# Patient Record
Sex: FEMALE | Race: BLACK OR AFRICAN AMERICAN | Employment: OTHER | URBAN - METROPOLITAN AREA
[De-identification: names, ages, dates, MRNs, and addresses within clinical notes are randomized per-mention and may not be internally consistent; named-entity substitution may affect disease eponyms.]

---

## 2017-02-07 RX ORDER — AMLODIPINE BESYLATE 5 MG/1
TABLET ORAL
Qty: 90 TABLET | Refills: 5 | Status: ON HOLD | OUTPATIENT
Start: 2017-02-07 | End: 2017-06-11 | Stop reason: HOSPADM

## 2017-05-09 ENCOUNTER — OFFICE VISIT (OUTPATIENT)
Dept: FAMILY MEDICINE CLINIC | Age: 77
End: 2017-05-09
Payer: COMMERCIAL

## 2017-05-09 VITALS — TEMPERATURE: 97.7 F | HEART RATE: 93 BPM | SYSTOLIC BLOOD PRESSURE: 95 MMHG | DIASTOLIC BLOOD PRESSURE: 62 MMHG

## 2017-05-09 DIAGNOSIS — I10 ESSENTIAL HYPERTENSION: Primary | ICD-10-CM

## 2017-05-09 DIAGNOSIS — R06.7 SNEEZING WITH WATERY EYES: ICD-10-CM

## 2017-05-09 DIAGNOSIS — H02.402 PTOSIS, LEFT: ICD-10-CM

## 2017-05-09 DIAGNOSIS — M17.0 PRIMARY OSTEOARTHRITIS OF BOTH KNEES: ICD-10-CM

## 2017-05-09 DIAGNOSIS — H04.209 SNEEZING WITH WATERY EYES: ICD-10-CM

## 2017-05-09 PROCEDURE — 99213 OFFICE O/P EST LOW 20 MIN: CPT | Performed by: FAMILY MEDICINE

## 2017-05-09 RX ORDER — LISINOPRIL 20 MG/1
TABLET ORAL
Qty: 30 TABLET | Refills: 1 | Status: ON HOLD | OUTPATIENT
Start: 2017-05-09 | End: 2017-06-11 | Stop reason: HOSPADM

## 2017-05-09 ASSESSMENT — ENCOUNTER SYMPTOMS
SHORTNESS OF BREATH: 0
ABDOMINAL DISTENTION: 0
PHOTOPHOBIA: 0
EYE REDNESS: 0
EYE PAIN: 0
DIARRHEA: 0
ABDOMINAL PAIN: 0
EYE DISCHARGE: 1
CONSTIPATION: 0
EYE ITCHING: 0
BLOOD IN STOOL: 0

## 2017-05-09 ASSESSMENT — PATIENT HEALTH QUESTIONNAIRE - PHQ9
SUM OF ALL RESPONSES TO PHQ QUESTIONS 1-9: 0
SUM OF ALL RESPONSES TO PHQ9 QUESTIONS 1 & 2: 0
2. FEELING DOWN, DEPRESSED OR HOPELESS: 0
1. LITTLE INTEREST OR PLEASURE IN DOING THINGS: 0

## 2017-05-18 ENCOUNTER — APPOINTMENT (OUTPATIENT)
Dept: GENERAL RADIOLOGY | Age: 77
End: 2017-05-18
Payer: COMMERCIAL

## 2017-05-18 ENCOUNTER — APPOINTMENT (OUTPATIENT)
Dept: CT IMAGING | Age: 77
End: 2017-05-18
Payer: COMMERCIAL

## 2017-05-18 ENCOUNTER — HOSPITAL ENCOUNTER (EMERGENCY)
Age: 77
Discharge: HOME | End: 2017-05-18
Attending: EMERGENCY MEDICINE
Payer: COMMERCIAL

## 2017-05-18 VITALS
DIASTOLIC BLOOD PRESSURE: 65 MMHG | RESPIRATION RATE: 22 BRPM | SYSTOLIC BLOOD PRESSURE: 108 MMHG | TEMPERATURE: 98.2 F | HEIGHT: 65 IN | HEART RATE: 98 BPM | OXYGEN SATURATION: 97 %

## 2017-05-18 DIAGNOSIS — N30.00 ACUTE CYSTITIS WITHOUT HEMATURIA: ICD-10-CM

## 2017-05-18 DIAGNOSIS — E87.1 HYPONATREMIA: ICD-10-CM

## 2017-05-18 DIAGNOSIS — R29.898 UPPER EXTREMITY WEAKNESS: Primary | ICD-10-CM

## 2017-05-18 LAB
-: ABNORMAL
ABSOLUTE EOS #: 0 K/UL (ref 0–0.4)
ABSOLUTE LYMPH #: 1.85 K/UL (ref 1–4.8)
ABSOLUTE MONO #: 0.09 K/UL (ref 0.1–0.8)
ALBUMIN SERPL-MCNC: 3.8 G/DL (ref 3.5–5.2)
ALBUMIN/GLOBULIN RATIO: 1.1 (ref 1–2.5)
ALP BLD-CCNC: 67 U/L (ref 35–104)
ALT SERPL-CCNC: 7 U/L (ref 5–33)
AMORPHOUS: ABNORMAL
ANION GAP SERPL CALCULATED.3IONS-SCNC: 10 MMOL/L (ref 9–17)
AST SERPL-CCNC: 17 U/L
BACTERIA: ABNORMAL
BASOPHILS # BLD: 0 %
BASOPHILS ABSOLUTE: 0 K/UL (ref 0–0.2)
BILIRUB SERPL-MCNC: 0.23 MG/DL (ref 0.3–1.2)
BILIRUBIN URINE: NEGATIVE
BUN BLDV-MCNC: 10 MG/DL (ref 8–23)
BUN/CREAT BLD: ABNORMAL (ref 9–20)
CALCIUM SERPL-MCNC: 9.5 MG/DL (ref 8.6–10.4)
CASTS UA: ABNORMAL /LPF (ref 0–2)
CHLORIDE BLD-SCNC: 94 MMOL/L (ref 98–107)
CO2: 25 MMOL/L (ref 20–31)
COLOR: YELLOW
COMMENT UA: ABNORMAL
CREAT SERPL-MCNC: 0.36 MG/DL (ref 0.5–0.9)
CRYSTALS, UA: ABNORMAL /HPF
DIFFERENTIAL TYPE: ABNORMAL
EOSINOPHILS RELATIVE PERCENT: 0 %
EPITHELIAL CELLS UA: ABNORMAL /HPF (ref 0–5)
GFR AFRICAN AMERICAN: >60 ML/MIN
GFR NON-AFRICAN AMERICAN: >60 ML/MIN
GFR SERPL CREATININE-BSD FRML MDRD: ABNORMAL ML/MIN/{1.73_M2}
GFR SERPL CREATININE-BSD FRML MDRD: ABNORMAL ML/MIN/{1.73_M2}
GLUCOSE BLD-MCNC: 122 MG/DL (ref 70–99)
GLUCOSE URINE: NEGATIVE
HCT VFR BLD CALC: 33 % (ref 36–46)
HEMOGLOBIN: 10.2 G/DL (ref 12–16)
KETONES, URINE: NEGATIVE
LEUKOCYTE ESTERASE, URINE: ABNORMAL
LYMPHOCYTES # BLD: 43 %
MCH RBC QN AUTO: 22.3 PG (ref 26–34)
MCHC RBC AUTO-ENTMCNC: 30.9 G/DL (ref 31–37)
MCV RBC AUTO: 72.3 FL (ref 80–100)
MONOCYTES # BLD: 2 %
MORPHOLOGY: ABNORMAL
MUCUS: ABNORMAL
NITRITE, URINE: NEGATIVE
OTHER OBSERVATIONS UA: ABNORMAL
PDW BLD-RTO: 24.8 % (ref 12.5–15.4)
PH UA: 8 (ref 5–8)
PLATELET # BLD: 230 K/UL (ref 140–450)
PLATELET ESTIMATE: ABNORMAL
PMV BLD AUTO: 10.1 FL (ref 6–12)
POC TROPONIN I: 0 NG/ML (ref 0–0.1)
POC TROPONIN I: 0 NG/ML (ref 0–0.1)
POC TROPONIN INTERP: NORMAL
POC TROPONIN INTERP: NORMAL
POTASSIUM SERPL-SCNC: 4.2 MMOL/L (ref 3.7–5.3)
PROTEIN UA: NEGATIVE
RBC # BLD: 4.57 M/UL (ref 4–5.2)
RBC # BLD: ABNORMAL 10*6/UL
RBC UA: ABNORMAL /HPF (ref 0–2)
RENAL EPITHELIAL, UA: ABNORMAL /HPF
SEG NEUTROPHILS: 55 %
SEGMENTED NEUTROPHILS ABSOLUTE COUNT: 2.36 K/UL (ref 1.8–7.7)
SODIUM BLD-SCNC: 129 MMOL/L (ref 135–144)
SPECIFIC GRAVITY UA: 1 (ref 1–1.03)
TOTAL PROTEIN: 7.4 G/DL (ref 6.4–8.3)
TRICHOMONAS: ABNORMAL
TURBIDITY: ABNORMAL
URINE HGB: ABNORMAL
UROBILINOGEN, URINE: NORMAL
WBC # BLD: 4.3 K/UL (ref 3.5–11)
WBC # BLD: ABNORMAL 10*3/UL
WBC UA: ABNORMAL /HPF (ref 0–5)
YEAST: ABNORMAL

## 2017-05-18 PROCEDURE — 2580000003 HC RX 258: Performed by: EMERGENCY MEDICINE

## 2017-05-18 PROCEDURE — 71010 XR CHEST PORTABLE: CPT

## 2017-05-18 PROCEDURE — 87086 URINE CULTURE/COLONY COUNT: CPT

## 2017-05-18 PROCEDURE — 84484 ASSAY OF TROPONIN QUANT: CPT

## 2017-05-18 PROCEDURE — 6370000000 HC RX 637 (ALT 250 FOR IP): Performed by: EMERGENCY MEDICINE

## 2017-05-18 PROCEDURE — 70450 CT HEAD/BRAIN W/O DYE: CPT

## 2017-05-18 PROCEDURE — 81001 URINALYSIS AUTO W/SCOPE: CPT

## 2017-05-18 PROCEDURE — 85025 COMPLETE CBC W/AUTO DIFF WBC: CPT

## 2017-05-18 PROCEDURE — 80053 COMPREHEN METABOLIC PANEL: CPT

## 2017-05-18 PROCEDURE — 93005 ELECTROCARDIOGRAM TRACING: CPT

## 2017-05-18 PROCEDURE — 99285 EMERGENCY DEPT VISIT HI MDM: CPT

## 2017-05-18 PROCEDURE — 72125 CT NECK SPINE W/O DYE: CPT

## 2017-05-18 RX ORDER — CEPHALEXIN 500 MG/1
500 CAPSULE ORAL ONCE
Status: COMPLETED | OUTPATIENT
Start: 2017-05-18 | End: 2017-05-18

## 2017-05-18 RX ORDER — CEPHALEXIN 500 MG/1
500 CAPSULE ORAL 2 TIMES DAILY
Qty: 14 CAPSULE | Refills: 0 | Status: SHIPPED | OUTPATIENT
Start: 2017-05-18 | End: 2017-05-25

## 2017-05-18 RX ORDER — 0.9 % SODIUM CHLORIDE 0.9 %
500 INTRAVENOUS SOLUTION INTRAVENOUS ONCE
Status: COMPLETED | OUTPATIENT
Start: 2017-05-18 | End: 2017-05-18

## 2017-05-18 RX ADMIN — SODIUM CHLORIDE 500 ML: 9 INJECTION, SOLUTION INTRAVENOUS at 18:56

## 2017-05-18 RX ADMIN — CEPHALEXIN 500 MG: 500 CAPSULE ORAL at 20:55

## 2017-05-19 LAB
CULTURE: NORMAL
CULTURE: NORMAL
Lab: NORMAL
SPECIMEN DESCRIPTION: NORMAL
STATUS: NORMAL

## 2017-05-22 LAB
EKG ATRIAL RATE: 92 BPM
EKG P AXIS: 59 DEGREES
EKG P-R INTERVAL: 154 MS
EKG Q-T INTERVAL: 322 MS
EKG QRS DURATION: 74 MS
EKG QTC CALCULATION (BAZETT): 398 MS
EKG R AXIS: 2 DEGREES
EKG T AXIS: 23 DEGREES
EKG VENTRICULAR RATE: 92 BPM

## 2017-06-01 ENCOUNTER — APPOINTMENT (OUTPATIENT)
Dept: MRI IMAGING | Age: 77
DRG: 057 | End: 2017-06-01
Payer: COMMERCIAL

## 2017-06-01 ENCOUNTER — HOSPITAL ENCOUNTER (INPATIENT)
Age: 77
LOS: 10 days | Discharge: HOME | DRG: 057 | End: 2017-06-11
Attending: EMERGENCY MEDICINE | Admitting: FAMILY MEDICINE
Payer: COMMERCIAL

## 2017-06-01 ENCOUNTER — APPOINTMENT (OUTPATIENT)
Dept: GENERAL RADIOLOGY | Age: 77
DRG: 057 | End: 2017-06-01
Payer: COMMERCIAL

## 2017-06-01 DIAGNOSIS — R13.19 OTHER DYSPHAGIA: ICD-10-CM

## 2017-06-01 DIAGNOSIS — H02.403 PTOSIS, BILATERAL: Primary | ICD-10-CM

## 2017-06-01 DIAGNOSIS — H57.03: ICD-10-CM

## 2017-06-01 DIAGNOSIS — E16.2 HYPOGLYCEMIA: ICD-10-CM

## 2017-06-01 PROBLEM — Z74.09 IMMOBILITY: Status: ACTIVE | Noted: 2017-06-01

## 2017-06-01 PROBLEM — R13.10 DYSPHAGIA: Status: ACTIVE | Noted: 2017-06-01

## 2017-06-01 PROBLEM — R47.1 DYSARTHRIA: Status: ACTIVE | Noted: 2017-06-01

## 2017-06-01 PROBLEM — J44.9 COPD (CHRONIC OBSTRUCTIVE PULMONARY DISEASE) (HCC): Status: ACTIVE | Noted: 2017-06-01

## 2017-06-01 PROBLEM — H02.409 PTOSIS: Status: ACTIVE | Noted: 2017-06-01

## 2017-06-01 LAB
ABSOLUTE EOS #: 0.05 K/UL (ref 0–0.4)
ABSOLUTE LYMPH #: 2.11 K/UL (ref 1–4.8)
ABSOLUTE MONO #: 0.34 K/UL (ref 0.1–0.8)
ANION GAP SERPL CALCULATED.3IONS-SCNC: 16 MMOL/L (ref 9–17)
BASOPHILS # BLD: 0 %
BASOPHILS ABSOLUTE: 0 K/UL (ref 0–0.2)
BUN BLDV-MCNC: 13 MG/DL (ref 8–23)
BUN/CREAT BLD: ABNORMAL (ref 9–20)
CALCIUM SERPL-MCNC: 9.1 MG/DL (ref 8.6–10.4)
CHLORIDE BLD-SCNC: 98 MMOL/L (ref 98–107)
CHP ED QC CHECK: YES
CO2: 20 MMOL/L (ref 20–31)
CREAT SERPL-MCNC: 0.41 MG/DL (ref 0.5–0.9)
DIFFERENTIAL TYPE: ABNORMAL
EOSINOPHILS RELATIVE PERCENT: 1 %
GFR AFRICAN AMERICAN: >60 ML/MIN
GFR NON-AFRICAN AMERICAN: >60 ML/MIN
GFR SERPL CREATININE-BSD FRML MDRD: ABNORMAL ML/MIN/{1.73_M2}
GFR SERPL CREATININE-BSD FRML MDRD: ABNORMAL ML/MIN/{1.73_M2}
GLUCOSE BLD-MCNC: 106 MG/DL (ref 70–99)
GLUCOSE BLD-MCNC: 53 MG/DL
GLUCOSE BLD-MCNC: 53 MG/DL (ref 65–105)
HCT VFR BLD CALC: 36.1 % (ref 36–46)
HEMOGLOBIN: 11.2 G/DL (ref 12–16)
LYMPHOCYTES # BLD: 44 %
MCH RBC QN AUTO: 22.8 PG (ref 26–34)
MCHC RBC AUTO-ENTMCNC: 31.1 G/DL (ref 31–37)
MCV RBC AUTO: 73.2 FL (ref 80–100)
MONOCYTES # BLD: 7 %
MORPHOLOGY: ABNORMAL
PDW BLD-RTO: 23.7 % (ref 12.5–15.4)
PLATELET # BLD: 304 K/UL (ref 140–450)
PLATELET ESTIMATE: ABNORMAL
PMV BLD AUTO: 11.1 FL (ref 6–12)
POC LACTIC ACID, VEN: 1.37 MMOL/L (ref 0.9–1.7)
POTASSIUM SERPL-SCNC: 4.9 MMOL/L (ref 3.7–5.3)
RBC # BLD: 4.93 M/UL (ref 4–5.2)
RBC # BLD: ABNORMAL 10*6/UL
SEG NEUTROPHILS: 48 %
SEGMENTED NEUTROPHILS ABSOLUTE COUNT: 2.3 K/UL (ref 1.8–7.7)
SODIUM BLD-SCNC: 134 MMOL/L (ref 135–144)
WBC # BLD: 4.8 K/UL (ref 3.5–11)
WBC # BLD: ABNORMAL 10*3/UL

## 2017-06-01 PROCEDURE — 83874 ASSAY OF MYOGLOBIN: CPT

## 2017-06-01 PROCEDURE — 6360000002 HC RX W HCPCS: Performed by: FAMILY MEDICINE

## 2017-06-01 PROCEDURE — 86618 LYME DISEASE ANTIBODY: CPT

## 2017-06-01 PROCEDURE — 70544 MR ANGIOGRAPHY HEAD W/O DYE: CPT

## 2017-06-01 PROCEDURE — 84100 ASSAY OF PHOSPHORUS: CPT

## 2017-06-01 PROCEDURE — 82550 ASSAY OF CK (CPK): CPT

## 2017-06-01 PROCEDURE — 82947 ASSAY GLUCOSE BLOOD QUANT: CPT

## 2017-06-01 PROCEDURE — 2060000000 HC ICU INTERMEDIATE R&B

## 2017-06-01 PROCEDURE — 6A551Z3 PHERESIS OF PLASMA, MULTIPLE: ICD-10-PCS | Performed by: FAMILY MEDICINE

## 2017-06-01 PROCEDURE — 82728 ASSAY OF FERRITIN: CPT

## 2017-06-01 PROCEDURE — 83605 ASSAY OF LACTIC ACID: CPT

## 2017-06-01 PROCEDURE — 99285 EMERGENCY DEPT VISIT HI MDM: CPT

## 2017-06-01 PROCEDURE — 2580000003 HC RX 258: Performed by: FAMILY MEDICINE

## 2017-06-01 PROCEDURE — 71010 XR CHEST PORTABLE: CPT

## 2017-06-01 PROCEDURE — 83550 IRON BINDING TEST: CPT

## 2017-06-01 PROCEDURE — 80048 BASIC METABOLIC PNL TOTAL CA: CPT

## 2017-06-01 PROCEDURE — 70551 MRI BRAIN STEM W/O DYE: CPT

## 2017-06-01 PROCEDURE — 85025 COMPLETE CBC W/AUTO DIFF WBC: CPT

## 2017-06-01 PROCEDURE — 87086 URINE CULTURE/COLONY COUNT: CPT

## 2017-06-01 PROCEDURE — 86038 ANTINUCLEAR ANTIBODIES: CPT

## 2017-06-01 PROCEDURE — 83540 ASSAY OF IRON: CPT

## 2017-06-01 PROCEDURE — 2580000003 HC RX 258: Performed by: STUDENT IN AN ORGANIZED HEALTH CARE EDUCATION/TRAINING PROGRAM

## 2017-06-01 PROCEDURE — 70360 X-RAY EXAM OF NECK: CPT

## 2017-06-01 RX ORDER — TRAMADOL HYDROCHLORIDE 50 MG/1
50 TABLET ORAL EVERY 6 HOURS PRN
Status: DISCONTINUED | OUTPATIENT
Start: 2017-06-01 | End: 2017-06-11 | Stop reason: HOSPADM

## 2017-06-01 RX ORDER — MORPHINE SULFATE 2 MG/ML
2 INJECTION, SOLUTION INTRAMUSCULAR; INTRAVENOUS
Status: DISCONTINUED | OUTPATIENT
Start: 2017-06-01 | End: 2017-06-11 | Stop reason: HOSPADM

## 2017-06-01 RX ORDER — ONDANSETRON 2 MG/ML
4 INJECTION INTRAMUSCULAR; INTRAVENOUS EVERY 6 HOURS PRN
Status: DISCONTINUED | OUTPATIENT
Start: 2017-06-01 | End: 2017-06-11 | Stop reason: HOSPADM

## 2017-06-01 RX ORDER — SODIUM CHLORIDE 0.9 % (FLUSH) 0.9 %
10 SYRINGE (ML) INJECTION PRN
Status: DISCONTINUED | OUTPATIENT
Start: 2017-06-01 | End: 2017-06-11 | Stop reason: HOSPADM

## 2017-06-01 RX ORDER — SODIUM CHLORIDE 0.9 % (FLUSH) 0.9 %
10 SYRINGE (ML) INJECTION EVERY 12 HOURS SCHEDULED
Status: DISCONTINUED | OUTPATIENT
Start: 2017-06-01 | End: 2017-06-07 | Stop reason: SDUPTHER

## 2017-06-01 RX ORDER — HEPARIN SODIUM 5000 [USP'U]/ML
5000 INJECTION, SOLUTION INTRAVENOUS; SUBCUTANEOUS EVERY 8 HOURS SCHEDULED
Status: DISCONTINUED | OUTPATIENT
Start: 2017-06-01 | End: 2017-06-11 | Stop reason: HOSPADM

## 2017-06-01 RX ORDER — DEXTROSE MONOHYDRATE 25 G/50ML
25 INJECTION, SOLUTION INTRAVENOUS ONCE
Status: COMPLETED | OUTPATIENT
Start: 2017-06-01 | End: 2017-06-01

## 2017-06-01 RX ORDER — 0.9 % SODIUM CHLORIDE 0.9 %
1000 INTRAVENOUS SOLUTION INTRAVENOUS ONCE
Status: COMPLETED | OUTPATIENT
Start: 2017-06-01 | End: 2017-06-01

## 2017-06-01 RX ORDER — POTASSIUM CHLORIDE 7.45 MG/ML
10 INJECTION INTRAVENOUS PRN
Status: DISCONTINUED | OUTPATIENT
Start: 2017-06-01 | End: 2017-06-11 | Stop reason: HOSPADM

## 2017-06-01 RX ORDER — SODIUM CHLORIDE 0.9 % (FLUSH) 0.9 %
10 SYRINGE (ML) INJECTION PRN
Status: DISCONTINUED | OUTPATIENT
Start: 2017-06-01 | End: 2017-06-07 | Stop reason: SDUPTHER

## 2017-06-01 RX ORDER — DEXTROSE MONOHYDRATE 25 G/50ML
12.5 INJECTION, SOLUTION INTRAVENOUS ONCE
Status: DISCONTINUED | OUTPATIENT
Start: 2017-06-01 | End: 2017-06-01

## 2017-06-01 RX ORDER — SODIUM CHLORIDE 9 MG/ML
INJECTION, SOLUTION INTRAVENOUS CONTINUOUS
Status: DISCONTINUED | OUTPATIENT
Start: 2017-06-01 | End: 2017-06-01

## 2017-06-01 RX ORDER — POTASSIUM CHLORIDE 20MEQ/15ML
40 LIQUID (ML) ORAL PRN
Status: DISCONTINUED | OUTPATIENT
Start: 2017-06-01 | End: 2017-06-11 | Stop reason: HOSPADM

## 2017-06-01 RX ORDER — ACETAMINOPHEN 325 MG/1
650 TABLET ORAL EVERY 4 HOURS PRN
Status: DISCONTINUED | OUTPATIENT
Start: 2017-06-01 | End: 2017-06-11 | Stop reason: HOSPADM

## 2017-06-01 RX ORDER — ACETAMINOPHEN 325 MG/1
650 TABLET ORAL EVERY 4 HOURS PRN
Status: DISCONTINUED | OUTPATIENT
Start: 2017-06-01 | End: 2017-06-01 | Stop reason: SDUPTHER

## 2017-06-01 RX ORDER — SODIUM CHLORIDE 0.9 % (FLUSH) 0.9 %
10 SYRINGE (ML) INJECTION EVERY 12 HOURS SCHEDULED
Status: DISCONTINUED | OUTPATIENT
Start: 2017-06-01 | End: 2017-06-11 | Stop reason: HOSPADM

## 2017-06-01 RX ORDER — POTASSIUM CHLORIDE 20 MEQ/1
40 TABLET, EXTENDED RELEASE ORAL PRN
Status: DISCONTINUED | OUTPATIENT
Start: 2017-06-01 | End: 2017-06-11 | Stop reason: HOSPADM

## 2017-06-01 RX ADMIN — HEPARIN SODIUM 5000 UNITS: 5000 INJECTION, SOLUTION INTRAVENOUS; SUBCUTANEOUS at 23:03

## 2017-06-01 RX ADMIN — DEXTROSE AND SODIUM CHLORIDE: 5; 450 INJECTION, SOLUTION INTRAVENOUS at 23:50

## 2017-06-01 RX ADMIN — Medication 10 ML: at 23:05

## 2017-06-01 RX ADMIN — SODIUM CHLORIDE 1000 ML: 9 INJECTION, SOLUTION INTRAVENOUS at 18:15

## 2017-06-01 RX ADMIN — DEXTROSE MONOHYDRATE 25 G: 25 INJECTION, SOLUTION INTRAVENOUS at 18:41

## 2017-06-01 ASSESSMENT — ENCOUNTER SYMPTOMS
VOMITING: 0
SHORTNESS OF BREATH: 0
BACK PAIN: 0
PHOTOPHOBIA: 0
BLOOD IN STOOL: 0
ABDOMINAL PAIN: 0
WHEEZING: 0
RHINORRHEA: 0
NAUSEA: 0
COUGH: 0
SORE THROAT: 0
TROUBLE SWALLOWING: 1
ABDOMINAL DISTENTION: 0

## 2017-06-02 ENCOUNTER — APPOINTMENT (OUTPATIENT)
Dept: INTERVENTIONAL RADIOLOGY/VASCULAR | Age: 77
DRG: 057 | End: 2017-06-02
Payer: COMMERCIAL

## 2017-06-02 ENCOUNTER — APPOINTMENT (OUTPATIENT)
Dept: CT IMAGING | Age: 77
DRG: 057 | End: 2017-06-02
Payer: COMMERCIAL

## 2017-06-02 LAB
ABSOLUTE EOS #: 0.04 K/UL (ref 0–0.4)
ABSOLUTE LYMPH #: 1.43 K/UL (ref 1–4.8)
ABSOLUTE MONO #: 0.29 K/UL (ref 0.1–0.8)
ALBUMIN SERPL-MCNC: 3.9 G/DL (ref 3.5–5.2)
ALBUMIN/GLOBULIN RATIO: 1.1 (ref 1–2.5)
ALP BLD-CCNC: 71 U/L (ref 35–104)
ALT SERPL-CCNC: 8 U/L (ref 5–33)
ANION GAP SERPL CALCULATED.3IONS-SCNC: 15 MMOL/L (ref 9–17)
AST SERPL-CCNC: 12 U/L
BASOPHILS # BLD: 0 %
BASOPHILS ABSOLUTE: 0 K/UL (ref 0–0.2)
BILIRUB SERPL-MCNC: 0.3 MG/DL (ref 0.3–1.2)
BILIRUBIN DIRECT: <0.08 MG/DL
BILIRUBIN, INDIRECT: NORMAL MG/DL (ref 0–1)
BUN BLDV-MCNC: 10 MG/DL (ref 8–23)
BUN/CREAT BLD: ABNORMAL (ref 9–20)
CALCIUM SERPL-MCNC: 9.2 MG/DL (ref 8.6–10.4)
CHLORIDE BLD-SCNC: 99 MMOL/L (ref 98–107)
CO2: 24 MMOL/L (ref 20–31)
CORTISOL COLLECTION INFO: NORMAL
CORTISOL: 8.5 UG/DL
CREAT SERPL-MCNC: 0.36 MG/DL (ref 0.5–0.9)
DIFFERENTIAL TYPE: ABNORMAL
EOSINOPHILS RELATIVE PERCENT: 1 %
FERRITIN: 19 UG/L (ref 13–150)
FIO2: 2
GFR AFRICAN AMERICAN: >60 ML/MIN
GFR NON-AFRICAN AMERICAN: >60 ML/MIN
GFR SERPL CREATININE-BSD FRML MDRD: ABNORMAL ML/MIN/{1.73_M2}
GFR SERPL CREATININE-BSD FRML MDRD: ABNORMAL ML/MIN/{1.73_M2}
GLOBULIN: NORMAL G/DL (ref 1.5–3.8)
GLUCOSE BLD-MCNC: 105 MG/DL (ref 70–99)
HCT VFR BLD CALC: 34.1 % (ref 36–46)
HEMOGLOBIN: 10.7 G/DL (ref 12–16)
IRON SATURATION: 11 % (ref 20–55)
IRON: 79 UG/DL (ref 37–145)
LYMPHOCYTES # BLD: 34 %
MCH RBC QN AUTO: 22.8 PG (ref 26–34)
MCHC RBC AUTO-ENTMCNC: 31.4 G/DL (ref 31–37)
MCV RBC AUTO: 72.7 FL (ref 80–100)
MONOCYTES # BLD: 7 %
MORPHOLOGY: ABNORMAL
MYOGLOBIN: 21 NG/ML (ref 25–58)
NEGATIVE BASE EXCESS, ART: ABNORMAL (ref 0–2)
O2 DEVICE/FLOW/%: ABNORMAL
PATIENT TEMP: ABNORMAL
PDW BLD-RTO: 23.5 % (ref 12.5–15.4)
PHOSPHORUS: 5.2 MG/DL (ref 2.6–4.5)
PLATELET # BLD: 253 K/UL (ref 140–450)
PLATELET ESTIMATE: ABNORMAL
PMV BLD AUTO: 10.3 FL (ref 6–12)
POC HCO3: 27.7 MMOL/L (ref 22–27)
POC O2 SATURATION: 99 %
POC PCO2 TEMP: ABNORMAL MM HG
POC PCO2: 53 MM HG (ref 32–45)
POC PH TEMP: ABNORMAL
POC PH: 7.33 (ref 7.35–7.45)
POC PO2 TEMP: ABNORMAL MM HG
POC PO2: 131 MM HG (ref 75–95)
POSITIVE BASE EXCESS, ART: 2 (ref 0–2)
POTASSIUM SERPL-SCNC: 3.7 MMOL/L (ref 3.7–5.3)
PREALBUMIN: 15.6 MG/DL (ref 20–40)
RBC # BLD: 4.69 M/UL (ref 4–5.2)
RBC # BLD: ABNORMAL 10*6/UL
SEG NEUTROPHILS: 58 %
SEGMENTED NEUTROPHILS ABSOLUTE COUNT: 2.44 K/UL (ref 1.8–7.7)
SODIUM BLD-SCNC: 138 MMOL/L (ref 135–144)
TCO2 (CALC), ART: 29 MMOL/L (ref 23–28)
TOTAL CK: 126 U/L (ref 26–192)
TOTAL IRON BINDING CAPACITY: 705 UG/DL (ref 250–450)
TOTAL PROTEIN: 7.5 G/DL (ref 6.4–8.3)
TSH SERPL DL<=0.05 MIU/L-ACNC: 1.49 MIU/L (ref 0.3–5)
UNSATURATED IRON BINDING CAPACITY: 626 UG/DL (ref 112–347)
WBC # BLD: 4.2 K/UL (ref 3.5–11)
WBC # BLD: ABNORMAL 10*3/UL

## 2017-06-02 PROCEDURE — 6360000002 HC RX W HCPCS: Performed by: HOSPITALIST

## 2017-06-02 PROCEDURE — 99222 1ST HOSP IP/OBS MODERATE 55: CPT | Performed by: PSYCHIATRY & NEUROLOGY

## 2017-06-02 PROCEDURE — 36415 COLL VENOUS BLD VENIPUNCTURE: CPT

## 2017-06-02 PROCEDURE — G8996 SWALLOW CURRENT STATUS: HCPCS

## 2017-06-02 PROCEDURE — 83519 RIA NONANTIBODY: CPT

## 2017-06-02 PROCEDURE — 99223 1ST HOSP IP/OBS HIGH 75: CPT | Performed by: FAMILY MEDICINE

## 2017-06-02 PROCEDURE — 80076 HEPATIC FUNCTION PANEL: CPT

## 2017-06-02 PROCEDURE — 2000000003 HC NEURO ICU R&B

## 2017-06-02 PROCEDURE — 2580000003 HC RX 258: Performed by: FAMILY MEDICINE

## 2017-06-02 PROCEDURE — G8997 SWALLOW GOAL STATUS: HCPCS

## 2017-06-02 PROCEDURE — 80048 BASIC METABOLIC PNL TOTAL CA: CPT

## 2017-06-02 PROCEDURE — G8999 MOTOR SPEECH CURRENT STATUS: HCPCS

## 2017-06-02 PROCEDURE — 6360000002 HC RX W HCPCS: Performed by: FAMILY MEDICINE

## 2017-06-02 PROCEDURE — 76937 US GUIDE VASCULAR ACCESS: CPT

## 2017-06-02 PROCEDURE — 99222 1ST HOSP IP/OBS MODERATE 55: CPT | Performed by: INTERNAL MEDICINE

## 2017-06-02 PROCEDURE — 86255 FLUORESCENT ANTIBODY SCREEN: CPT

## 2017-06-02 PROCEDURE — 6360000004 HC RX CONTRAST MEDICATION: Performed by: FAMILY MEDICINE

## 2017-06-02 PROCEDURE — 92610 EVALUATE SWALLOWING FUNCTION: CPT

## 2017-06-02 PROCEDURE — 6370000000 HC RX 637 (ALT 250 FOR IP): Performed by: PSYCHIATRY & NEUROLOGY

## 2017-06-02 PROCEDURE — 92523 SPEECH SOUND LANG COMPREHEN: CPT

## 2017-06-02 PROCEDURE — 84443 ASSAY THYROID STIM HORMONE: CPT

## 2017-06-02 PROCEDURE — 94150 VITAL CAPACITY TEST: CPT

## 2017-06-02 PROCEDURE — 82803 BLOOD GASES ANY COMBINATION: CPT

## 2017-06-02 PROCEDURE — 83516 IMMUNOASSAY NONANTIBODY: CPT

## 2017-06-02 PROCEDURE — 36600 WITHDRAWAL OF ARTERIAL BLOOD: CPT

## 2017-06-02 PROCEDURE — 2580000003 HC RX 258: Performed by: HOSPITALIST

## 2017-06-02 PROCEDURE — 82533 TOTAL CORTISOL: CPT

## 2017-06-02 PROCEDURE — 85025 COMPLETE CBC W/AUTO DIFF WBC: CPT

## 2017-06-02 PROCEDURE — G9158 MOTOR SPEECH D/C STATUS: HCPCS

## 2017-06-02 PROCEDURE — G8998 SWALLOW D/C STATUS: HCPCS

## 2017-06-02 PROCEDURE — 84134 ASSAY OF PREALBUMIN: CPT

## 2017-06-02 PROCEDURE — 71250 CT THORAX DX C-: CPT

## 2017-06-02 PROCEDURE — 43752 NASAL/OROGASTRIC W/TUBE PLMT: CPT | Performed by: RADIOLOGY

## 2017-06-02 PROCEDURE — G9186 MOTOR SPEECH GOAL STATUS: HCPCS

## 2017-06-02 RX ORDER — PYRIDOSTIGMINE BROMIDE 60 MG/1
60 TABLET ORAL EVERY 8 HOURS SCHEDULED
Status: DISCONTINUED | OUTPATIENT
Start: 2017-06-02 | End: 2017-06-11 | Stop reason: HOSPADM

## 2017-06-02 RX ORDER — DEXTROSE AND SODIUM CHLORIDE 5; .45 G/100ML; G/100ML
INJECTION, SOLUTION INTRAVENOUS CONTINUOUS
Status: DISCONTINUED | OUTPATIENT
Start: 2017-06-02 | End: 2017-06-03

## 2017-06-02 RX ORDER — PYRIDOSTIGMINE BROMIDE 60 MG/1
60 TABLET ORAL ONCE
Status: COMPLETED | OUTPATIENT
Start: 2017-06-02 | End: 2017-06-02

## 2017-06-02 RX ADMIN — HEPARIN SODIUM 5000 UNITS: 5000 INJECTION, SOLUTION INTRAVENOUS; SUBCUTANEOUS at 05:48

## 2017-06-02 RX ADMIN — IRON SUCROSE 200 MG: 20 INJECTION, SOLUTION INTRAVENOUS at 15:08

## 2017-06-02 RX ADMIN — PYRIDOSTIGMINE BROMIDE 60 MG: 60 TABLET ORAL at 15:06

## 2017-06-02 RX ADMIN — DEXTROSE AND SODIUM CHLORIDE: 5; 450 INJECTION, SOLUTION INTRAVENOUS at 21:41

## 2017-06-02 RX ADMIN — SODIUM CHLORIDE: 9 INJECTION, SOLUTION INTRAVENOUS at 21:51

## 2017-06-02 RX ADMIN — HEPARIN SODIUM 5000 UNITS: 5000 INJECTION, SOLUTION INTRAVENOUS; SUBCUTANEOUS at 21:29

## 2017-06-02 RX ADMIN — DEXTROSE AND SODIUM CHLORIDE: 5; 450 INJECTION, SOLUTION INTRAVENOUS at 10:55

## 2017-06-02 RX ADMIN — SODIUM CHLORIDE, PRESERVATIVE FREE 10 ML: 5 INJECTION INTRAVENOUS at 21:32

## 2017-06-02 RX ADMIN — IOTHALAMATE MEGLUMINE 18 ML: 430 INJECTION INTRAVASCULAR at 14:36

## 2017-06-02 RX ADMIN — PYRIDOSTIGMINE BROMIDE 60 MG: 60 TABLET ORAL at 23:20

## 2017-06-03 ENCOUNTER — APPOINTMENT (OUTPATIENT)
Dept: GENERAL RADIOLOGY | Age: 77
DRG: 057 | End: 2017-06-03
Payer: COMMERCIAL

## 2017-06-03 ENCOUNTER — APPOINTMENT (OUTPATIENT)
Dept: CT IMAGING | Age: 77
DRG: 057 | End: 2017-06-03
Payer: COMMERCIAL

## 2017-06-03 LAB
ABSOLUTE EOS #: 0.06 K/UL (ref 0–0.4)
ABSOLUTE LYMPH #: 0.67 K/UL (ref 1–4.8)
ABSOLUTE MONO #: 0.28 K/UL (ref 0.1–0.8)
ANION GAP SERPL CALCULATED.3IONS-SCNC: 10 MMOL/L (ref 9–17)
ANION GAP SERPL CALCULATED.3IONS-SCNC: 9 MMOL/L (ref 9–17)
BASOPHILS # BLD: 1 %
BASOPHILS ABSOLUTE: 0.06 K/UL (ref 0–0.2)
BUN BLDV-MCNC: 4 MG/DL (ref 8–23)
BUN BLDV-MCNC: 6 MG/DL (ref 8–23)
BUN/CREAT BLD: ABNORMAL (ref 9–20)
BUN/CREAT BLD: ABNORMAL (ref 9–20)
CALCIUM IONIZED: 1.31 MMOL/L (ref 1.13–1.33)
CALCIUM SERPL-MCNC: 8.7 MG/DL (ref 8.6–10.4)
CALCIUM SERPL-MCNC: 9.4 MG/DL (ref 8.6–10.4)
CHLORIDE BLD-SCNC: 101 MMOL/L (ref 98–107)
CHLORIDE BLD-SCNC: 104 MMOL/L (ref 98–107)
CO2: 23 MMOL/L (ref 20–31)
CO2: 23 MMOL/L (ref 20–31)
CREAT SERPL-MCNC: 0.26 MG/DL (ref 0.5–0.9)
CREAT SERPL-MCNC: 0.31 MG/DL (ref 0.5–0.9)
CULTURE: NORMAL
CULTURE: NORMAL
DIFFERENTIAL TYPE: ABNORMAL
EOSINOPHILS RELATIVE PERCENT: 1 %
FIBRINOGEN: 262 MG/DL (ref 140–420)
GFR AFRICAN AMERICAN: >60 ML/MIN
GFR AFRICAN AMERICAN: >60 ML/MIN
GFR NON-AFRICAN AMERICAN: >60 ML/MIN
GFR NON-AFRICAN AMERICAN: >60 ML/MIN
GFR SERPL CREATININE-BSD FRML MDRD: ABNORMAL ML/MIN/{1.73_M2}
GLUCOSE BLD-MCNC: 124 MG/DL (ref 70–99)
GLUCOSE BLD-MCNC: 220 MG/DL (ref 70–99)
HCT VFR BLD CALC: 32.3 % (ref 36–46)
HEMOGLOBIN: 10.1 G/DL (ref 12–16)
LYMPHOCYTES # BLD: 12 %
Lab: NORMAL
MCH RBC QN AUTO: 22.8 PG (ref 26–34)
MCHC RBC AUTO-ENTMCNC: 31.3 G/DL (ref 31–37)
MCV RBC AUTO: 72.7 FL (ref 80–100)
MONOCYTES # BLD: 5 %
MORPHOLOGY: ABNORMAL
PDW BLD-RTO: 22.7 % (ref 12.5–15.4)
PHOSPHORUS: 2.5 MG/DL (ref 2.6–4.5)
PLATELET # BLD: 244 K/UL (ref 140–450)
PLATELET ESTIMATE: ABNORMAL
PMV BLD AUTO: 10.3 FL (ref 6–12)
POTASSIUM SERPL-SCNC: 3.5 MMOL/L (ref 3.7–5.3)
POTASSIUM SERPL-SCNC: 4.2 MMOL/L (ref 3.7–5.3)
RBC # BLD: 4.45 M/UL (ref 4–5.2)
RBC # BLD: ABNORMAL 10*6/UL
SEG NEUTROPHILS: 81 %
SEGMENTED NEUTROPHILS ABSOLUTE COUNT: 4.53 K/UL (ref 1.8–7.7)
SODIUM BLD-SCNC: 134 MMOL/L (ref 135–144)
SODIUM BLD-SCNC: 136 MMOL/L (ref 135–144)
SPECIMEN DESCRIPTION: NORMAL
STATUS: NORMAL
VITAMIN D 25-HYDROXY: 21.9 NG/ML (ref 30–100)
WBC # BLD: 5.6 K/UL (ref 3.5–11)
WBC # BLD: ABNORMAL 10*3/UL

## 2017-06-03 PROCEDURE — 74000 XR ABDOMEN LIMITED (KUB): CPT

## 2017-06-03 PROCEDURE — G8979 MOBILITY GOAL STATUS: HCPCS

## 2017-06-03 PROCEDURE — 82306 VITAMIN D 25 HYDROXY: CPT

## 2017-06-03 PROCEDURE — 97110 THERAPEUTIC EXERCISES: CPT

## 2017-06-03 PROCEDURE — 2580000003 HC RX 258

## 2017-06-03 PROCEDURE — 36515 HC APHERESIS THER W/PLASMA REINF: CPT

## 2017-06-03 PROCEDURE — 6360000002 HC RX W HCPCS: Performed by: FAMILY MEDICINE

## 2017-06-03 PROCEDURE — 36415 COLL VENOUS BLD VENIPUNCTURE: CPT

## 2017-06-03 PROCEDURE — 82330 ASSAY OF CALCIUM: CPT

## 2017-06-03 PROCEDURE — 97530 THERAPEUTIC ACTIVITIES: CPT

## 2017-06-03 PROCEDURE — 84100 ASSAY OF PHOSPHORUS: CPT

## 2017-06-03 PROCEDURE — 71010 XR CHEST PORTABLE: CPT

## 2017-06-03 PROCEDURE — 85384 FIBRINOGEN ACTIVITY: CPT

## 2017-06-03 PROCEDURE — 2000000003 HC NEURO ICU R&B

## 2017-06-03 PROCEDURE — 85025 COMPLETE CBC W/AUTO DIFF WBC: CPT

## 2017-06-03 PROCEDURE — 97162 PT EVAL MOD COMPLEX 30 MIN: CPT

## 2017-06-03 PROCEDURE — 99232 SBSQ HOSP IP/OBS MODERATE 35: CPT

## 2017-06-03 PROCEDURE — 99232 SBSQ HOSP IP/OBS MODERATE 35: CPT | Performed by: FAMILY MEDICINE

## 2017-06-03 PROCEDURE — 6370000000 HC RX 637 (ALT 250 FOR IP): Performed by: FAMILY MEDICINE

## 2017-06-03 PROCEDURE — 2580000003 HC RX 258: Performed by: FAMILY MEDICINE

## 2017-06-03 PROCEDURE — 80048 BASIC METABOLIC PNL TOTAL CA: CPT

## 2017-06-03 PROCEDURE — P9041 ALBUMIN (HUMAN),5%, 50ML: HCPCS

## 2017-06-03 PROCEDURE — 6360000004 HC RX CONTRAST MEDICATION

## 2017-06-03 PROCEDURE — 71260 CT THORAX DX C+: CPT

## 2017-06-03 PROCEDURE — 6370000000 HC RX 637 (ALT 250 FOR IP): Performed by: PSYCHIATRY & NEUROLOGY

## 2017-06-03 PROCEDURE — 2500000003 HC RX 250 WO HCPCS

## 2017-06-03 PROCEDURE — G8978 MOBILITY CURRENT STATUS: HCPCS

## 2017-06-03 PROCEDURE — 6360000002 HC RX W HCPCS

## 2017-06-03 PROCEDURE — 3910000000 HC WEEKEND / HOLIDAY ARC SERVICE

## 2017-06-03 PROCEDURE — 99291 CRITICAL CARE FIRST HOUR: CPT | Performed by: INTERNAL MEDICINE

## 2017-06-03 RX ORDER — LIDOCAINE HYDROCHLORIDE 10 MG/ML
5 INJECTION, SOLUTION EPIDURAL; INFILTRATION; INTRACAUDAL; PERINEURAL ONCE
Status: COMPLETED | OUTPATIENT
Start: 2017-06-03 | End: 2017-06-03

## 2017-06-03 RX ORDER — ALBUMIN, HUMAN INJ 5% 5 %
5000 SOLUTION INTRAVENOUS EVERY OTHER DAY
Status: DISCONTINUED | OUTPATIENT
Start: 2017-06-03 | End: 2017-06-09

## 2017-06-03 RX ORDER — SODIUM CHLORIDE 9 MG/ML
INJECTION, SOLUTION INTRAVENOUS CONTINUOUS
Status: DISCONTINUED | OUTPATIENT
Start: 2017-06-03 | End: 2017-06-07

## 2017-06-03 RX ORDER — HEPARIN SODIUM 5000 [USP'U]/ML
15000 INJECTION, SOLUTION INTRAVENOUS; SUBCUTANEOUS EVERY OTHER DAY
Status: DISCONTINUED | OUTPATIENT
Start: 2017-06-03 | End: 2017-06-09

## 2017-06-03 RX ORDER — HEPARIN SODIUM 5000 [USP'U]/ML
15000 INJECTION, SOLUTION INTRAVENOUS; SUBCUTANEOUS DAILY
Status: DISCONTINUED | OUTPATIENT
Start: 2017-06-03 | End: 2017-06-03

## 2017-06-03 RX ORDER — LIDOCAINE HYDROCHLORIDE 10 MG/ML
INJECTION, SOLUTION INFILTRATION; PERINEURAL
Status: COMPLETED
Start: 2017-06-03 | End: 2017-06-03

## 2017-06-03 RX ORDER — ALBUMIN, HUMAN INJ 5% 5 %
5000 SOLUTION INTRAVENOUS DAILY
Status: DISCONTINUED | OUTPATIENT
Start: 2017-06-03 | End: 2017-06-03

## 2017-06-03 RX ADMIN — IOVERSOL 75 ML: 741 INJECTION INTRA-ARTERIAL; INTRAVENOUS at 23:28

## 2017-06-03 RX ADMIN — ALBUMIN (HUMAN) 5000 ML: 12.5 INJECTION, SOLUTION INTRAVENOUS at 18:41

## 2017-06-03 RX ADMIN — PYRIDOSTIGMINE BROMIDE 60 MG: 60 TABLET ORAL at 09:25

## 2017-06-03 RX ADMIN — Medication: at 11:47

## 2017-06-03 RX ADMIN — Medication 5 MCG/MIN: at 19:09

## 2017-06-03 RX ADMIN — CALCIUM GLUCONATE 2 G: 94 INJECTION, SOLUTION INTRAVENOUS at 18:41

## 2017-06-03 RX ADMIN — PYRIDOSTIGMINE BROMIDE 60 MG: 60 TABLET ORAL at 23:05

## 2017-06-03 RX ADMIN — HEPARIN SODIUM 5000 UNITS: 5000 INJECTION, SOLUTION INTRAVENOUS; SUBCUTANEOUS at 05:58

## 2017-06-03 RX ADMIN — HEPARIN SODIUM 15000 UNITS: 5000 INJECTION, SOLUTION INTRAVENOUS; SUBCUTANEOUS at 18:41

## 2017-06-03 RX ADMIN — PYRIDOSTIGMINE BROMIDE 60 MG: 60 TABLET ORAL at 14:49

## 2017-06-03 RX ADMIN — HEPARIN SODIUM 5000 UNITS: 5000 INJECTION, SOLUTION INTRAVENOUS; SUBCUTANEOUS at 14:48

## 2017-06-03 RX ADMIN — SODIUM CHLORIDE: 9 INJECTION, SOLUTION INTRAVENOUS at 11:47

## 2017-06-03 RX ADMIN — LIDOCAINE HYDROCHLORIDE: 10 INJECTION, SOLUTION EPIDURAL; INFILTRATION; INTRACAUDAL; PERINEURAL at 11:47

## 2017-06-03 RX ADMIN — HEPARIN SODIUM 5000 UNITS: 5000 INJECTION, SOLUTION INTRAVENOUS; SUBCUTANEOUS at 21:15

## 2017-06-03 RX ADMIN — SODIUM CHLORIDE, PRESERVATIVE FREE 10 ML: 5 INJECTION INTRAVENOUS at 09:25

## 2017-06-03 RX ADMIN — TRAMADOL HYDROCHLORIDE 50 MG: 50 TABLET, FILM COATED ORAL at 15:02

## 2017-06-03 ASSESSMENT — PAIN SCALES - GENERAL: PAINLEVEL_OUTOF10: 8

## 2017-06-04 LAB
ABSOLUTE EOS #: 0.11 K/UL (ref 0–0.4)
ABSOLUTE LYMPH #: 1.29 K/UL (ref 1–4.8)
ABSOLUTE MONO #: 0.28 K/UL (ref 0.1–0.8)
ANION GAP SERPL CALCULATED.3IONS-SCNC: 10 MMOL/L (ref 9–17)
BASOPHILS # BLD: 0 %
BASOPHILS ABSOLUTE: 0 K/UL (ref 0–0.2)
BUN BLDV-MCNC: 3 MG/DL (ref 8–23)
BUN/CREAT BLD: ABNORMAL (ref 9–20)
CALCIUM SERPL-MCNC: 8.4 MG/DL (ref 8.6–10.4)
CHLORIDE BLD-SCNC: 109 MMOL/L (ref 98–107)
CO2: 22 MMOL/L (ref 20–31)
CREAT SERPL-MCNC: 0.26 MG/DL (ref 0.5–0.9)
DIFFERENTIAL TYPE: ABNORMAL
EOSINOPHILS RELATIVE PERCENT: 2 %
GFR AFRICAN AMERICAN: >60 ML/MIN
GFR NON-AFRICAN AMERICAN: >60 ML/MIN
GFR SERPL CREATININE-BSD FRML MDRD: ABNORMAL ML/MIN/{1.73_M2}
GFR SERPL CREATININE-BSD FRML MDRD: ABNORMAL ML/MIN/{1.73_M2}
GLUCOSE BLD-MCNC: 132 MG/DL (ref 70–99)
HCT VFR BLD CALC: 29.8 % (ref 36–46)
HEMOGLOBIN: 9.1 G/DL (ref 12–16)
LYMPHOCYTES # BLD: 23 %
MCH RBC QN AUTO: 22.7 PG (ref 26–34)
MCHC RBC AUTO-ENTMCNC: 30.6 G/DL (ref 31–37)
MCV RBC AUTO: 74.2 FL (ref 80–100)
MONOCYTES # BLD: 5 %
MORPHOLOGY: ABNORMAL
PDW BLD-RTO: 23.5 % (ref 12.5–15.4)
PLATELET # BLD: 196 K/UL (ref 140–450)
PLATELET ESTIMATE: ABNORMAL
PMV BLD AUTO: 9.4 FL (ref 6–12)
POTASSIUM SERPL-SCNC: 3.8 MMOL/L (ref 3.7–5.3)
RBC # BLD: 4.02 M/UL (ref 4–5.2)
RBC # BLD: ABNORMAL 10*6/UL
SEG NEUTROPHILS: 70 %
SEGMENTED NEUTROPHILS ABSOLUTE COUNT: 3.92 K/UL (ref 1.8–7.7)
SODIUM BLD-SCNC: 141 MMOL/L (ref 135–144)
WBC # BLD: 5.6 K/UL (ref 3.5–11)
WBC # BLD: ABNORMAL 10*3/UL

## 2017-06-04 PROCEDURE — 97530 THERAPEUTIC ACTIVITIES: CPT

## 2017-06-04 PROCEDURE — 2580000003 HC RX 258: Performed by: FAMILY MEDICINE

## 2017-06-04 PROCEDURE — 6360000002 HC RX W HCPCS: Performed by: HOSPITALIST

## 2017-06-04 PROCEDURE — 80048 BASIC METABOLIC PNL TOTAL CA: CPT

## 2017-06-04 PROCEDURE — G8988 SELF CARE GOAL STATUS: HCPCS

## 2017-06-04 PROCEDURE — 99233 SBSQ HOSP IP/OBS HIGH 50: CPT | Performed by: INTERNAL MEDICINE

## 2017-06-04 PROCEDURE — 2000000003 HC NEURO ICU R&B

## 2017-06-04 PROCEDURE — G8987 SELF CARE CURRENT STATUS: HCPCS

## 2017-06-04 PROCEDURE — 6370000000 HC RX 637 (ALT 250 FOR IP): Performed by: STUDENT IN AN ORGANIZED HEALTH CARE EDUCATION/TRAINING PROGRAM

## 2017-06-04 PROCEDURE — 85025 COMPLETE CBC W/AUTO DIFF WBC: CPT

## 2017-06-04 PROCEDURE — 6370000000 HC RX 637 (ALT 250 FOR IP): Performed by: FAMILY MEDICINE

## 2017-06-04 PROCEDURE — 2580000003 HC RX 258: Performed by: STUDENT IN AN ORGANIZED HEALTH CARE EDUCATION/TRAINING PROGRAM

## 2017-06-04 PROCEDURE — 99232 SBSQ HOSP IP/OBS MODERATE 35: CPT | Performed by: FAMILY MEDICINE

## 2017-06-04 PROCEDURE — 2580000003 HC RX 258: Performed by: HOSPITALIST

## 2017-06-04 PROCEDURE — 94660 CPAP INITIATION&MGMT: CPT

## 2017-06-04 PROCEDURE — 94799 UNLISTED PULMONARY SVC/PX: CPT

## 2017-06-04 PROCEDURE — 99231 SBSQ HOSP IP/OBS SF/LOW 25: CPT

## 2017-06-04 PROCEDURE — 6360000002 HC RX W HCPCS: Performed by: FAMILY MEDICINE

## 2017-06-04 PROCEDURE — 36415 COLL VENOUS BLD VENIPUNCTURE: CPT

## 2017-06-04 PROCEDURE — 97167 OT EVAL HIGH COMPLEX 60 MIN: CPT

## 2017-06-04 PROCEDURE — 6370000000 HC RX 637 (ALT 250 FOR IP): Performed by: PSYCHIATRY & NEUROLOGY

## 2017-06-04 RX ORDER — 0.9 % SODIUM CHLORIDE 0.9 %
500 INTRAVENOUS SOLUTION INTRAVENOUS ONCE
Status: COMPLETED | OUTPATIENT
Start: 2017-06-04 | End: 2017-06-04

## 2017-06-04 RX ORDER — MIDODRINE HYDROCHLORIDE 2.5 MG/1
2.5 TABLET ORAL
Status: DISCONTINUED | OUTPATIENT
Start: 2017-06-04 | End: 2017-06-07

## 2017-06-04 RX ADMIN — PYRIDOSTIGMINE BROMIDE 60 MG: 60 TABLET ORAL at 14:44

## 2017-06-04 RX ADMIN — HEPARIN SODIUM 5000 UNITS: 5000 INJECTION, SOLUTION INTRAVENOUS; SUBCUTANEOUS at 14:44

## 2017-06-04 RX ADMIN — PYRIDOSTIGMINE BROMIDE 60 MG: 60 TABLET ORAL at 23:15

## 2017-06-04 RX ADMIN — HEPARIN SODIUM 5000 UNITS: 5000 INJECTION, SOLUTION INTRAVENOUS; SUBCUTANEOUS at 23:14

## 2017-06-04 RX ADMIN — MIDODRINE HYDROCHLORIDE 2.5 MG: 2.5 TABLET ORAL at 17:41

## 2017-06-04 RX ADMIN — PYRIDOSTIGMINE BROMIDE 60 MG: 60 TABLET ORAL at 09:38

## 2017-06-04 RX ADMIN — IRON SUCROSE 200 MG: 20 INJECTION, SOLUTION INTRAVENOUS at 10:12

## 2017-06-04 RX ADMIN — TRAMADOL HYDROCHLORIDE 50 MG: 50 TABLET, FILM COATED ORAL at 07:46

## 2017-06-04 RX ADMIN — HEPARIN SODIUM 5000 UNITS: 5000 INJECTION, SOLUTION INTRAVENOUS; SUBCUTANEOUS at 09:38

## 2017-06-04 RX ADMIN — SODIUM CHLORIDE 500 ML: 0.9 INJECTION, SOLUTION INTRAVENOUS at 13:50

## 2017-06-04 RX ADMIN — SODIUM CHLORIDE, PRESERVATIVE FREE 10 ML: 5 INJECTION INTRAVENOUS at 09:12

## 2017-06-04 ASSESSMENT — PAIN SCALES - GENERAL
PAINLEVEL_OUTOF10: 8
PAINLEVEL_OUTOF10: 8

## 2017-06-04 ASSESSMENT — PAIN DESCRIPTION - LOCATION: LOCATION: ARM

## 2017-06-04 ASSESSMENT — PAIN DESCRIPTION - ORIENTATION: ORIENTATION: RIGHT

## 2017-06-05 LAB
ABSOLUTE EOS #: 0.12 K/UL (ref 0–0.4)
ABSOLUTE LYMPH #: 1.48 K/UL (ref 1–4.8)
ABSOLUTE MONO #: 0.41 K/UL (ref 0.1–0.8)
ABSOLUTE RETIC #: 0.05 M/UL (ref 0.02–0.1)
ANION GAP SERPL CALCULATED.3IONS-SCNC: 10 MMOL/L (ref 9–17)
ANTI-NUCLEAR ANTIBODY (ANA): NEGATIVE
BASOPHILS # BLD: 0 %
BASOPHILS ABSOLUTE: 0 K/UL (ref 0–0.2)
BUN BLDV-MCNC: 3 MG/DL (ref 8–23)
BUN/CREAT BLD: ABNORMAL (ref 9–20)
CALCIUM IONIZED: 1.32 MMOL/L (ref 1.13–1.33)
CALCIUM SERPL-MCNC: 8.6 MG/DL (ref 8.6–10.4)
CHLORIDE BLD-SCNC: 107 MMOL/L (ref 98–107)
CO2: 23 MMOL/L (ref 20–31)
CREAT SERPL-MCNC: 0.2 MG/DL (ref 0.5–0.9)
DIFFERENTIAL TYPE: ABNORMAL
EOSINOPHILS RELATIVE PERCENT: 2 %
FIBRINOGEN: 213 MG/DL (ref 140–420)
GFR AFRICAN AMERICAN: >60 ML/MIN
GFR NON-AFRICAN AMERICAN: >60 ML/MIN
GFR SERPL CREATININE-BSD FRML MDRD: ABNORMAL ML/MIN/{1.73_M2}
GFR SERPL CREATININE-BSD FRML MDRD: ABNORMAL ML/MIN/{1.73_M2}
GLUCOSE BLD-MCNC: 109 MG/DL (ref 70–99)
HCT VFR BLD CALC: 28.6 % (ref 36–46)
HEMOGLOBIN: 8.9 G/DL (ref 12–16)
LV EF: 55 %
LVEF MODALITY: NORMAL
LYMPHOCYTES # BLD: 25 %
MCH RBC QN AUTO: 23 PG (ref 26–34)
MCHC RBC AUTO-ENTMCNC: 31.1 G/DL (ref 31–37)
MCV RBC AUTO: 74 FL (ref 80–100)
MONOCYTES # BLD: 7 %
MORPHOLOGY: ABNORMAL
PDW BLD-RTO: 23.6 % (ref 12.5–15.4)
PLATELET # BLD: 186 K/UL (ref 140–450)
PLATELET ESTIMATE: ABNORMAL
PMV BLD AUTO: 9.5 FL (ref 6–12)
POTASSIUM SERPL-SCNC: 3.6 MMOL/L (ref 3.7–5.3)
RBC # BLD: 3.87 M/UL (ref 4–5.2)
RBC # BLD: ABNORMAL 10*6/UL
RETIC %: 1.3 % (ref 0.5–2)
SEG NEUTROPHILS: 66 %
SEGMENTED NEUTROPHILS ABSOLUTE COUNT: 3.89 K/UL (ref 1.8–7.7)
SODIUM BLD-SCNC: 140 MMOL/L (ref 135–144)
SURGICAL PATHOLOGY REPORT: NORMAL
WBC # BLD: 5.9 K/UL (ref 3.5–11)
WBC # BLD: ABNORMAL 10*3/UL

## 2017-06-05 PROCEDURE — 6360000002 HC RX W HCPCS: Performed by: FAMILY MEDICINE

## 2017-06-05 PROCEDURE — G8998 SWALLOW D/C STATUS: HCPCS

## 2017-06-05 PROCEDURE — 99291 CRITICAL CARE FIRST HOUR: CPT | Performed by: INTERNAL MEDICINE

## 2017-06-05 PROCEDURE — G8997 SWALLOW GOAL STATUS: HCPCS

## 2017-06-05 PROCEDURE — 2580000003 HC RX 258: Performed by: FAMILY MEDICINE

## 2017-06-05 PROCEDURE — 85025 COMPLETE CBC W/AUTO DIFF WBC: CPT

## 2017-06-05 PROCEDURE — 6370000000 HC RX 637 (ALT 250 FOR IP): Performed by: STUDENT IN AN ORGANIZED HEALTH CARE EDUCATION/TRAINING PROGRAM

## 2017-06-05 PROCEDURE — 76937 US GUIDE VASCULAR ACCESS: CPT

## 2017-06-05 PROCEDURE — 92610 EVALUATE SWALLOWING FUNCTION: CPT

## 2017-06-05 PROCEDURE — 85045 AUTOMATED RETICULOCYTE COUNT: CPT

## 2017-06-05 PROCEDURE — 2000000003 HC NEURO ICU R&B

## 2017-06-05 PROCEDURE — P9045 ALBUMIN (HUMAN), 5%, 250 ML: HCPCS

## 2017-06-05 PROCEDURE — 85384 FIBRINOGEN ACTIVITY: CPT

## 2017-06-05 PROCEDURE — 6360000002 HC RX W HCPCS

## 2017-06-05 PROCEDURE — 99232 SBSQ HOSP IP/OBS MODERATE 35: CPT | Performed by: FAMILY MEDICINE

## 2017-06-05 PROCEDURE — 6370000000 HC RX 637 (ALT 250 FOR IP): Performed by: HOSPITALIST

## 2017-06-05 PROCEDURE — 80048 BASIC METABOLIC PNL TOTAL CA: CPT

## 2017-06-05 PROCEDURE — 6370000000 HC RX 637 (ALT 250 FOR IP): Performed by: FAMILY MEDICINE

## 2017-06-05 PROCEDURE — 99233 SBSQ HOSP IP/OBS HIGH 50: CPT | Performed by: PSYCHIATRY & NEUROLOGY

## 2017-06-05 PROCEDURE — 36515 HC APHERESIS THER W/PLASMA REINF: CPT

## 2017-06-05 PROCEDURE — 93306 TTE W/DOPPLER COMPLETE: CPT

## 2017-06-05 PROCEDURE — 36415 COLL VENOUS BLD VENIPUNCTURE: CPT

## 2017-06-05 PROCEDURE — 92507 TX SP LANG VOICE COMM INDIV: CPT

## 2017-06-05 PROCEDURE — 97110 THERAPEUTIC EXERCISES: CPT

## 2017-06-05 PROCEDURE — 82330 ASSAY OF CALCIUM: CPT

## 2017-06-05 PROCEDURE — G8996 SWALLOW CURRENT STATUS: HCPCS

## 2017-06-05 PROCEDURE — 6370000000 HC RX 637 (ALT 250 FOR IP): Performed by: PSYCHIATRY & NEUROLOGY

## 2017-06-05 RX ADMIN — HEPARIN SODIUM 5000 UNITS: 5000 INJECTION, SOLUTION INTRAVENOUS; SUBCUTANEOUS at 15:41

## 2017-06-05 RX ADMIN — HEPARIN SODIUM 5000 UNITS: 5000 INJECTION, SOLUTION INTRAVENOUS; SUBCUTANEOUS at 21:57

## 2017-06-05 RX ADMIN — MIDODRINE HYDROCHLORIDE 2.5 MG: 2.5 TABLET ORAL at 17:45

## 2017-06-05 RX ADMIN — PYRIDOSTIGMINE BROMIDE 60 MG: 60 TABLET ORAL at 23:54

## 2017-06-05 RX ADMIN — Medication 10 ML: at 09:09

## 2017-06-05 RX ADMIN — TRAMADOL HYDROCHLORIDE 50 MG: 50 TABLET, FILM COATED ORAL at 14:48

## 2017-06-05 RX ADMIN — PYRIDOSTIGMINE BROMIDE 60 MG: 60 TABLET ORAL at 06:36

## 2017-06-05 RX ADMIN — PYRIDOSTIGMINE BROMIDE 60 MG: 60 TABLET ORAL at 14:48

## 2017-06-05 RX ADMIN — ALBUMIN (HUMAN) 5000 ML: 12.5 INJECTION, SOLUTION INTRAVENOUS at 17:00

## 2017-06-05 RX ADMIN — HEPARIN SODIUM 15000 UNITS: 5000 INJECTION, SOLUTION INTRAVENOUS; SUBCUTANEOUS at 17:00

## 2017-06-05 RX ADMIN — TRAMADOL HYDROCHLORIDE 50 MG: 50 TABLET, FILM COATED ORAL at 05:00

## 2017-06-05 RX ADMIN — Medication 10 ML: at 21:57

## 2017-06-05 RX ADMIN — MIDODRINE HYDROCHLORIDE 2.5 MG: 2.5 TABLET ORAL at 09:08

## 2017-06-05 RX ADMIN — HEPARIN SODIUM 5000 UNITS: 5000 INJECTION, SOLUTION INTRAVENOUS; SUBCUTANEOUS at 05:00

## 2017-06-05 ASSESSMENT — PAIN SCALES - GENERAL
PAINLEVEL_OUTOF10: 0
PAINLEVEL_OUTOF10: 6

## 2017-06-06 LAB
ABSOLUTE EOS #: 0.26 K/UL (ref 0–0.4)
ABSOLUTE LYMPH #: 1.48 K/UL (ref 1–4.8)
ABSOLUTE MONO #: 0.41 K/UL (ref 0.1–0.8)
ANION GAP SERPL CALCULATED.3IONS-SCNC: 8 MMOL/L (ref 9–17)
BASOPHILS # BLD: 1 %
BASOPHILS ABSOLUTE: 0.05 K/UL (ref 0–0.2)
BUN BLDV-MCNC: 2 MG/DL (ref 8–23)
BUN/CREAT BLD: ABNORMAL (ref 9–20)
CALCIUM SERPL-MCNC: 8.5 MG/DL (ref 8.6–10.4)
CHLORIDE BLD-SCNC: 106 MMOL/L (ref 98–107)
CO2: 27 MMOL/L (ref 20–31)
CREAT SERPL-MCNC: <0.2 MG/DL (ref 0.5–0.9)
DIFFERENTIAL TYPE: ABNORMAL
EOSINOPHILS RELATIVE PERCENT: 5 %
GFR AFRICAN AMERICAN: ABNORMAL ML/MIN
GFR NON-AFRICAN AMERICAN: ABNORMAL ML/MIN
GFR SERPL CREATININE-BSD FRML MDRD: ABNORMAL ML/MIN/{1.73_M2}
GFR SERPL CREATININE-BSD FRML MDRD: ABNORMAL ML/MIN/{1.73_M2}
GLUCOSE BLD-MCNC: 90 MG/DL (ref 70–99)
HCT VFR BLD CALC: 27.5 % (ref 36–46)
HEMOGLOBIN: 8.4 G/DL (ref 12–16)
LYME ANTIBODY: 0.46
LYMPHOCYTES # BLD: 29 %
MCH RBC QN AUTO: 23 PG (ref 26–34)
MCHC RBC AUTO-ENTMCNC: 30.7 G/DL (ref 31–37)
MCV RBC AUTO: 75 FL (ref 80–100)
MONOCYTES # BLD: 8 %
MORPHOLOGY: ABNORMAL
PATHOLOGIST REVIEW: NORMAL
PDW BLD-RTO: 23.7 % (ref 12.5–15.4)
PLATELET # BLD: 172 K/UL (ref 140–450)
PLATELET ESTIMATE: ABNORMAL
PMV BLD AUTO: 9.4 FL (ref 6–12)
POTASSIUM SERPL-SCNC: 3.6 MMOL/L (ref 3.7–5.3)
RBC # BLD: 3.66 M/UL (ref 4–5.2)
RBC # BLD: ABNORMAL 10*6/UL
SEG NEUTROPHILS: 57 %
SEGMENTED NEUTROPHILS ABSOLUTE COUNT: 2.9 K/UL (ref 1.8–7.7)
SODIUM BLD-SCNC: 141 MMOL/L (ref 135–144)
WBC # BLD: 5.1 K/UL (ref 3.5–11)
WBC # BLD: ABNORMAL 10*3/UL

## 2017-06-06 PROCEDURE — 6360000002 HC RX W HCPCS: Performed by: FAMILY MEDICINE

## 2017-06-06 PROCEDURE — 99232 SBSQ HOSP IP/OBS MODERATE 35: CPT | Performed by: PSYCHIATRY & NEUROLOGY

## 2017-06-06 PROCEDURE — 6370000000 HC RX 637 (ALT 250 FOR IP): Performed by: HOSPITALIST

## 2017-06-06 PROCEDURE — 99233 SBSQ HOSP IP/OBS HIGH 50: CPT | Performed by: INTERNAL MEDICINE

## 2017-06-06 PROCEDURE — 2060000000 HC ICU INTERMEDIATE R&B

## 2017-06-06 PROCEDURE — 80048 BASIC METABOLIC PNL TOTAL CA: CPT

## 2017-06-06 PROCEDURE — 2580000003 HC RX 258: Performed by: FAMILY MEDICINE

## 2017-06-06 PROCEDURE — 97530 THERAPEUTIC ACTIVITIES: CPT

## 2017-06-06 PROCEDURE — 2580000003 HC RX 258: Performed by: HOSPITALIST

## 2017-06-06 PROCEDURE — 6370000000 HC RX 637 (ALT 250 FOR IP): Performed by: FAMILY MEDICINE

## 2017-06-06 PROCEDURE — 85025 COMPLETE CBC W/AUTO DIFF WBC: CPT

## 2017-06-06 PROCEDURE — 36415 COLL VENOUS BLD VENIPUNCTURE: CPT

## 2017-06-06 PROCEDURE — 6370000000 HC RX 637 (ALT 250 FOR IP): Performed by: PSYCHIATRY & NEUROLOGY

## 2017-06-06 PROCEDURE — 99232 SBSQ HOSP IP/OBS MODERATE 35: CPT | Performed by: FAMILY MEDICINE

## 2017-06-06 PROCEDURE — 97110 THERAPEUTIC EXERCISES: CPT

## 2017-06-06 RX ORDER — PREDNISONE 20 MG/1
20 TABLET ORAL DAILY
Status: DISCONTINUED | OUTPATIENT
Start: 2017-08-01 | End: 2017-06-11 | Stop reason: HOSPADM

## 2017-06-06 RX ORDER — PREDNISONE 50 MG/1
50 TABLET ORAL DAILY
Status: DISCONTINUED | OUTPATIENT
Start: 2017-06-20 | End: 2017-06-11 | Stop reason: HOSPADM

## 2017-06-06 RX ORDER — PREDNISONE 20 MG/1
40 TABLET ORAL DAILY
Status: DISCONTINUED | OUTPATIENT
Start: 2017-07-04 | End: 2017-06-11 | Stop reason: HOSPADM

## 2017-06-06 RX ORDER — PREDNISONE 10 MG/1
10 TABLET ORAL DAILY
Status: DISCONTINUED | OUTPATIENT
Start: 2017-08-15 | End: 2017-06-11 | Stop reason: HOSPADM

## 2017-06-06 RX ADMIN — HEPARIN SODIUM 5000 UNITS: 5000 INJECTION, SOLUTION INTRAVENOUS; SUBCUTANEOUS at 14:16

## 2017-06-06 RX ADMIN — Medication 10 ML: at 07:46

## 2017-06-06 RX ADMIN — HEPARIN SODIUM 5000 UNITS: 5000 INJECTION, SOLUTION INTRAVENOUS; SUBCUTANEOUS at 06:15

## 2017-06-06 RX ADMIN — PYRIDOSTIGMINE BROMIDE 60 MG: 60 TABLET ORAL at 21:46

## 2017-06-06 RX ADMIN — HEPARIN SODIUM 5000 UNITS: 5000 INJECTION, SOLUTION INTRAVENOUS; SUBCUTANEOUS at 21:46

## 2017-06-06 RX ADMIN — MIDODRINE HYDROCHLORIDE 2.5 MG: 2.5 TABLET ORAL at 16:23

## 2017-06-06 RX ADMIN — MIDODRINE HYDROCHLORIDE 2.5 MG: 2.5 TABLET ORAL at 12:23

## 2017-06-06 RX ADMIN — TRAMADOL HYDROCHLORIDE 50 MG: 50 TABLET, FILM COATED ORAL at 17:38

## 2017-06-06 RX ADMIN — Medication 10 ML: at 21:46

## 2017-06-06 RX ADMIN — PYRIDOSTIGMINE BROMIDE 60 MG: 60 TABLET ORAL at 07:42

## 2017-06-06 RX ADMIN — PYRIDOSTIGMINE BROMIDE 60 MG: 60 TABLET ORAL at 14:16

## 2017-06-06 RX ADMIN — PREDNISONE 60 MG: 50 TABLET ORAL at 16:22

## 2017-06-06 RX ADMIN — SODIUM CHLORIDE 75 ML/HR: 9 INJECTION, SOLUTION INTRAVENOUS at 14:19

## 2017-06-06 RX ADMIN — TRAMADOL HYDROCHLORIDE 50 MG: 50 TABLET, FILM COATED ORAL at 03:33

## 2017-06-06 RX ADMIN — MIDODRINE HYDROCHLORIDE 2.5 MG: 2.5 TABLET ORAL at 07:41

## 2017-06-06 ASSESSMENT — PAIN SCALES - GENERAL
PAINLEVEL_OUTOF10: 0
PAINLEVEL_OUTOF10: 0
PAINLEVEL_OUTOF10: 6
PAINLEVEL_OUTOF10: 6

## 2017-06-06 ASSESSMENT — PAIN DESCRIPTION - DESCRIPTORS: DESCRIPTORS: ACHING

## 2017-06-06 ASSESSMENT — PAIN DESCRIPTION - FREQUENCY: FREQUENCY: INTERMITTENT

## 2017-06-06 ASSESSMENT — PAIN DESCRIPTION - ORIENTATION: ORIENTATION: RIGHT

## 2017-06-06 ASSESSMENT — PAIN DESCRIPTION - LOCATION: LOCATION: ARM

## 2017-06-06 ASSESSMENT — PAIN DESCRIPTION - PAIN TYPE: TYPE: CHRONIC PAIN

## 2017-06-07 LAB
ABSOLUTE EOS #: 0 K/UL (ref 0–0.4)
ABSOLUTE LYMPH #: 1.82 K/UL (ref 1–4.8)
ABSOLUTE MONO #: 0.31 K/UL (ref 0.1–0.8)
ANION GAP SERPL CALCULATED.3IONS-SCNC: 16 MMOL/L (ref 9–17)
BASOPHILS # BLD: 1 %
BASOPHILS ABSOLUTE: 0.05 K/UL (ref 0–0.2)
BUN BLDV-MCNC: 2 MG/DL (ref 8–23)
BUN/CREAT BLD: ABNORMAL (ref 9–20)
CALCIUM IONIZED: 1.22 MMOL/L (ref 1.13–1.33)
CALCIUM SERPL-MCNC: 9.6 MG/DL (ref 8.6–10.4)
CHLORIDE BLD-SCNC: 98 MMOL/L (ref 98–107)
CO2: 27 MMOL/L (ref 20–31)
CREAT SERPL-MCNC: 0.24 MG/DL (ref 0.5–0.9)
DIFFERENTIAL TYPE: ABNORMAL
EOSINOPHILS RELATIVE PERCENT: 0 %
FIBRINOGEN: 170 MG/DL (ref 140–420)
GFR AFRICAN AMERICAN: >60 ML/MIN
GFR NON-AFRICAN AMERICAN: >60 ML/MIN
GFR SERPL CREATININE-BSD FRML MDRD: ABNORMAL ML/MIN/{1.73_M2}
GFR SERPL CREATININE-BSD FRML MDRD: ABNORMAL ML/MIN/{1.73_M2}
GLUCOSE BLD-MCNC: 105 MG/DL (ref 70–99)
HCT VFR BLD CALC: 30.4 % (ref 36–46)
HEMOGLOBIN: 9.4 G/DL (ref 12–16)
LYMPHOCYTES # BLD: 35 %
MCH RBC QN AUTO: 23.2 PG (ref 26–34)
MCHC RBC AUTO-ENTMCNC: 31.1 G/DL (ref 31–37)
MCV RBC AUTO: 74.5 FL (ref 80–100)
MONOCYTES # BLD: 6 %
MORPHOLOGY: ABNORMAL
PDW BLD-RTO: 23.5 % (ref 12.5–15.4)
PLATELET # BLD: 197 K/UL (ref 140–450)
PLATELET ESTIMATE: ABNORMAL
PMV BLD AUTO: 9.8 FL (ref 6–12)
POTASSIUM SERPL-SCNC: 3.6 MMOL/L (ref 3.7–5.3)
RBC # BLD: 4.08 M/UL (ref 4–5.2)
RBC # BLD: ABNORMAL 10*6/UL
SEG NEUTROPHILS: 58 %
SEGMENTED NEUTROPHILS ABSOLUTE COUNT: 3.02 K/UL (ref 1.8–7.7)
SODIUM BLD-SCNC: 141 MMOL/L (ref 135–144)
WBC # BLD: 5.2 K/UL (ref 3.5–11)
WBC # BLD: ABNORMAL 10*3/UL

## 2017-06-07 PROCEDURE — 99232 SBSQ HOSP IP/OBS MODERATE 35: CPT | Performed by: FAMILY MEDICINE

## 2017-06-07 PROCEDURE — 36415 COLL VENOUS BLD VENIPUNCTURE: CPT

## 2017-06-07 PROCEDURE — 2580000003 HC RX 258: Performed by: FAMILY MEDICINE

## 2017-06-07 PROCEDURE — 6370000000 HC RX 637 (ALT 250 FOR IP): Performed by: PSYCHIATRY & NEUROLOGY

## 2017-06-07 PROCEDURE — 6370000000 HC RX 637 (ALT 250 FOR IP): Performed by: FAMILY MEDICINE

## 2017-06-07 PROCEDURE — 2580000003 HC RX 258

## 2017-06-07 PROCEDURE — 94799 UNLISTED PULMONARY SVC/PX: CPT

## 2017-06-07 PROCEDURE — 2060000000 HC ICU INTERMEDIATE R&B

## 2017-06-07 PROCEDURE — 99232 SBSQ HOSP IP/OBS MODERATE 35: CPT | Performed by: PSYCHIATRY & NEUROLOGY

## 2017-06-07 PROCEDURE — 97535 SELF CARE MNGMENT TRAINING: CPT

## 2017-06-07 PROCEDURE — 6360000002 HC RX W HCPCS: Performed by: FAMILY MEDICINE

## 2017-06-07 PROCEDURE — 99232 SBSQ HOSP IP/OBS MODERATE 35: CPT | Performed by: INTERNAL MEDICINE

## 2017-06-07 PROCEDURE — P9041 ALBUMIN (HUMAN),5%, 50ML: HCPCS

## 2017-06-07 PROCEDURE — 82330 ASSAY OF CALCIUM: CPT

## 2017-06-07 PROCEDURE — 6360000002 HC RX W HCPCS

## 2017-06-07 PROCEDURE — 85384 FIBRINOGEN ACTIVITY: CPT

## 2017-06-07 PROCEDURE — 85025 COMPLETE CBC W/AUTO DIFF WBC: CPT

## 2017-06-07 PROCEDURE — 97110 THERAPEUTIC EXERCISES: CPT

## 2017-06-07 PROCEDURE — 36515 HC APHERESIS THER W/PLASMA REINF: CPT

## 2017-06-07 PROCEDURE — 80048 BASIC METABOLIC PNL TOTAL CA: CPT

## 2017-06-07 RX ORDER — MIDODRINE HYDROCHLORIDE 2.5 MG/1
2.5 TABLET ORAL
Status: COMPLETED | OUTPATIENT
Start: 2017-06-07 | End: 2017-06-08

## 2017-06-07 RX ADMIN — TRAMADOL HYDROCHLORIDE 50 MG: 50 TABLET, FILM COATED ORAL at 09:06

## 2017-06-07 RX ADMIN — HEPARIN SODIUM 5000 UNITS: 5000 INJECTION, SOLUTION INTRAVENOUS; SUBCUTANEOUS at 22:54

## 2017-06-07 RX ADMIN — SODIUM CHLORIDE: 0.9 INJECTION, SOLUTION INTRAVENOUS at 20:00

## 2017-06-07 RX ADMIN — HEPARIN SODIUM 5000 UNITS: 5000 INJECTION, SOLUTION INTRAVENOUS; SUBCUTANEOUS at 14:51

## 2017-06-07 RX ADMIN — CALCIUM GLUCONATE 2 G: 94 INJECTION, SOLUTION INTRAVENOUS at 19:30

## 2017-06-07 RX ADMIN — HEPARIN SODIUM 5000 UNITS: 5000 INJECTION, SOLUTION INTRAVENOUS; SUBCUTANEOUS at 06:37

## 2017-06-07 RX ADMIN — PREDNISONE 60 MG: 50 TABLET ORAL at 09:09

## 2017-06-07 RX ADMIN — Medication 10 ML: at 20:57

## 2017-06-07 RX ADMIN — PYRIDOSTIGMINE BROMIDE 60 MG: 60 TABLET ORAL at 16:50

## 2017-06-07 RX ADMIN — MIDODRINE HYDROCHLORIDE 2.5 MG: 2.5 TABLET ORAL at 20:55

## 2017-06-07 RX ADMIN — HEPARIN SODIUM 15000 UNITS: 5000 INJECTION, SOLUTION INTRAVENOUS; SUBCUTANEOUS at 19:30

## 2017-06-07 RX ADMIN — PYRIDOSTIGMINE BROMIDE 60 MG: 60 TABLET ORAL at 06:37

## 2017-06-07 RX ADMIN — ALBUMIN (HUMAN) 5000 ML: 12.5 INJECTION, SOLUTION INTRAVENOUS at 19:48

## 2017-06-07 RX ADMIN — POTASSIUM CHLORIDE 40 MEQ: 40 SOLUTION ORAL at 09:10

## 2017-06-07 RX ADMIN — PYRIDOSTIGMINE BROMIDE 60 MG: 60 TABLET ORAL at 22:54

## 2017-06-07 ASSESSMENT — PAIN SCALES - GENERAL
PAINLEVEL_OUTOF10: 7

## 2017-06-07 ASSESSMENT — PAIN DESCRIPTION - PAIN TYPE: TYPE: CHRONIC PAIN

## 2017-06-07 ASSESSMENT — PAIN DESCRIPTION - LOCATION: LOCATION: ARM

## 2017-06-07 ASSESSMENT — PAIN DESCRIPTION - DESCRIPTORS: DESCRIPTORS: ACHING

## 2017-06-08 LAB
ABSOLUTE EOS #: 0.12 K/UL (ref 0–0.4)
ABSOLUTE LYMPH #: 2.58 K/UL (ref 1–4.8)
ABSOLUTE MONO #: 0.66 K/UL (ref 0.1–0.8)
ANION GAP SERPL CALCULATED.3IONS-SCNC: 11 MMOL/L (ref 9–17)
BASOPHILS # BLD: 1 %
BASOPHILS ABSOLUTE: 0.06 K/UL (ref 0–0.2)
BUN BLDV-MCNC: 4 MG/DL (ref 8–23)
BUN/CREAT BLD: ABNORMAL (ref 9–20)
CALCIUM SERPL-MCNC: 9.2 MG/DL (ref 8.6–10.4)
CHLORIDE BLD-SCNC: 103 MMOL/L (ref 98–107)
CO2: 28 MMOL/L (ref 20–31)
CREAT SERPL-MCNC: 0.24 MG/DL (ref 0.5–0.9)
DIFFERENTIAL TYPE: ABNORMAL
EOSINOPHILS RELATIVE PERCENT: 2 %
GFR AFRICAN AMERICAN: >60 ML/MIN
GFR NON-AFRICAN AMERICAN: >60 ML/MIN
GFR SERPL CREATININE-BSD FRML MDRD: ABNORMAL ML/MIN/{1.73_M2}
GFR SERPL CREATININE-BSD FRML MDRD: ABNORMAL ML/MIN/{1.73_M2}
GLUCOSE BLD-MCNC: 85 MG/DL (ref 70–99)
HCT VFR BLD CALC: 28.2 % (ref 36–46)
HEMOGLOBIN: 8.8 G/DL (ref 12–16)
LYMPHOCYTES # BLD: 43 %
MCH RBC QN AUTO: 23.3 PG (ref 26–34)
MCHC RBC AUTO-ENTMCNC: 31.2 G/DL (ref 31–37)
MCV RBC AUTO: 74.5 FL (ref 80–100)
MONOCYTES # BLD: 11 %
MORPHOLOGY: ABNORMAL
PDW BLD-RTO: 24.3 % (ref 12.5–15.4)
PLATELET # BLD: 212 K/UL (ref 140–450)
PLATELET ESTIMATE: ABNORMAL
PMV BLD AUTO: 9.1 FL (ref 6–12)
POTASSIUM SERPL-SCNC: 3.6 MMOL/L (ref 3.7–5.3)
RBC # BLD: 3.79 M/UL (ref 4–5.2)
RBC # BLD: ABNORMAL 10*6/UL
SEG NEUTROPHILS: 43 %
SEGMENTED NEUTROPHILS ABSOLUTE COUNT: 2.58 K/UL (ref 1.8–7.7)
SODIUM BLD-SCNC: 142 MMOL/L (ref 135–144)
WBC # BLD: 6 K/UL (ref 3.5–11)
WBC # BLD: ABNORMAL 10*3/UL

## 2017-06-08 PROCEDURE — 6360000002 HC RX W HCPCS: Performed by: FAMILY MEDICINE

## 2017-06-08 PROCEDURE — 97535 SELF CARE MNGMENT TRAINING: CPT

## 2017-06-08 PROCEDURE — 6370000000 HC RX 637 (ALT 250 FOR IP): Performed by: PSYCHIATRY & NEUROLOGY

## 2017-06-08 PROCEDURE — 97530 THERAPEUTIC ACTIVITIES: CPT

## 2017-06-08 PROCEDURE — 80048 BASIC METABOLIC PNL TOTAL CA: CPT

## 2017-06-08 PROCEDURE — 85025 COMPLETE CBC W/AUTO DIFF WBC: CPT

## 2017-06-08 PROCEDURE — 99232 SBSQ HOSP IP/OBS MODERATE 35: CPT | Performed by: INTERNAL MEDICINE

## 2017-06-08 PROCEDURE — 97110 THERAPEUTIC EXERCISES: CPT

## 2017-06-08 PROCEDURE — 6370000000 HC RX 637 (ALT 250 FOR IP): Performed by: HOSPITALIST

## 2017-06-08 PROCEDURE — 36415 COLL VENOUS BLD VENIPUNCTURE: CPT

## 2017-06-08 PROCEDURE — 99232 SBSQ HOSP IP/OBS MODERATE 35: CPT | Performed by: PSYCHIATRY & NEUROLOGY

## 2017-06-08 PROCEDURE — 2580000003 HC RX 258: Performed by: FAMILY MEDICINE

## 2017-06-08 PROCEDURE — 99232 SBSQ HOSP IP/OBS MODERATE 35: CPT | Performed by: FAMILY MEDICINE

## 2017-06-08 PROCEDURE — 94799 UNLISTED PULMONARY SVC/PX: CPT

## 2017-06-08 PROCEDURE — 2060000000 HC ICU INTERMEDIATE R&B

## 2017-06-08 PROCEDURE — 92526 ORAL FUNCTION THERAPY: CPT

## 2017-06-08 RX ORDER — LANOLIN ALCOHOL/MO/W.PET/CERES
325 CREAM (GRAM) TOPICAL
Status: DISCONTINUED | OUTPATIENT
Start: 2017-06-08 | End: 2017-06-11 | Stop reason: HOSPADM

## 2017-06-08 RX ADMIN — HEPARIN SODIUM 5000 UNITS: 5000 INJECTION, SOLUTION INTRAVENOUS; SUBCUTANEOUS at 06:43

## 2017-06-08 RX ADMIN — PREDNISONE 60 MG: 50 TABLET ORAL at 08:22

## 2017-06-08 RX ADMIN — HEPARIN SODIUM 5000 UNITS: 5000 INJECTION, SOLUTION INTRAVENOUS; SUBCUTANEOUS at 21:55

## 2017-06-08 RX ADMIN — FERROUS SULFATE TAB EC 325 MG (65 MG FE EQUIVALENT) 325 MG: 325 (65 FE) TABLET DELAYED RESPONSE at 09:35

## 2017-06-08 RX ADMIN — Medication 10 ML: at 21:58

## 2017-06-08 RX ADMIN — PYRIDOSTIGMINE BROMIDE 60 MG: 60 TABLET ORAL at 14:41

## 2017-06-08 RX ADMIN — MIDODRINE HYDROCHLORIDE 2.5 MG: 2.5 TABLET ORAL at 12:26

## 2017-06-08 RX ADMIN — PYRIDOSTIGMINE BROMIDE 60 MG: 60 TABLET ORAL at 06:43

## 2017-06-08 RX ADMIN — PYRIDOSTIGMINE BROMIDE 60 MG: 60 TABLET ORAL at 21:55

## 2017-06-08 RX ADMIN — Medication 10 ML: at 08:23

## 2017-06-08 RX ADMIN — MIDODRINE HYDROCHLORIDE 2.5 MG: 2.5 TABLET ORAL at 08:22

## 2017-06-08 RX ADMIN — HEPARIN SODIUM 5000 UNITS: 5000 INJECTION, SOLUTION INTRAVENOUS; SUBCUTANEOUS at 14:41

## 2017-06-08 ASSESSMENT — PAIN SCALES - GENERAL: PAINLEVEL_OUTOF10: 6

## 2017-06-09 LAB
ABSOLUTE EOS #: 0.06 K/UL (ref 0–0.4)
ABSOLUTE LYMPH #: 2.69 K/UL (ref 1–4.8)
ABSOLUTE MONO #: 0.64 K/UL (ref 0.1–0.8)
ANION GAP SERPL CALCULATED.3IONS-SCNC: 12 MMOL/L (ref 9–17)
BASOPHILS # BLD: 1 %
BASOPHILS ABSOLUTE: 0.06 K/UL (ref 0–0.2)
BUN BLDV-MCNC: 7 MG/DL (ref 8–23)
BUN/CREAT BLD: ABNORMAL (ref 9–20)
CALCIUM IONIZED: 1.2 MMOL/L (ref 1.13–1.33)
CALCIUM SERPL-MCNC: 9.4 MG/DL (ref 8.6–10.4)
CHLORIDE BLD-SCNC: 101 MMOL/L (ref 98–107)
CO2: 30 MMOL/L (ref 20–31)
CREAT SERPL-MCNC: 0.25 MG/DL (ref 0.5–0.9)
DIFFERENTIAL TYPE: ABNORMAL
EOSINOPHILS RELATIVE PERCENT: 1 %
FIBRINOGEN: 99 MG/DL (ref 140–420)
GFR AFRICAN AMERICAN: >60 ML/MIN
GFR NON-AFRICAN AMERICAN: >60 ML/MIN
GFR SERPL CREATININE-BSD FRML MDRD: ABNORMAL ML/MIN/{1.73_M2}
GFR SERPL CREATININE-BSD FRML MDRD: ABNORMAL ML/MIN/{1.73_M2}
GLUCOSE BLD-MCNC: 84 MG/DL (ref 70–99)
HCT VFR BLD CALC: 28.3 % (ref 36–46)
HEMOGLOBIN: 8.8 G/DL (ref 12–16)
LYMPHOCYTES # BLD: 42 %
MCH RBC QN AUTO: 23.3 PG (ref 26–34)
MCHC RBC AUTO-ENTMCNC: 31.2 G/DL (ref 31–37)
MCV RBC AUTO: 74.6 FL (ref 80–100)
MONOCYTES # BLD: 10 %
MORPHOLOGY: ABNORMAL
PDW BLD-RTO: 23.6 % (ref 12.5–15.4)
PLATELET # BLD: 216 K/UL (ref 140–450)
PLATELET ESTIMATE: ABNORMAL
PMV BLD AUTO: 9 FL (ref 6–12)
POTASSIUM SERPL-SCNC: 3.5 MMOL/L (ref 3.7–5.3)
RBC # BLD: 3.8 M/UL (ref 4–5.2)
RBC # BLD: ABNORMAL 10*6/UL
SEG NEUTROPHILS: 46 %
SEGMENTED NEUTROPHILS ABSOLUTE COUNT: 2.95 K/UL (ref 1.8–7.7)
SEND OUT REPORT: NORMAL
SODIUM BLD-SCNC: 143 MMOL/L (ref 135–144)
TEST NAME: NORMAL
WBC # BLD: 6.4 K/UL (ref 3.5–11)
WBC # BLD: ABNORMAL 10*3/UL

## 2017-06-09 PROCEDURE — 99232 SBSQ HOSP IP/OBS MODERATE 35: CPT | Performed by: PSYCHIATRY & NEUROLOGY

## 2017-06-09 PROCEDURE — 6370000000 HC RX 637 (ALT 250 FOR IP): Performed by: HOSPITALIST

## 2017-06-09 PROCEDURE — 86927 PLASMA FRESH FROZEN: CPT

## 2017-06-09 PROCEDURE — 6370000000 HC RX 637 (ALT 250 FOR IP): Performed by: PSYCHIATRY & NEUROLOGY

## 2017-06-09 PROCEDURE — 99231 SBSQ HOSP IP/OBS SF/LOW 25: CPT | Performed by: FAMILY MEDICINE

## 2017-06-09 PROCEDURE — 80048 BASIC METABOLIC PNL TOTAL CA: CPT

## 2017-06-09 PROCEDURE — 76937 US GUIDE VASCULAR ACCESS: CPT

## 2017-06-09 PROCEDURE — 94799 UNLISTED PULMONARY SVC/PX: CPT

## 2017-06-09 PROCEDURE — 36415 COLL VENOUS BLD VENIPUNCTURE: CPT

## 2017-06-09 PROCEDURE — 97530 THERAPEUTIC ACTIVITIES: CPT

## 2017-06-09 PROCEDURE — 6360000002 HC RX W HCPCS: Performed by: FAMILY MEDICINE

## 2017-06-09 PROCEDURE — 97535 SELF CARE MNGMENT TRAINING: CPT

## 2017-06-09 PROCEDURE — P9017 PLASMA 1 DONOR FRZ W/IN 8 HR: HCPCS

## 2017-06-09 PROCEDURE — 99232 SBSQ HOSP IP/OBS MODERATE 35: CPT | Performed by: INTERNAL MEDICINE

## 2017-06-09 PROCEDURE — 2580000003 HC RX 258: Performed by: FAMILY MEDICINE

## 2017-06-09 PROCEDURE — 82330 ASSAY OF CALCIUM: CPT

## 2017-06-09 PROCEDURE — 85384 FIBRINOGEN ACTIVITY: CPT

## 2017-06-09 PROCEDURE — 85025 COMPLETE CBC W/AUTO DIFF WBC: CPT

## 2017-06-09 PROCEDURE — 97112 NEUROMUSCULAR REEDUCATION: CPT

## 2017-06-09 PROCEDURE — 36515 HC APHERESIS THER W/PLASMA REINF: CPT

## 2017-06-09 PROCEDURE — 97035 APP MDLTY 1+ULTRASOUND EA 15: CPT

## 2017-06-09 PROCEDURE — 2060000000 HC ICU INTERMEDIATE R&B

## 2017-06-09 PROCEDURE — 97110 THERAPEUTIC EXERCISES: CPT

## 2017-06-09 RX ORDER — 0.9 % SODIUM CHLORIDE 0.9 %
250 INTRAVENOUS SOLUTION INTRAVENOUS ONCE
Status: DISCONTINUED | OUTPATIENT
Start: 2017-06-09 | End: 2017-06-11 | Stop reason: HOSPADM

## 2017-06-09 RX ORDER — HEPARIN SODIUM 5000 [USP'U]/ML
30000 INJECTION, SOLUTION INTRAVENOUS; SUBCUTANEOUS EVERY OTHER DAY
Status: DISCONTINUED | OUTPATIENT
Start: 2017-06-11 | End: 2017-06-11 | Stop reason: HOSPADM

## 2017-06-09 RX ORDER — HEPARIN SODIUM 5000 [USP'U]/ML
30000 INJECTION, SOLUTION INTRAVENOUS; SUBCUTANEOUS ONCE
Status: COMPLETED | OUTPATIENT
Start: 2017-06-09 | End: 2017-06-09

## 2017-06-09 RX ORDER — ALBUMIN, HUMAN INJ 5% 5 %
5000 SOLUTION INTRAVENOUS EVERY OTHER DAY
Status: DISCONTINUED | OUTPATIENT
Start: 2017-06-11 | End: 2017-06-11 | Stop reason: HOSPADM

## 2017-06-09 RX ADMIN — HEPARIN SODIUM 5000 UNITS: 5000 INJECTION, SOLUTION INTRAVENOUS; SUBCUTANEOUS at 08:15

## 2017-06-09 RX ADMIN — Medication 10 ML: at 08:16

## 2017-06-09 RX ADMIN — PYRIDOSTIGMINE BROMIDE 60 MG: 60 TABLET ORAL at 18:23

## 2017-06-09 RX ADMIN — Medication 10 ML: at 21:19

## 2017-06-09 RX ADMIN — FERROUS SULFATE TAB EC 325 MG (65 MG FE EQUIVALENT) 325 MG: 325 (65 FE) TABLET DELAYED RESPONSE at 08:15

## 2017-06-09 RX ADMIN — HEPARIN SODIUM 5000 UNITS: 5000 INJECTION, SOLUTION INTRAVENOUS; SUBCUTANEOUS at 21:19

## 2017-06-09 RX ADMIN — PREDNISONE 60 MG: 50 TABLET ORAL at 08:15

## 2017-06-09 RX ADMIN — PYRIDOSTIGMINE BROMIDE 60 MG: 60 TABLET ORAL at 08:15

## 2017-06-09 RX ADMIN — HEPARIN SODIUM 30000 UNITS: 5000 INJECTION, SOLUTION INTRAVENOUS; SUBCUTANEOUS at 17:45

## 2017-06-09 ASSESSMENT — PAIN SCALES - GENERAL: PAINLEVEL_OUTOF10: 0

## 2017-06-10 LAB
ABSOLUTE EOS #: 0.14 K/UL (ref 0–0.4)
ABSOLUTE LYMPH #: 3.1 K/UL (ref 1–4.8)
ABSOLUTE MONO #: 0.79 K/UL (ref 0.1–1.2)
ANION GAP SERPL CALCULATED.3IONS-SCNC: 15 MMOL/L (ref 9–17)
BASOPHILS # BLD: 1 %
BASOPHILS ABSOLUTE: 0.07 K/UL (ref 0–0.2)
BLD PROD TYP BPU: NORMAL
BUN BLDV-MCNC: 9 MG/DL (ref 8–23)
BUN/CREAT BLD: ABNORMAL (ref 9–20)
CALCIUM SERPL-MCNC: 9.2 MG/DL (ref 8.6–10.4)
CHLORIDE BLD-SCNC: 101 MMOL/L (ref 98–107)
CO2: 28 MMOL/L (ref 20–31)
CREAT SERPL-MCNC: 0.46 MG/DL (ref 0.5–0.9)
DIFFERENTIAL TYPE: ABNORMAL
DISPENSE STATUS BLOOD BANK: NORMAL
EOSINOPHILS RELATIVE PERCENT: 2 %
GFR AFRICAN AMERICAN: >60 ML/MIN
GFR NON-AFRICAN AMERICAN: >60 ML/MIN
GFR SERPL CREATININE-BSD FRML MDRD: ABNORMAL ML/MIN/{1.73_M2}
GFR SERPL CREATININE-BSD FRML MDRD: ABNORMAL ML/MIN/{1.73_M2}
GLUCOSE BLD-MCNC: 99 MG/DL (ref 70–99)
HCT VFR BLD CALC: 29.2 % (ref 36–46)
HEMOGLOBIN: 8.9 G/DL (ref 12–16)
LYMPHOCYTES # BLD: 43 %
MCH RBC QN AUTO: 23.4 PG (ref 26–34)
MCHC RBC AUTO-ENTMCNC: 30.6 G/DL (ref 31–37)
MCV RBC AUTO: 76.5 FL (ref 80–100)
MONOCYTES # BLD: 11 %
MORPHOLOGY: ABNORMAL
PDW BLD-RTO: 24.8 % (ref 12.5–15.4)
PLATELET # BLD: 228 K/UL (ref 140–450)
PLATELET ESTIMATE: ABNORMAL
PMV BLD AUTO: 9.3 FL (ref 6–12)
POTASSIUM SERPL-SCNC: 3.5 MMOL/L (ref 3.7–5.3)
RBC # BLD: 3.81 M/UL (ref 4–5.2)
RBC # BLD: ABNORMAL 10*6/UL
SEG NEUTROPHILS: 43 %
SEGMENTED NEUTROPHILS ABSOLUTE COUNT: 3.1 K/UL (ref 1.8–7.7)
SODIUM BLD-SCNC: 144 MMOL/L (ref 135–144)
TRANSFUSION STATUS: NORMAL
UNIT DIVISION: 0
UNIT NUMBER: NORMAL
WBC # BLD: 7.2 K/UL (ref 3.5–11)
WBC # BLD: ABNORMAL 10*3/UL

## 2017-06-10 PROCEDURE — 6370000000 HC RX 637 (ALT 250 FOR IP): Performed by: FAMILY MEDICINE

## 2017-06-10 PROCEDURE — 36415 COLL VENOUS BLD VENIPUNCTURE: CPT

## 2017-06-10 PROCEDURE — 85025 COMPLETE CBC W/AUTO DIFF WBC: CPT

## 2017-06-10 PROCEDURE — 2060000000 HC ICU INTERMEDIATE R&B

## 2017-06-10 PROCEDURE — 99231 SBSQ HOSP IP/OBS SF/LOW 25: CPT | Performed by: FAMILY MEDICINE

## 2017-06-10 PROCEDURE — 6370000000 HC RX 637 (ALT 250 FOR IP): Performed by: PSYCHIATRY & NEUROLOGY

## 2017-06-10 PROCEDURE — 99232 SBSQ HOSP IP/OBS MODERATE 35: CPT | Performed by: INTERNAL MEDICINE

## 2017-06-10 PROCEDURE — 6370000000 HC RX 637 (ALT 250 FOR IP): Performed by: HOSPITALIST

## 2017-06-10 PROCEDURE — 80048 BASIC METABOLIC PNL TOTAL CA: CPT

## 2017-06-10 PROCEDURE — 99232 SBSQ HOSP IP/OBS MODERATE 35: CPT | Performed by: PSYCHIATRY & NEUROLOGY

## 2017-06-10 PROCEDURE — 6360000002 HC RX W HCPCS: Performed by: FAMILY MEDICINE

## 2017-06-10 PROCEDURE — 2580000003 HC RX 258: Performed by: FAMILY MEDICINE

## 2017-06-10 PROCEDURE — 94799 UNLISTED PULMONARY SVC/PX: CPT

## 2017-06-10 RX ADMIN — HEPARIN SODIUM 5000 UNITS: 5000 INJECTION, SOLUTION INTRAVENOUS; SUBCUTANEOUS at 06:28

## 2017-06-10 RX ADMIN — Medication 10 ML: at 10:39

## 2017-06-10 RX ADMIN — PREDNISONE 60 MG: 50 TABLET ORAL at 10:40

## 2017-06-10 RX ADMIN — PYRIDOSTIGMINE BROMIDE 60 MG: 60 TABLET ORAL at 22:07

## 2017-06-10 RX ADMIN — PYRIDOSTIGMINE BROMIDE 60 MG: 60 TABLET ORAL at 00:22

## 2017-06-10 RX ADMIN — HEPARIN SODIUM 5000 UNITS: 5000 INJECTION, SOLUTION INTRAVENOUS; SUBCUTANEOUS at 20:54

## 2017-06-10 RX ADMIN — FERROUS SULFATE TAB EC 325 MG (65 MG FE EQUIVALENT) 325 MG: 325 (65 FE) TABLET DELAYED RESPONSE at 10:40

## 2017-06-10 RX ADMIN — Medication 10 ML: at 20:54

## 2017-06-10 RX ADMIN — PYRIDOSTIGMINE BROMIDE 60 MG: 60 TABLET ORAL at 06:28

## 2017-06-10 RX ADMIN — HEPARIN SODIUM 5000 UNITS: 5000 INJECTION, SOLUTION INTRAVENOUS; SUBCUTANEOUS at 15:17

## 2017-06-10 RX ADMIN — POTASSIUM CHLORIDE 40 MEQ: 20 TABLET, EXTENDED RELEASE ORAL at 15:18

## 2017-06-10 RX ADMIN — PYRIDOSTIGMINE BROMIDE 60 MG: 60 TABLET ORAL at 15:19

## 2017-06-11 VITALS
TEMPERATURE: 97.4 F | SYSTOLIC BLOOD PRESSURE: 95 MMHG | WEIGHT: 165 LBS | HEIGHT: 60 IN | DIASTOLIC BLOOD PRESSURE: 51 MMHG | RESPIRATION RATE: 25 BRPM | BODY MASS INDEX: 32.39 KG/M2 | OXYGEN SATURATION: 100 % | HEART RATE: 93 BPM

## 2017-06-11 LAB
ABSOLUTE EOS #: 0 K/UL (ref 0–0.4)
ABSOLUTE LYMPH #: 1.85 K/UL (ref 1–4.8)
ABSOLUTE MONO #: 0.67 K/UL (ref 0.1–0.8)
ACETYLCHOL BLOCK AB: 0 % (ref 0–26)
ACETYLCHOLINE BINDING ANTIBODY: 0.2 NMOL/L (ref 0–0.4)
ANION GAP SERPL CALCULATED.3IONS-SCNC: 11 MMOL/L (ref 9–17)
BASOPHILS # BLD: 0 %
BASOPHILS ABSOLUTE: 0 K/UL (ref 0–0.2)
BUN BLDV-MCNC: 7 MG/DL (ref 8–23)
BUN/CREAT BLD: ABNORMAL (ref 9–20)
CALCIUM IONIZED: 1.28 MMOL/L (ref 1.13–1.33)
CALCIUM SERPL-MCNC: 9.7 MG/DL (ref 8.6–10.4)
CHLORIDE BLD-SCNC: 98 MMOL/L (ref 98–107)
CO2: 30 MMOL/L (ref 20–31)
CREAT SERPL-MCNC: 0.23 MG/DL (ref 0.5–0.9)
DIFFERENTIAL TYPE: ABNORMAL
EOSINOPHILS RELATIVE PERCENT: 0 %
FIBRINOGEN: 187 MG/DL (ref 140–420)
GFR AFRICAN AMERICAN: >60 ML/MIN
GFR NON-AFRICAN AMERICAN: >60 ML/MIN
GFR SERPL CREATININE-BSD FRML MDRD: ABNORMAL ML/MIN/{1.73_M2}
GFR SERPL CREATININE-BSD FRML MDRD: ABNORMAL ML/MIN/{1.73_M2}
GLUCOSE BLD-MCNC: 102 MG/DL (ref 70–99)
HCT VFR BLD CALC: 29 % (ref 36–46)
HEMOGLOBIN: 9 G/DL (ref 12–16)
LYMPHOCYTES # BLD: 25 %
MCH RBC QN AUTO: 23.7 PG (ref 26–34)
MCHC RBC AUTO-ENTMCNC: 31.1 G/DL (ref 31–37)
MCV RBC AUTO: 76 FL (ref 80–100)
MONOCYTES # BLD: 9 %
MORPHOLOGY: ABNORMAL
PDW BLD-RTO: 25.4 % (ref 12.5–15.4)
PLATELET # BLD: 249 K/UL (ref 140–450)
PLATELET ESTIMATE: ABNORMAL
PMV BLD AUTO: 8.8 FL (ref 6–12)
POTASSIUM SERPL-SCNC: 4.3 MMOL/L (ref 3.7–5.3)
RBC # BLD: 3.81 M/UL (ref 4–5.2)
RBC # BLD: ABNORMAL 10*6/UL
SEG NEUTROPHILS: 66 %
SEGMENTED NEUTROPHILS ABSOLUTE COUNT: 4.88 K/UL (ref 1.8–7.7)
SODIUM BLD-SCNC: 139 MMOL/L (ref 135–144)
STRIATED MUSCLE AB, IGG SCREEN: NORMAL
TITIN ANTIBODY: <0.09 IV (ref 0–0.45)
WBC # BLD: 7.4 K/UL (ref 3.5–11)
WBC # BLD: ABNORMAL 10*3/UL

## 2017-06-11 PROCEDURE — 6370000000 HC RX 637 (ALT 250 FOR IP): Performed by: HOSPITALIST

## 2017-06-11 PROCEDURE — 94150 VITAL CAPACITY TEST: CPT

## 2017-06-11 PROCEDURE — 99238 HOSP IP/OBS DSCHRG MGMT 30/<: CPT | Performed by: FAMILY MEDICINE

## 2017-06-11 PROCEDURE — 85025 COMPLETE CBC W/AUTO DIFF WBC: CPT

## 2017-06-11 PROCEDURE — 80048 BASIC METABOLIC PNL TOTAL CA: CPT

## 2017-06-11 PROCEDURE — 36415 COLL VENOUS BLD VENIPUNCTURE: CPT

## 2017-06-11 PROCEDURE — 99232 SBSQ HOSP IP/OBS MODERATE 35: CPT | Performed by: NURSE PRACTITIONER

## 2017-06-11 PROCEDURE — 6360000002 HC RX W HCPCS: Performed by: FAMILY MEDICINE

## 2017-06-11 PROCEDURE — 99232 SBSQ HOSP IP/OBS MODERATE 35: CPT | Performed by: INTERNAL MEDICINE

## 2017-06-11 PROCEDURE — 3910000000 HC WEEKEND / HOLIDAY ARC SERVICE

## 2017-06-11 PROCEDURE — 85384 FIBRINOGEN ACTIVITY: CPT

## 2017-06-11 PROCEDURE — 6370000000 HC RX 637 (ALT 250 FOR IP): Performed by: PSYCHIATRY & NEUROLOGY

## 2017-06-11 PROCEDURE — 36515 HC APHERESIS THER W/PLASMA REINF: CPT

## 2017-06-11 PROCEDURE — 82330 ASSAY OF CALCIUM: CPT

## 2017-06-11 RX ORDER — PREDNISONE 10 MG/1
10 TABLET ORAL DAILY
Qty: 10 TABLET | Refills: 0 | Status: SHIPPED | OUTPATIENT
Start: 2017-08-15 | End: 2017-08-25

## 2017-06-11 RX ORDER — PYRIDOSTIGMINE BROMIDE 60 MG/1
60 TABLET ORAL EVERY 8 HOURS SCHEDULED
Qty: 60 TABLET | Refills: 3 | Status: SHIPPED | OUTPATIENT
Start: 2017-06-11 | End: 2017-11-07 | Stop reason: DRUGHIGH

## 2017-06-11 RX ORDER — PREDNISONE 20 MG/1
60 TABLET ORAL DAILY
Qty: 30 TABLET | Refills: 0 | Status: SHIPPED | OUTPATIENT
Start: 2017-06-11 | End: 2017-06-21

## 2017-06-11 RX ORDER — PREDNISONE 10 MG/1
30 TABLET ORAL DAILY
Qty: 30 TABLET | Refills: 0 | Status: SHIPPED | OUTPATIENT
Start: 2017-07-18 | End: 2017-07-28

## 2017-06-11 RX ORDER — PREDNISONE 50 MG/1
50 TABLET ORAL DAILY
Qty: 10 TABLET | Refills: 0 | Status: SHIPPED | OUTPATIENT
Start: 2017-06-20 | End: 2017-06-30

## 2017-06-11 RX ORDER — PREDNISONE 20 MG/1
40 TABLET ORAL DAILY
Qty: 20 TABLET | Refills: 0 | Status: SHIPPED | OUTPATIENT
Start: 2017-07-04 | End: 2017-07-14

## 2017-06-11 RX ORDER — PREDNISONE 20 MG/1
20 TABLET ORAL DAILY
Qty: 10 TABLET | Refills: 0 | Status: SHIPPED | OUTPATIENT
Start: 2017-08-01 | End: 2017-08-11

## 2017-06-11 RX ADMIN — FERROUS SULFATE TAB EC 325 MG (65 MG FE EQUIVALENT) 325 MG: 325 (65 FE) TABLET DELAYED RESPONSE at 10:23

## 2017-06-11 RX ADMIN — PREDNISONE 60 MG: 50 TABLET ORAL at 10:24

## 2017-06-11 RX ADMIN — PYRIDOSTIGMINE BROMIDE 60 MG: 60 TABLET ORAL at 10:23

## 2017-06-11 RX ADMIN — MORPHINE SULFATE 2 MG: 2 INJECTION, SOLUTION INTRAMUSCULAR; INTRAVENOUS at 05:15

## 2017-06-11 RX ADMIN — HEPARIN SODIUM 5000 UNITS: 5000 INJECTION, SOLUTION INTRAVENOUS; SUBCUTANEOUS at 06:05

## 2017-06-11 ASSESSMENT — PAIN SCALES - GENERAL: PAINLEVEL_OUTOF10: 8

## 2017-06-15 ENCOUNTER — OFFICE VISIT (OUTPATIENT)
Dept: FAMILY MEDICINE CLINIC | Age: 77
End: 2017-06-15
Payer: COMMERCIAL

## 2017-06-15 VITALS — TEMPERATURE: 99.7 F | DIASTOLIC BLOOD PRESSURE: 73 MMHG | SYSTOLIC BLOOD PRESSURE: 115 MMHG | HEART RATE: 94 BPM

## 2017-06-15 DIAGNOSIS — G70.00 MYASTHENIA (HCC): Primary | ICD-10-CM

## 2017-06-15 DIAGNOSIS — J43.2 CENTRILOBULAR EMPHYSEMA (HCC): ICD-10-CM

## 2017-06-15 DIAGNOSIS — Z13.820 SCREENING FOR OSTEOPOROSIS: ICD-10-CM

## 2017-06-15 PROCEDURE — 99213 OFFICE O/P EST LOW 20 MIN: CPT | Performed by: FAMILY MEDICINE

## 2017-06-15 ASSESSMENT — ENCOUNTER SYMPTOMS
EYE PAIN: 0
EYE REDNESS: 0
SHORTNESS OF BREATH: 0
ABDOMINAL PAIN: 0
ABDOMINAL DISTENTION: 0
DIARRHEA: 0
EYE DISCHARGE: 0
PHOTOPHOBIA: 0
EYE ITCHING: 0
CONSTIPATION: 0
BLOOD IN STOOL: 0

## 2017-07-31 ENCOUNTER — HOSPITAL ENCOUNTER (OUTPATIENT)
Dept: MAMMOGRAPHY | Age: 77
Discharge: HOME | End: 2017-07-31
Payer: COMMERCIAL

## 2017-07-31 DIAGNOSIS — Z13.820 SCREENING FOR OSTEOPOROSIS: ICD-10-CM

## 2017-07-31 PROCEDURE — 77080 DXA BONE DENSITY AXIAL: CPT

## 2017-08-01 ENCOUNTER — OFFICE VISIT (OUTPATIENT)
Dept: NEUROLOGY | Age: 77
End: 2017-08-01
Payer: COMMERCIAL

## 2017-08-01 VITALS — DIASTOLIC BLOOD PRESSURE: 56 MMHG | SYSTOLIC BLOOD PRESSURE: 82 MMHG | RESPIRATION RATE: 16 BRPM | HEART RATE: 97 BPM

## 2017-08-01 DIAGNOSIS — Z74.09 IMMOBILITY: ICD-10-CM

## 2017-08-01 DIAGNOSIS — G70.00 MYASTHENIA (HCC): Primary | ICD-10-CM

## 2017-08-01 DIAGNOSIS — G82.20 LOWER PARAPLEGIA (HCC): ICD-10-CM

## 2017-08-01 PROCEDURE — 99215 OFFICE O/P EST HI 40 MIN: CPT | Performed by: PSYCHIATRY & NEUROLOGY

## 2017-08-01 RX ORDER — PYRIDOSTIGMINE BROMIDE 60 MG/1
60 TABLET ORAL 4 TIMES DAILY
Qty: 120 TABLET | Refills: 3 | Status: SHIPPED | OUTPATIENT
Start: 2017-08-01 | End: 2017-11-07 | Stop reason: SDUPTHER

## 2017-08-18 ENCOUNTER — OFFICE VISIT (OUTPATIENT)
Dept: FAMILY MEDICINE CLINIC | Age: 77
End: 2017-08-18
Payer: COMMERCIAL

## 2017-08-18 VITALS
HEIGHT: 60 IN | TEMPERATURE: 98.4 F | BODY MASS INDEX: 32.38 KG/M2 | HEART RATE: 91 BPM | WEIGHT: 164.9 LBS | SYSTOLIC BLOOD PRESSURE: 94 MMHG | DIASTOLIC BLOOD PRESSURE: 65 MMHG

## 2017-08-18 DIAGNOSIS — G70.00 MYASTHENIA (HCC): Primary | ICD-10-CM

## 2017-08-18 DIAGNOSIS — Z76.0 MEDICATION REFILL: ICD-10-CM

## 2017-08-18 DIAGNOSIS — I10 ESSENTIAL HYPERTENSION: ICD-10-CM

## 2017-08-18 PROCEDURE — 99213 OFFICE O/P EST LOW 20 MIN: CPT | Performed by: HOSPITALIST

## 2017-08-18 RX ORDER — DIAPER,BRIEF,ADULT, DISPOSABLE
EACH MISCELLANEOUS
Qty: 2 BOTTLE | Refills: 3 | Status: CANCELLED | OUTPATIENT
Start: 2017-08-18

## 2017-08-18 RX ORDER — ETOH/EUC OIL/MENTH/PEP/WINTERG
SPRAY, NON-AEROSOL (ML) MUCOUS MEMBRANE
Qty: 20 BOTTLE | Refills: 3 | Status: SHIPPED | OUTPATIENT
Start: 2017-08-18 | End: 2018-01-01 | Stop reason: ALTCHOICE

## 2017-08-18 ASSESSMENT — ENCOUNTER SYMPTOMS
WHEEZING: 0
COUGH: 0
SHORTNESS OF BREATH: 0

## 2017-09-01 DIAGNOSIS — Z76.0 MEDICATION REFILL: ICD-10-CM

## 2017-09-07 RX ORDER — SIMVASTATIN 10 MG
TABLET ORAL
Qty: 90 TABLET | Refills: 3 | Status: SHIPPED | OUTPATIENT
Start: 2017-09-07 | End: 2018-03-23 | Stop reason: SDUPTHER

## 2017-09-07 RX ORDER — LISINOPRIL 40 MG/1
TABLET ORAL
Qty: 90 TABLET | Refills: 1 | Status: SHIPPED | OUTPATIENT
Start: 2017-09-07 | End: 2018-01-25 | Stop reason: SDUPTHER

## 2017-09-07 RX ORDER — ACETAMINOPHEN 500 MG
TABLET ORAL
Qty: 120 TABLET | Refills: 1 | Status: SHIPPED | OUTPATIENT
Start: 2017-09-07 | End: 2018-03-05 | Stop reason: SDUPTHER

## 2017-09-08 ENCOUNTER — OFFICE VISIT (OUTPATIENT)
Dept: FAMILY MEDICINE CLINIC | Age: 77
End: 2017-09-08
Payer: COMMERCIAL

## 2017-09-08 VITALS
DIASTOLIC BLOOD PRESSURE: 57 MMHG | TEMPERATURE: 97.9 F | WEIGHT: 164.9 LBS | BODY MASS INDEX: 32.38 KG/M2 | SYSTOLIC BLOOD PRESSURE: 91 MMHG | HEIGHT: 60 IN | HEART RATE: 85 BPM

## 2017-09-08 DIAGNOSIS — M79.89 LEFT LEG SWELLING: Primary | ICD-10-CM

## 2017-09-08 DIAGNOSIS — G70.00 MYASTHENIA (HCC): ICD-10-CM

## 2017-09-08 PROCEDURE — 99213 OFFICE O/P EST LOW 20 MIN: CPT | Performed by: HOSPITALIST

## 2017-09-08 RX ORDER — COVID-19 ANTIGEN TEST
KIT MISCELLANEOUS
Qty: 60 CAPSULE | Status: CANCELLED | OUTPATIENT
Start: 2017-09-08

## 2017-09-08 RX ORDER — NAPROXEN 500 MG/1
500 TABLET ORAL 2 TIMES DAILY WITH MEALS
Qty: 28 TABLET | Refills: 0 | Status: SHIPPED | OUTPATIENT
Start: 2017-09-08 | End: 2018-01-01 | Stop reason: ALTCHOICE

## 2017-09-08 ASSESSMENT — ENCOUNTER SYMPTOMS
WHEEZING: 0
SHORTNESS OF BREATH: 0
COUGH: 0

## 2017-11-01 ENCOUNTER — TELEPHONE (OUTPATIENT)
Dept: NEUROLOGY | Age: 77
End: 2017-11-01

## 2017-11-01 DIAGNOSIS — G70.00 MYASTHENIA (HCC): Primary | ICD-10-CM

## 2017-11-01 RX ORDER — PREDNISONE 20 MG/1
TABLET ORAL
Qty: 25 TABLET | Refills: 0 | Status: SHIPPED | OUTPATIENT
Start: 2017-11-01 | End: 2017-11-07 | Stop reason: SDUPTHER

## 2017-11-01 NOTE — TELEPHONE ENCOUNTER
I called Earl Soria and gave her this message. I reviewed the directions for the steroids and advised her to make sure to take with food. She will call use if she isn't better in a few days.

## 2017-11-07 ENCOUNTER — OFFICE VISIT (OUTPATIENT)
Dept: NEUROLOGY | Age: 77
End: 2017-11-07
Payer: COMMERCIAL

## 2017-11-07 VITALS — DIASTOLIC BLOOD PRESSURE: 69 MMHG | HEIGHT: 61 IN | SYSTOLIC BLOOD PRESSURE: 101 MMHG | HEART RATE: 120 BPM

## 2017-11-07 DIAGNOSIS — G82.20 LOWER PARAPLEGIA (HCC): ICD-10-CM

## 2017-11-07 DIAGNOSIS — R13.19 OTHER DYSPHAGIA: ICD-10-CM

## 2017-11-07 DIAGNOSIS — G70.00 MYASTHENIA (HCC): Primary | ICD-10-CM

## 2017-11-07 DIAGNOSIS — R47.81 SLURRED SPEECH: ICD-10-CM

## 2017-11-07 DIAGNOSIS — Z74.09 IMMOBILITY: ICD-10-CM

## 2017-11-07 PROCEDURE — G8484 FLU IMMUNIZE NO ADMIN: HCPCS | Performed by: PSYCHIATRY & NEUROLOGY

## 2017-11-07 PROCEDURE — G8427 DOCREV CUR MEDS BY ELIG CLIN: HCPCS | Performed by: PSYCHIATRY & NEUROLOGY

## 2017-11-07 PROCEDURE — 1090F PRES/ABSN URINE INCON ASSESS: CPT | Performed by: PSYCHIATRY & NEUROLOGY

## 2017-11-07 PROCEDURE — G8399 PT W/DXA RESULTS DOCUMENT: HCPCS | Performed by: PSYCHIATRY & NEUROLOGY

## 2017-11-07 PROCEDURE — G8417 CALC BMI ABV UP PARAM F/U: HCPCS | Performed by: PSYCHIATRY & NEUROLOGY

## 2017-11-07 PROCEDURE — 4040F PNEUMOC VAC/ADMIN/RCVD: CPT | Performed by: PSYCHIATRY & NEUROLOGY

## 2017-11-07 PROCEDURE — 99214 OFFICE O/P EST MOD 30 MIN: CPT | Performed by: PSYCHIATRY & NEUROLOGY

## 2017-11-07 PROCEDURE — 1036F TOBACCO NON-USER: CPT | Performed by: PSYCHIATRY & NEUROLOGY

## 2017-11-07 PROCEDURE — 1123F ACP DISCUSS/DSCN MKR DOCD: CPT | Performed by: PSYCHIATRY & NEUROLOGY

## 2017-11-07 RX ORDER — PYRIDOSTIGMINE BROMIDE 60 MG/1
60 TABLET ORAL 4 TIMES DAILY
Qty: 120 TABLET | Refills: 3 | Status: SHIPPED | OUTPATIENT
Start: 2017-11-07 | End: 2018-03-08 | Stop reason: SDUPTHER

## 2017-11-07 RX ORDER — PREDNISONE 20 MG/1
TABLET ORAL
Qty: 42 TABLET | Refills: 0 | Status: SHIPPED | OUTPATIENT
Start: 2017-11-07 | End: 2017-11-17

## 2017-11-07 NOTE — PROGRESS NOTES
range of motion at the at right shoulder. REVIEW OF SYSTEMS  Constitutional Weight changes: present,Appetite:present Fevers : absent, Fatigue: absent; Any recent hospitalizations:  present.    HEENT Ears: normal, Eyes: glasses, Visual disturbance: absent   Reespiratory Shortness of breath: absent, Cough: absent   Cardivascular Chest pain: absent, Leg swelling :absent   GI Constipation: absent, Diarrhea: absent, Swallowing change: absent    Urinary frequency: absent, Urinary urgency: absent, Urinary incontinence: absent   Musculoskeletal Neck pain: present, Back pain: present, Stiffness: present, Muscle pain: present, Joint pain: present   Dermatological Hair loss: present, Skin changes: absent   Neurological Memory loss: absent, Confusion: absent, Seizures: absent; Trouble walking or imbalance: present,  Dizziness: absent, Weakness: present, Numbness absent, Tremor: absent, Spasm: absent, Speech difficulty: absent, Headache: absent, Light sensitivity: absent   Psychiatric Anxiety: absent, Depression  absent, Suicidal ideations absent   Hematologic Abnormal bleeding: absent, Anemia: absent, Clotting disorder: absent, Lymph gland changes: absent     Current Outpatient Prescriptions on File Prior to Visit   Medication Sig Dispense Refill    predniSONE (DELTASONE) 20 MG tablet WK 1 : 2 TAB IN AM; WK 2 : 1 TAB IN AM., WK 3 : half TAB IN AM and stop 25 tablet 0    Elastic Bandages & Supports (JOBST KNEE HIGH COMPRESSION SM) MISC 2 each by Does not apply route daily as needed (swelling) 2 each 0    naproxen (NAPROSYN) 500 MG tablet Take 1 tablet by mouth 2 times daily (with meals) for 14 days 28 tablet 0    lisinopril (PRINIVIL;ZESTRIL) 40 MG tablet take 1 tablet by mouth once daily 90 tablet 1    simvastatin (ZOCOR) 10 MG tablet take 1 tablet by mouth every evening 90 tablet 3    RA ACETAMINOPHEN EX  MG tablet take 2 tablets by mouth every 6 hours if needed for pain 120 tablet 1    Incontinence Supply SURGERY      LUMBAR SPINE SURGERY       Social History: Sunny Pool  reports that she quit smoking about 32 years ago. She quit after 30.00 years of use. She has never used smokeless tobacco. She reports that she does not drink alcohol or use drugs. Family History   Problem Relation Age of Onset    Heart Attack Mother     High Blood Pressure Other        On exam: Blood pressure 101/69, pulse 120, height 5' 1\" (1.549 m). GENERAL  Appears comfortable and in no distress   HEENT  NC/ AT   NECK  Supple and no bruits heard   MENTAL STATUS:  Alert, oriented, intact memory, no confusion, normal speech, normal language, no hallucination or delusion   CRANIAL NERVES: II     -      Visual fields intact to confrontation  III,IV,VI -  EOMs full, no afferent defect, no JACI, right eye ptosis  V     -     Normal facial sensation  VII    -     Normal facial symmetry  VIII   -     Intact hearing  IX,X -     Symmetrical palate  XI    -     Symmetrical shoulder shrug  XII   -     Midline tongue, no atrophy    MOTOR FUNCTION:  significant for significant weakness of grade 0/5 in bilateral lower extremities and 4/5 in Rt UE and 5/5 Lt UE with normal tone, atrophy in distal muscles of lower extremities with no involuntary movements, no tremor   SENSORY FUNCTION:  Normal touch, normal pin, normal vibration, normal proprioception   CEREBELLAR FUNCTION:  Intact fine motor control over upper limbs   REFLEX FUNCTION:  Symmetric, no perverted reflex, no Babinski sign   STATION and GAIT  wheel chair dependent             Diagnostic data reviewed with the patient:   MRI Brain (6/1/2017):  1. No evidence of acute infarct. 2. There is central spinal canal narrowing in the cervical spine, at the   level of the C1 arch, similar to the previous CT on 05/18/2017, and MRI on   08/31/2007. 3. Minimal chronic microvascular white matter ischemic disease, slightly   increased from 09/05/2007.        MRA Head (6/1/2017): unremarkable MRA exam of the head. CT Chest (6/2/17):   1. High-resolution CT of the chest is not the proper protocol to evaluate for   lung nodules ; rather, it is used to diagnosis interstitial lung disease.  No   such finding is evident.  Subpleural pattern of pulmonary emphysema noted. 2. Regarding the calcified nodule seen on x-ray, this is demonstrated in the   left upper lobe probably on the basis of prior granulomatous disease. CBC:     Lab Results   Component Value Date    WBC 7.4 06/11/2017    HGB 9.0 (L) 06/11/2017     06/11/2017      BMP:     Lab Results   Component Value Date     06/11/2017    K 4.3 06/11/2017    GLUCOSE 102 (H) 06/11/2017    CALCIUM 9.7 06/11/2017       No results found for: PHENYTOIN, PHENOBARB, VALPROATE, CBMZ    Lab Results   Component Value Date    CHOL 140 06/26/2015    LDLCHOLESTEROL 68 06/26/2015    HDL 62 06/26/2015    TRIG 50 06/26/2015    ALT 8 06/02/2017    AST 12 06/02/2017    TSH 1.49 06/02/2017    LABA1C 5.7 05/02/2016    LABMICR 21 04/29/2016     Myasthenia Gravis Panel (6/2/17): Acetylcholine blocking antibody: 0  Acetyl choline Binding antibody :0.2  Striated muscle antibody  <1:40            Impression and Plan: Ms. Betty Wheatley is a 76 y.o. female with   Possible Myasthenic crisis: worsening myasthenic symptoms with right> left eye ptosis and dysphagia and fatigable chewing and slurred speech and proximal muscle weakness  Newly diagnosed Myasthenia Gravis, seronegative; Will admit to [address] [place] [address] for plasmapheresis and get speech therapy eval.   Patient does not want to be admitted to hospital; at her request, steroids script is being given and if symptoms would not improve; she is agreeable to be evaluated in ED immediately. During the interim; will keep her on steroids and cont Pyridostigmine 60 mg qid;      Chronic lower paraplegia; s/p lumbar spine surgery ×2; wheelchair dependent for 78 years  Comorbid conditions include hypertension, hyperlipidemia, PVD,

## 2017-11-09 ENCOUNTER — HOSPITAL ENCOUNTER (EMERGENCY)
Age: 77
Discharge: HOME | End: 2017-11-09
Attending: EMERGENCY MEDICINE
Payer: COMMERCIAL

## 2017-11-09 ENCOUNTER — APPOINTMENT (OUTPATIENT)
Dept: GENERAL RADIOLOGY | Age: 77
End: 2017-11-09
Payer: COMMERCIAL

## 2017-11-09 VITALS
TEMPERATURE: 97 F | HEART RATE: 98 BPM | OXYGEN SATURATION: 97 % | RESPIRATION RATE: 15 BRPM | SYSTOLIC BLOOD PRESSURE: 122 MMHG | DIASTOLIC BLOOD PRESSURE: 75 MMHG | HEIGHT: 61 IN

## 2017-11-09 DIAGNOSIS — G70.01 MYASTHENIA GRAVIS WITH EXACERBATION (HCC): Primary | ICD-10-CM

## 2017-11-09 LAB
-: ABNORMAL
ABSOLUTE EOS #: <0.03 K/UL (ref 0–0.44)
ABSOLUTE IMMATURE GRANULOCYTE: <0.03 K/UL (ref 0–0.3)
ABSOLUTE LYMPH #: 1.44 K/UL (ref 1.1–3.7)
ABSOLUTE MONO #: 0.11 K/UL (ref 0.1–1.2)
ALBUMIN SERPL-MCNC: 3.9 G/DL (ref 3.5–5.2)
ALBUMIN/GLOBULIN RATIO: 1.2 (ref 1–2.5)
ALLEN TEST: ABNORMAL
ALP BLD-CCNC: 54 U/L (ref 35–104)
ALT SERPL-CCNC: 8 U/L (ref 5–33)
AMORPHOUS: ABNORMAL
ANION GAP SERPL CALCULATED.3IONS-SCNC: 12 MMOL/L (ref 9–17)
ANION GAP: 4 MMOL/L (ref 7–16)
AST SERPL-CCNC: 16 U/L
BACTERIA: ABNORMAL
BASOPHILS # BLD: 1 % (ref 0–2)
BASOPHILS ABSOLUTE: 0.03 K/UL (ref 0–0.2)
BILIRUB SERPL-MCNC: 0.25 MG/DL (ref 0.3–1.2)
BILIRUBIN DIRECT: <0.08 MG/DL
BILIRUBIN URINE: NEGATIVE
BILIRUBIN, INDIRECT: ABNORMAL MG/DL (ref 0–1)
BUN BLDV-MCNC: 10 MG/DL (ref 8–23)
BUN/CREAT BLD: ABNORMAL (ref 9–20)
CALCIUM SERPL-MCNC: 9.5 MG/DL (ref 8.6–10.4)
CASTS UA: ABNORMAL /LPF (ref 0–2)
CHLORIDE BLD-SCNC: 100 MMOL/L (ref 98–107)
CO2: 28 MMOL/L (ref 20–31)
COLOR: YELLOW
COMMENT UA: ABNORMAL
CREAT SERPL-MCNC: 0.32 MG/DL (ref 0.5–0.9)
CRYSTALS, UA: ABNORMAL /HPF
DIFFERENTIAL TYPE: ABNORMAL
EKG ATRIAL RATE: 86 BPM
EKG P AXIS: 56 DEGREES
EKG P-R INTERVAL: 124 MS
EKG Q-T INTERVAL: 330 MS
EKG QRS DURATION: 72 MS
EKG QTC CALCULATION (BAZETT): 394 MS
EKG R AXIS: 40 DEGREES
EKG T AXIS: 61 DEGREES
EKG VENTRICULAR RATE: 86 BPM
EOSINOPHILS RELATIVE PERCENT: 0 % (ref 1–4)
EPITHELIAL CELLS UA: ABNORMAL /HPF (ref 0–5)
FIO2: ABNORMAL
GFR AFRICAN AMERICAN: >60 ML/MIN
GFR NON-AFRICAN AMERICAN: >60 ML/MIN
GFR NON-AFRICAN AMERICAN: >60 ML/MIN
GFR SERPL CREATININE-BSD FRML MDRD: >60 ML/MIN
GFR SERPL CREATININE-BSD FRML MDRD: ABNORMAL ML/MIN/{1.73_M2}
GFR SERPL CREATININE-BSD FRML MDRD: ABNORMAL ML/MIN/{1.73_M2}
GFR SERPL CREATININE-BSD FRML MDRD: NORMAL ML/MIN/{1.73_M2}
GLOBULIN: ABNORMAL G/DL (ref 1.5–3.8)
GLUCOSE BLD-MCNC: 127 MG/DL (ref 70–99)
GLUCOSE BLD-MCNC: 134 MG/DL (ref 74–100)
GLUCOSE URINE: ABNORMAL
HCO3 VENOUS: 31 MMOL/L (ref 22–29)
HCT VFR BLD CALC: 40.4 % (ref 36.3–47.1)
HEMOGLOBIN: 11.8 G/DL (ref 11.9–15.1)
IMMATURE GRANULOCYTES: 0 %
INR BLD: 1.1
KETONES, URINE: ABNORMAL
LEUKOCYTE ESTERASE, URINE: NEGATIVE
LYMPHOCYTES # BLD: 24 % (ref 24–43)
MCH RBC QN AUTO: 25.2 PG (ref 25.2–33.5)
MCHC RBC AUTO-ENTMCNC: 29.2 G/DL (ref 28.4–34.8)
MCV RBC AUTO: 86.3 FL (ref 82.6–102.9)
MODE: ABNORMAL
MONOCYTES # BLD: 2 % (ref 3–12)
MUCUS: ABNORMAL
NEGATIVE BASE EXCESS, VEN: ABNORMAL (ref 0–2)
NITRITE, URINE: NEGATIVE
O2 DEVICE/FLOW/%: ABNORMAL
O2 SAT, VEN: 72 % (ref 60–85)
OTHER OBSERVATIONS UA: ABNORMAL
PATIENT TEMP: ABNORMAL
PCO2, VEN: 46.7 MM HG (ref 41–51)
PDW BLD-RTO: 15.9 % (ref 11.8–14.4)
PH UA: 7 (ref 5–8)
PH VENOUS: 7.43 (ref 7.32–7.43)
PLATELET # BLD: ABNORMAL K/UL (ref 138–453)
PLATELET ESTIMATE: ABNORMAL
PLATELET, FLUORESCENCE: 199 K/UL (ref 138–453)
PLATELET, IMMATURE FRACTION: 3.5 % (ref 1.1–10.3)
PMV BLD AUTO: ABNORMAL FL (ref 8.1–13.5)
PO2, VEN: 37.5 MM HG (ref 30–50)
POC CHLORIDE: 105 MMOL/L (ref 98–107)
POC CREATININE: 0.54 MG/DL (ref 0.51–1.19)
POC HEMATOCRIT: 41 % (ref 36–46)
POC HEMOGLOBIN: 13.9 G/DL (ref 12–16)
POC IONIZED CALCIUM: 1.17 MMOL/L (ref 1.15–1.33)
POC LACTIC ACID: 1.27 MMOL/L (ref 0.56–1.39)
POC PCO2 TEMP: ABNORMAL MM HG
POC PH TEMP: ABNORMAL
POC PO2 TEMP: ABNORMAL MM HG
POC POTASSIUM: 4.2 MMOL/L (ref 3.5–4.5)
POC SODIUM: 140 MMOL/L (ref 138–146)
POC TROPONIN I: 0 NG/ML (ref 0–0.1)
POC TROPONIN I: 0 NG/ML (ref 0–0.1)
POC TROPONIN INTERP: NORMAL
POC TROPONIN INTERP: NORMAL
POSITIVE BASE EXCESS, VEN: 6 (ref 0–3)
POTASSIUM SERPL-SCNC: 4.4 MMOL/L (ref 3.7–5.3)
PROTEIN UA: NEGATIVE
PROTHROMBIN TIME: 11.5 SEC (ref 9.4–12.6)
RBC # BLD: 4.68 M/UL (ref 3.95–5.11)
RBC # BLD: ABNORMAL 10*6/UL
RBC UA: ABNORMAL /HPF (ref 0–2)
RENAL EPITHELIAL, UA: ABNORMAL /HPF
SAMPLE SITE: ABNORMAL
SEG NEUTROPHILS: 73 % (ref 36–65)
SEGMENTED NEUTROPHILS ABSOLUTE COUNT: 4.31 K/UL (ref 1.5–8.1)
SODIUM BLD-SCNC: 140 MMOL/L (ref 135–144)
SPECIFIC GRAVITY UA: 1.01 (ref 1–1.03)
TOTAL CO2, VENOUS: 32 MMOL/L (ref 23–30)
TOTAL PROTEIN: 7.2 G/DL (ref 6.4–8.3)
TRICHOMONAS: ABNORMAL
TURBIDITY: ABNORMAL
URINE HGB: ABNORMAL
UROBILINOGEN, URINE: NORMAL
WBC # BLD: 5.9 K/UL (ref 3.5–11.3)
WBC # BLD: ABNORMAL 10*3/UL
WBC UA: ABNORMAL /HPF (ref 0–5)
YEAST: ABNORMAL

## 2017-11-09 PROCEDURE — 99223 1ST HOSP IP/OBS HIGH 75: CPT | Performed by: PSYCHIATRY & NEUROLOGY

## 2017-11-09 PROCEDURE — 83605 ASSAY OF LACTIC ACID: CPT

## 2017-11-09 PROCEDURE — 87086 URINE CULTURE/COLONY COUNT: CPT

## 2017-11-09 PROCEDURE — 99283 EMERGENCY DEPT VISIT LOW MDM: CPT

## 2017-11-09 PROCEDURE — 82565 ASSAY OF CREATININE: CPT

## 2017-11-09 PROCEDURE — 80076 HEPATIC FUNCTION PANEL: CPT

## 2017-11-09 PROCEDURE — 85014 HEMATOCRIT: CPT

## 2017-11-09 PROCEDURE — 85610 PROTHROMBIN TIME: CPT

## 2017-11-09 PROCEDURE — 82330 ASSAY OF CALCIUM: CPT

## 2017-11-09 PROCEDURE — 84484 ASSAY OF TROPONIN QUANT: CPT

## 2017-11-09 PROCEDURE — 82803 BLOOD GASES ANY COMBINATION: CPT

## 2017-11-09 PROCEDURE — 82947 ASSAY GLUCOSE BLOOD QUANT: CPT

## 2017-11-09 PROCEDURE — 80048 BASIC METABOLIC PNL TOTAL CA: CPT

## 2017-11-09 PROCEDURE — 81001 URINALYSIS AUTO W/SCOPE: CPT

## 2017-11-09 PROCEDURE — 94799 UNLISTED PULMONARY SVC/PX: CPT

## 2017-11-09 PROCEDURE — 84295 ASSAY OF SERUM SODIUM: CPT

## 2017-11-09 PROCEDURE — 85025 COMPLETE CBC W/AUTO DIFF WBC: CPT

## 2017-11-09 PROCEDURE — 84132 ASSAY OF SERUM POTASSIUM: CPT

## 2017-11-09 PROCEDURE — 93005 ELECTROCARDIOGRAM TRACING: CPT

## 2017-11-09 PROCEDURE — 71010 XR CHEST PORTABLE: CPT

## 2017-11-09 PROCEDURE — 82435 ASSAY OF BLOOD CHLORIDE: CPT

## 2017-11-09 ASSESSMENT — ENCOUNTER SYMPTOMS
TROUBLE SWALLOWING: 1
VOMITING: 0
NAUSEA: 0
SHORTNESS OF BREATH: 0
ABDOMINAL PAIN: 0

## 2017-11-09 ASSESSMENT — PAIN DESCRIPTION - FREQUENCY: FREQUENCY: CONTINUOUS

## 2017-11-09 ASSESSMENT — PAIN DESCRIPTION - LOCATION: LOCATION: GENERALIZED

## 2017-11-09 ASSESSMENT — PAIN DESCRIPTION - DESCRIPTORS: DESCRIPTORS: ACHING

## 2017-11-09 ASSESSMENT — PAIN DESCRIPTION - PAIN TYPE: TYPE: ACUTE PAIN

## 2017-11-09 ASSESSMENT — PAIN SCALES - GENERAL: PAINLEVEL_OUTOF10: 10

## 2017-11-09 NOTE — ED PROVIDER NOTES
[facility] [facility]  Emergency Department  Faculty Attestation     I performed a history and physical examination of the patient and discussed management with the resident. I reviewed the residents note and agree with the documented findings and plan of care. Any areas of disagreement are noted on the chart. I was personally present for the key portions of any procedures. I have documented in the chart those procedures where I was not present during the key portions. I have reviewed the emergency nurses triage note. I agree with the chief complaint, past medical history, past surgical history, allergies, medications, social and family history as documented unless otherwise noted below. For Physician Assistant/ Nurse Practitioner cases/documentation I have personally evaluated this patient and have completed at least one if not all key elements of the E/M (history, physical exam, and MDM). Additional findings are as noted. Primary Care Physician:  Germain Abebe MD    Screenings:  [unfilled]    CHIEF COMPLAINT       Chief Complaint   Patient presents with    Fatigue       RECENT VITALS:   Temp: 97 °F (36.1 °C),  Pulse: 98, Resp: 15, BP: 122/75    LABS:  Labs Reviewed   URINE CULTURE   BASIC METABOLIC PANEL   CBC WITH AUTO DIFFERENTIAL   HEPATIC FUNCTION PANEL   URINALYSIS   PROTIME-INR   POCT TROPONIN   POC BLOOD GAS AND CHEMISTRY   POC BLOOD GAS AND CHEMISTRY       Radiology  XR Chest Portable    (Results Pending)     EKG Interpretation    Interpreted by me    Rhythm: normal sinus   Rate: normal  Axis: normal  Ectopy: none  Conduction: normal  ST Segments: no acute change  T Waves: no acute change  Q Waves: none    Clinical Impression: no acute changes and normal EKG    Attending Physician Additional  Notes    Patient has known myasthenia gravis and is on Mestinon for this. She is previously received IV plasmapheresis. She was recommended plasmapheresis but declined.   She's been having worsening of her weakness with bilateral ptosis, weakness of upper and lower extremities, in a wheelchair. Recent cough or fever. No infectious symptoms. No chest pain or abdominal pain. On exam she is nontoxic signs normal.  She has bilateral ptosis. She has weakness in both extremities upper and lower. Plan is IV access, admission, consultation to neurology, plan IVIG or plasmapheresis. Leandra Agudelo.  Robert López MD, [facility]  Attending Emergency  Physician                Jacquelyn Guzmán MD  11/09/17 [id number]       Jacquelyn Guzmán MD  11/09/17 [id number]

## 2017-11-09 NOTE — CONSULTS
NEUROLOGY INPATIENT CONSULT NOTE    11/9/2017         Gianluca Abrams is a  76 y.o. female admitted on 11/9/2017 with  No admission diagnoses are documented for this encounter. History is obtained mostly from the patient and the medical record and from the caregivers. Chart is reviewed and patient is examined. Briefly, this is a  76 y.o. female admitted on 11/9/2017 with diagnosis of generalized myasthenia gravis established June 2017 with principal complaints bilateral ptosis OD greater than OS and dysphagia. She had a course of plasma exchange. She was placed on prednisone and Mestinon. She states she eventually improved to the point where she was having no dysphagia and the ptosis was better. In the last couple weeks she has had some increase of both of these symptoms. She has been placed on increased dose of prednisone and maintained on the prior dose of Mestinon 60 mg 4 times a day on a every 4 hours schedule beginning at 0600. When she was in her neurologist office on November 7, the neurologist wanted her to come back to the hospital and get another course of plasma exchange. The patient refused. The neurologist then asked her to promise that if she were not better in 2 days that she would come to the hospital; she did make the promise and that she is not better than she was 2 days ago she came to the hospital today with her family. However it is clear that if she does not have to come into the hospital and have a course of plasma exchange she does not want this and would muchrather go home. She states that although she does have some trouble swallowing, when the examiner first entered the room today, she had just finished drinking a bottle of an sure without any significant difficulty. Only she had a very small amount of saliva on the right corner of her mouth. She states she sees double a little sometimes\" but no more recently than before the last 2 weeks.   She has severe degenerative arthritis on account of which she has not walked for 8 or 9 years and has substantial problems with the use of her hands on this basis also, right more than left, although even the left hand has trouble holding onto eating utensils. These are chronic problems not changed in the last couple weeks. she states that she is not having any problems breathing now and denies that she is having trouble talking due to fatigue or SOB. There is no complaint of any significant decrease in her ability to use her upper extremities in the last few weeks. No current facility-administered medications on file prior to encounter. Current Outpatient Prescriptions on File Prior to Encounter   Medication Sig Dispense Refill    predniSONE (DELTASONE) 20 MG tablet WK 1 : 3 TAB IN AM; WK 2 : 2 TAB IN AM., WK 3 : one TAB IN AM and stop 42 tablet 0    pyridostigmine (MESTINON) 60 MG tablet Take 1 tablet by mouth 4 times daily 120 tablet 3    Elastic Bandages & Supports (JOBST KNEE HIGH COMPRESSION SM) MISC 2 each by Does not apply route daily as needed (swelling) 2 each 0    naproxen (NAPROSYN) 500 MG tablet Take 1 tablet by mouth 2 times daily (with meals) for 14 days 28 tablet 0    lisinopril (PRINIVIL;ZESTRIL) 40 MG tablet take 1 tablet by mouth once daily 90 tablet 1    simvastatin (ZOCOR) 10 MG tablet take 1 tablet by mouth every evening 90 tablet 3    RA ACETAMINOPHEN EX  MG tablet take 2 tablets by mouth every 6 hours if needed for pain 120 tablet 1    Incontinence Supply Disposable (UNDERPADS EXTRA LARGE) MISC Use as needed 20 Bottle 3    Incontinence Supply Disposable (ATTENDS BRIEFS CLASSIC X-LARGE) MISC 2 packages 30 mL 3    Misc. Devices MISC Chux (underpad) to put under bedsheet 30 Device 1    Handicap Placard MISC by Does not apply route DX: OA both knees  Can't walk greater than 200 feet. Expires in 5 years.  1 each 0    vitamin D (CHOLECALCIFEROL) 1000 UNIT TABS tablet Take 1 tablet by mouth daily 30 tablet 5    RA ASPIRIN EC 81 MG EC tablet take 1 tablet by mouth once daily 90 tablet 5    miconazole (ANTI-FUNGAL) 2 % cream Apply topically 2 times daily. 1 Tube 1    Incontinence Supply Disposable (CERTAINTY UNDERPADS 70\"H57\") MISC Disp: disposable underpads \"chucks\" DX:urinary incontinence, wheelchair use, DM 2 Bottle 3    Multiple Vitamins-Minerals (MULTIVITAMIN WITH MINERALS) tablet Take 1 tablet by mouth daily. 30 tablet 11    Zinc Oxide 11.3 % CREA Apply topically bid 1 Tube 3    Skin Protectants, Misc. (EUCERIN) cream Apply topically as needed. 1 Package 1    Misc. Devices (RING CUSHION 16\") MISC Dispense 1 ring cushion  Dx: pressure sores 1 each 0     Allergies: Bridger Dawson has No Known Allergies. Past Medical History:   Diagnosis Date    . ............. 4/1/2013    Anemia     Ankle fracture     Cervical spondylosis     COPD (chronic obstructive pulmonary disease) (HCC) 6/1/2017    Hepatomegaly     HTN (hypertension)     Hyperlipemia     Lung nodule     Myasthenia (HCC)     Osteoarthritis     Prediabetes     PVD (peripheral vascular disease) (HCC)     Urinary incontinence        Past Surgical History:   Procedure Laterality Date    CERVICAL SPINE SURGERY      COLONOSCOPY      EYE SURGERY      LUMBAR SPINE SURGERY       Social History: Bridger Dawson  reports that she quit smoking about 32 years ago. She quit after 30.00 years of use. She has never used smokeless tobacco. She reports that she does not drink alcohol or use drugs.     Family History   Problem Relation Age of Onset    Heart Attack Mother     High Blood Pressure Other        Current Medications:      PRN Meds include:     ROS:   Constitutional Negative for fever and chills   HEENT Negative for ear discharge, ear pain, nosebleed   Eyes Negative for photophobia, pain and discharge   Respiratory Negative for hemoptysis and sputum   Cardiovascular Negative for orthopnea, claudication and PND   Gastrointestinal Negative for abdominal pain, diarrhea, blood in stool   Musculoskeletal Negative for joint pain, negative for myalgia   Skin Negative for rash or itching   Endo/heme/allergies Negative for polydipsia, environmental allergy   Psychiatric Negative for suicidal ideation. Patient is not anxious           Objective:   /75   Pulse 98   Temp 97 °F (36.1 °C) (Oral)   Resp 15   Ht 5' 1\" (1.549 m)   SpO2 97%     Blood pressure range: Systolic (39YBR), TPF:860 , Min:122 , AEV:868   ; Diastolic (11SLB), UHU:75, Min:75, Max:75      Continuous infusions:     ON EXAMINATION:  GENERAL  Appears comfortable and in no distress   HEENT  NC/ AT   NECK  Supple and no bruits heard   MENTAL STATUS:  Alert, oriented, intact memory, no confusion, normal speech, normal language, no hallucination or delusion   CRANIAL NERVES: II     -        Visual fields intact to confrontation  III,IV,VI - PRRRE OMs full, no afferent defect, no                      JACI, bilateral ptosis moderate right, mild left  V     -     Normal facial sensation  VII    -     Normal facial symmetry with no facial diplegia  VIII   -     Intact hearing  IX,X -     Symmetrical palate, voice is fairly loud and clear, neither hypophonic nor nasal and not fatiguing despite extensive conversation with the patient. XI    -     Symmetrical shoulder shrug, normal neck flexion. Also normal neck extension. XII   -     Midline tongue, no atrophy    MOTOR FUNCTION:  Normal bulk, normal tone,   no involuntary movements, no tremor. Prominent arthritic deformity of both hands right greater than left. Extensor digitorum communis (EDC) strength on the left 4+/5 without fatigue.    SENSORY FUNCTION:  Normal touch, normal pain grossly   CEREBELLAR FUNCTION: No upper extremity limb dysmetria   REFLEX FUNCTION:  Symmetric, no perverted reflex, no Babinski sign   STATION and GAIT  Not tested         Data:    Lab Results:     Lab Results   Component Value Date    CHOL 140 06/26/2015    LDLCHOLESTEROL 68 06/26/2015    HDL 62 06/26/2015    TRIG 50 06/26/2015    ALT 8 11/09/2017    AST 16 11/09/2017    TSH 1.49 06/02/2017    INR 1.1 11/09/2017    LABA1C 5.7 05/02/2016    LABMICR 21 04/29/2016     Hematology:  Recent Labs      11/09/17   1442  11/09/17   1454   WBC  5.9   --    HGB  11.8*   --    HCT  40.4   --    PLT  See Reflexed IPF Result   --    INR   --   1.1     Chemistry:  Recent Labs      11/09/17   1438  11/09/17   1442   NA   --   140   K   --   4.4   CL   --   100   CO2   --   28   GLUCOSE   --   127*   BUN   --   10   CREATININE  0.54  0.32*   CALCIUM   --   9.5     Recent Labs      11/09/17   1442   PROT  7.2   LABALBU  3.9   AST  16   ALT  8           Diagnostic data reviewed:      Impression and Plan: Ms. Leeann Kaiser is a 76 y.o. female with five-month history of diagnosis of generalized MG with mild exacerbation over the last several weeks with recurrence of some dysphagia that she had had earlier in the year though still with very effective swallowing as demonstrated by her drinking the Ensure just before the examiner entered the room, and as further implied by the absence of significant bulbar signs on exam.  She also has normal neck extension strength so that involuntary neck flexion will not be interfering with speech or swallowing. Her principal complaints, and the only ones that really bother her, are that she has some trouble swallowing and she really does not like the fact that her right eyelid is drooping. She is not having any evidence for respiratory compromise included in this note that although Respiratory therapy was unable to locate a [employer]'s spirometer, but her left was -22. This presentationdoes not represent an exacerbation of sufficient severity to warrant hospitalization, left alone a course of IVIG or plasma exchange.   She has not been on the increased dose of steroids for sufficient length of time to  the effect of these in improving her

## 2017-11-09 NOTE — ED PROVIDER NOTES
1/1/1987    Smokeless tobacco: Never Used    Alcohol use No    Drug use: No    Sexual activity: Not on file     Other Topics Concern    Not on file     Social History Narrative    No narrative on file       Family History   Problem Relation Age of Onset    Heart Attack Mother     High Blood Pressure Other        Allergies:  Review of patient's allergies indicates no known allergies. Home Medications:  Prior to Admission medications    Medication Sig Start Date End Date Taking? Authorizing Provider   predniSONE (DELTASONE) 20 MG tablet WK 1 : 3 TAB IN AM; WK 2 : 2 TAB IN AM., WK 3 : one TAB IN AM and stop 11/7/17 11/17/17  Cindy Scott MD   pyridostigmine (MESTINON) 60 MG tablet Take 1 tablet by mouth 4 times daily 11/7/17   Cindy Scott MD   Elastic Bandages & Supports (JOBST KNEE HIGH COMPRESSION SM) MISC 2 each by Does not apply route daily as needed (swelling) 9/8/17   Mark Smart MD   naproxen (NAPROSYN) 500 MG tablet Take 1 tablet by mouth 2 times daily (with meals) for 14 days 9/8/17 11/7/17  Brandy Hyatt MD   lisinopril (PRINIVIL;ZESTRIL) 40 MG tablet take 1 tablet by mouth once daily 9/7/17   Brandy Hyatt MD   simvastatin (ZOCOR) 10 MG tablet take 1 tablet by mouth every evening 9/7/17   Mark Smart MD   RA ACETAMINOPHEN EX  MG tablet take 2 tablets by mouth every 6 hours if needed for pain 9/7/17   Brandy Hyatt MD   Incontinence Supply Disposable (UNDERPADS EXTRA LARGE) MISC Use as needed 8/18/17   Brandy Hyatt MD   Incontinence Supply Disposable (ATTENDS BRIEFS CLASSIC X-LARGE) MISC 2 packages 6/15/17   Cely Petersen MD   Misc. Devices MISC Chux (underpad) to put under bedsheet 6/15/17   Cely Petersen MD   Handicap Placard MISC by Does not apply route DX: OA both knees  Can't walk greater than 200 feet. Expires in 5 years.  5/9/17   Cely Petersen MD   vitamin D (CHOLECALCIFEROL) 1000 UNIT TABS tablet Take 1 tablet by mouth daily 8/1/16   Sylvia Kelly MD RA ASPIRIN EC 81 MG EC tablet take 1 tablet by mouth once daily 12/11/15   Cecilia Graf MD   miconazole (ANTI-FUNGAL) 2 % cream Apply topically 2 times daily. 3/26/15   Cedric South MD   Incontinence Supply Disposable (CERTAINTY UNDERPADS 42\"N93\") MISC Disp: disposable underpads \"chucks\" DX:urinary incontinence, wheelchair use, DM 3/26/15   Cedric South MD   Multiple Vitamins-Minerals (MULTIVITAMIN WITH MINERALS) tablet Take 1 tablet by mouth daily. 3/26/15 11/7/17  Cedric South MD   Zinc Oxide 11.3 % CREA Apply topically bid 3/26/15   Cedric South MD   Skin Protectants, Misc. (EUCERIN) cream Apply topically as needed. 7/10/14   Arti Seay MD   Mis. Devices (RING CUSHION 16\") MISC Dispense 1 ring cushion  Dx: pressure sores 11/8/13   Tana Casarez MD       REVIEW OF SYSTEMS    (2-9 systems for level 4, 10 or more for level 5)      Review of Systems   Constitutional: Positive for fatigue. Negative for chills and fever. HENT: Positive for trouble swallowing. Eyes: Negative for visual disturbance. Respiratory: Negative for shortness of breath. Cardiovascular: Negative for chest pain. Gastrointestinal: Negative for abdominal pain, nausea and vomiting. Musculoskeletal: Positive for gait problem. Skin: Negative for rash. Neurological: Positive for weakness. Negative for dizziness, light-headedness and headaches. Psychiatric/Behavioral: Negative for agitation and confusion. PHYSICAL EXAM   (up to 7 for level 4, 8 or more for level 5)      INITIAL VITALS:   /75   Pulse 98   Temp 97 °F (36.1 °C) (Oral)   Resp 15   Ht 5' 1\" (1.549 m)   SpO2 97%     Physical Exam   Constitutional: She is oriented to person, place, and time. She appears well-developed and well-nourished. No distress. HENT:   Head: Normocephalic and atraumatic. Right ptosis > left. Eyes: Pupils are equal, round, and reactive to light. Neck: Normal range of motion. Neck supple. Cardiovascular: Normal rate, regular rhythm, normal heart sounds and intact distal pulses. No murmur heard. Pulmonary/Chest: Effort normal and breath sounds normal. No respiratory distress. She has no wheezes. She has no rales. She exhibits no tenderness. Abdominal: Soft. She exhibits no distension. There is no tenderness. There is no rebound and no guarding. Musculoskeletal: Normal range of motion. She exhibits no edema or tenderness. Neurological: She is alert and oriented to person, place, and time. Alert, awake and oriented. Bilateral lower extremity weakness. Sensation intact bilaterally. Strength intact to the left upper extremity. 4-5 strength in the right upper extremity. Sensation intact to bilateral upper extremity. Skin: Skin is warm and dry. No rash noted. She is not diaphoretic. No erythema. Psychiatric: She has a normal mood and affect. Her behavior is normal.   Nursing note and vitals reviewed. DIFFERENTIAL  DIAGNOSIS     PLAN (LABS / IMAGING / EKG):  Orders Placed This Encounter   Procedures    Urine Culture    XR Chest Portable    Basic Metabolic Panel    CBC Auto Differential    Hepatic Function Panel    Urinalysis    Hemoglobin and hematocrit, blood    SODIUM (POC)    POTASSIUM (POC)    CHLORIDE (POC)    Protime-INR    PREVIOUS SPECIMEN    CALCIUM, IONIC (POC)    Immature Platelet Fraction    Microscopic Urinalysis    Continuous Pulse Oximetry    Inpatient consult to Neurology    POCT troponin    POC Blood Gas and Chemistry    Venous Blood Gas, POC    Creatinine W/GFR Point of Care    Lactic Acid, POC    POCT Glucose    Anion Gap (Calc) POC    POCT troponin    POCT troponin    EKG 12 Lead    Insert peripheral IV       MEDICATIONS ORDERED:  No orders of the defined types were placed in this encounter.       DDX: Myasthenia crisis, UTI, ACS, electrolyte abnormalities    DIAGNOSTIC RESULTS / EMERGENCY DEPARTMENT COURSE / MDM     LABS:  Results for orders placed or performed during the hospital encounter of 24/40/17   Basic Metabolic Panel   Result Value Ref Range    Glucose 127 (H) 70 - 99 mg/dL    BUN 10 8 - 23 mg/dL    CREATININE 0.32 (L) 0.50 - 0.90 mg/dL    Bun/Cre Ratio NOT REPORTED 9 - 20    Calcium 9.5 8.6 - 10.4 mg/dL    Sodium 140 135 - 144 mmol/L    Potassium 4.4 3.7 - 5.3 mmol/L    Chloride 100 98 - 107 mmol/L    CO2 28 20 - 31 mmol/L    Anion Gap 12 9 - 17 mmol/L    GFR Non-African American >60 >60 mL/min    GFR African American >60 >60 mL/min    GFR Comment          GFR Staging NOT REPORTED    CBC Auto Differential   Result Value Ref Range    WBC 5.9 3.5 - 11.3 k/uL    RBC 4.68 3.95 - 5.11 m/uL    Hemoglobin 11.8 (L) 11.9 - 15.1 g/dL    Hematocrit 40.4 36.3 - 47.1 %    MCV 86.3 82.6 - 102.9 fL    MCH 25.2 25.2 - 33.5 pg    MCHC 29.2 28.4 - 34.8 g/dL    RDW 15.9 (H) 11.8 - 14.4 %    Platelets See Reflexed IPF Result 138 - 453 k/uL    MPV NOT REPORTED 8.1 - 13.5 fL    Differential Type NOT REPORTED     WBC Morphology NOT REPORTED     RBC Morphology ANISOCYTOSIS PRESENT     Platelet Estimate NOT REPORTED     Seg Neutrophils 73 (H) 36 - 65 %    Lymphocytes 24 24 - 43 %    Monocytes 2 (L) 3 - 12 %    Eosinophils % 0 (L) 1 - 4 %    Basophils 1 0 - 2 %    Immature Granulocytes 0 0 %    Segs Absolute 4.31 1.50 - 8.10 k/uL    Absolute Lymph # 1.44 1.10 - 3.70 k/uL    Absolute Mono # 0.11 0.10 - 1.20 k/uL    Absolute Eos # <0.03 0.00 - 0.44 k/uL    Basophils # 0.03 0.00 - 0.20 k/uL    Absolute Immature Granulocyte <0.03 0.00 - 0.30 k/uL   Hepatic Function Panel   Result Value Ref Range    Alb 3.9 3.5 - 5.2 g/dL    Alkaline Phosphatase 54 35 - 104 U/L    ALT 8 5 - 33 U/L    AST 16 <32 U/L    Total Bilirubin 0.25 (L) 0.3 - 1.2 mg/dL    Bilirubin, Direct <0.08 <0.31 mg/dL    Bilirubin, Indirect CANNOT BE CALCULATED 0.00 - 1.00 mg/dL    Total Protein 7.2 6.4 - 8.3 g/dL    Globulin NOT REPORTED 1.5 - 3.8 g/dL Albumin/Globulin Ratio 1.2 1.0 - 2.5   Urinalysis   Result Value Ref Range    Color, UA YELLOW YEL    Turbidity UA CLOUDY (A) CLEAR    Glucose, Ur 1+ (A) NEG    Bilirubin Urine NEGATIVE NEG    Ketones, Urine TRACE (A) NEG    Specific Gravity, UA 1.014 1.005 - 1.030    Urine Hgb SMALL (A) NEG    pH, UA 7.0 5.0 - 8.0    Protein, UA NEGATIVE NEG    Urobilinogen, Urine Normal NORM    Nitrite, Urine NEGATIVE NEG    Leukocyte Esterase, Urine NEGATIVE NEG    Urinalysis Comments NOT REPORTED    Hemoglobin and hematocrit, blood   Result Value Ref Range    POC Hemoglobin 13.9 12.0 - 16.0 g/dL    POC Hematocrit 41 36 - 46 %   SODIUM (POC)   Result Value Ref Range    POC Sodium 140 138 - 146 mmol/L   POTASSIUM (POC)   Result Value Ref Range    POC Potassium 4.2 3.5 - 4.5 mmol/L   CHLORIDE (POC)   Result Value Ref Range    POC Chloride 105 98 - 107 mmol/L   Protime-INR   Result Value Ref Range    Protime 11.5 9.4 - 12.6 sec    INR 1.1    CALCIUM, IONIC (POC)   Result Value Ref Range    POC Ionized Calcium 1.17 1.15 - 1.33 mmol/L   Immature Platelet Fraction   Result Value Ref Range    Platelet, Immature Fraction 3.5 1.1 - 10.3 %    Platelet, Fluorescence 199 138 - 453 k/uL   Microscopic Urinalysis   Result Value Ref Range    -          WBC, UA None 0 - 5 /HPF    RBC, UA 0 TO 2 0 - 2 /HPF    Casts UA NOT REPORTED 0 - 2 /LPF    Crystals UA NOT REPORTED NONE /HPF    Epithelial Cells UA 0 TO 2 0 - 5 /HPF    Renal Epithelial, Urine NOT REPORTED 0 /HPF    Bacteria, UA MODERATE (A) NONE    Mucus, UA 1+ (A) NONE    Trichomonas, UA NOT REPORTED NONE    Amorphous, UA NOT REPORTED NONE    Other Observations UA NOT REPORTED NREQ    Yeast, UA NOT REPORTED NONE   Venous Blood Gas, POC   Result Value Ref Range    pH, [place] 7.429 7.320 - 7.430    pCO2, [place] 46.7 41.0 - 51.0 mm Hg    pO2, [place] 37.5 30.0 - 50.0 mm Hg    HCO3, Venous 31.0 (H) 22.0 - 29.0 mmol/L    Total CO2, Venous 32 (H) 23.0 - 30.0 mmol/L    Negative Base Excess, [place] NOT REPORTED 0.0 - 2.0    Positive Base Excess, [place] 6 (H) 0.0 - 3.0    O2 Sat, [place] 72 60.0 - 85.0 %    O2 Device/Flow/% NOT REPORTED     Basilio Test NOT REPORTED     Sample Site NOT REPORTED     Mode NOT REPORTED     FIO2 NOT REPORTED     Pt Temp NOT REPORTED     POC pH Temp NOT REPORTED     POC pCO2 Temp NOT REPORTED mm Hg    POC pO2 Temp NOT REPORTED mm Hg   Creatinine W/GFR Point of Care   Result Value Ref Range    POC Creatinine 0.54 0.51 - 1.19 mg/dL    GFR Comment >60 >60 mL/min    GFR Non-African American >60 >60 mL/min    GFR Comment         Lactic Acid, POC   Result Value Ref Range    POC Lactic Acid 1.27 0.56 - 1.39 mmol/L   POCT Glucose   Result Value Ref Range    POC Glucose 134 (H) 74 - 100 mg/dL   Anion Gap (Calc) POC   Result Value Ref Range    Anion Gap 4 (L) 7 - 16 mmol/L   POCT troponin   Result Value Ref Range    POC Troponin I 0.00 0.00 - 0.10 ng/mL    POC Troponin Interp       The Troponin-I (POC) results cannot be compared to the Troponin-T results. POCT troponin   Result Value Ref Range    POC Troponin I 0.00 0.00 - 0.10 ng/mL    POC Troponin Interp       The Troponin-I (POC) results cannot be compared to the Troponin-T results. IMPRESSION: 68-year-old female presenting with generalized weakness, drooping of the right eye, difficulty swallowing. Recent diagnosis of myasthenia gravis. Saw her neurologist 2 Days Ago and started patient on prednisone after recommending admission for plasmapheresis and patient refusing. She says symptoms have worsened and has not improved. She denies any coughing after eating, chest pain or shortness of breath. Vitals within normal limits on arrival.  She appears well, nontoxic, no acute distress. Right eye ptosis noted. She has weakness to the right upper and bilateral lower extremity, which is chronic for her. We'll check basic labs including coagulations studies just in case we have to do a Rik.   Plan to speak with neurology and possible

## 2017-11-10 LAB
CULTURE: NORMAL
CULTURE: NORMAL
Lab: NORMAL
SPECIMEN DESCRIPTION: NORMAL
STATUS: NORMAL

## 2018-01-01 ENCOUNTER — TELEPHONE (OUTPATIENT)
Dept: FAMILY MEDICINE CLINIC | Age: 78
End: 2018-01-01

## 2018-01-01 ENCOUNTER — HOSPITAL ENCOUNTER (OUTPATIENT)
Dept: WOUND CARE | Age: 78
Discharge: HOME | End: 2018-12-05
Payer: COMMERCIAL

## 2018-01-01 ENCOUNTER — OFFICE VISIT (OUTPATIENT)
Dept: FAMILY MEDICINE CLINIC | Age: 78
End: 2018-01-01
Payer: COMMERCIAL

## 2018-01-01 ENCOUNTER — OFFICE VISIT (OUTPATIENT)
Dept: NEUROLOGY | Age: 78
End: 2018-01-01
Payer: COMMERCIAL

## 2018-01-01 ENCOUNTER — TELEPHONE (OUTPATIENT)
Dept: NEUROLOGY | Age: 78
End: 2018-01-01

## 2018-01-01 ENCOUNTER — HOSPITAL ENCOUNTER (OUTPATIENT)
Dept: WOUND CARE | Age: 78
Discharge: HOME | End: 2018-12-19
Payer: COMMERCIAL

## 2018-01-01 ENCOUNTER — HOSPITAL ENCOUNTER (OUTPATIENT)
Age: 78
Setting detail: SPECIMEN
Discharge: HOME | End: 2018-11-13
Payer: COMMERCIAL

## 2018-01-01 VITALS
WEIGHT: 145.06 LBS | HEIGHT: 60 IN | SYSTOLIC BLOOD PRESSURE: 109 MMHG | BODY MASS INDEX: 28.48 KG/M2 | TEMPERATURE: 98 F | DIASTOLIC BLOOD PRESSURE: 60 MMHG | HEART RATE: 96 BPM

## 2018-01-01 VITALS
BODY MASS INDEX: 28.47 KG/M2 | TEMPERATURE: 97.6 F | DIASTOLIC BLOOD PRESSURE: 57 MMHG | HEART RATE: 103 BPM | RESPIRATION RATE: 20 BRPM | WEIGHT: 145 LBS | SYSTOLIC BLOOD PRESSURE: 100 MMHG | HEIGHT: 60 IN

## 2018-01-01 VITALS
WEIGHT: 145 LBS | HEART RATE: 112 BPM | BODY MASS INDEX: 27.38 KG/M2 | SYSTOLIC BLOOD PRESSURE: 87 MMHG | DIASTOLIC BLOOD PRESSURE: 54 MMHG | HEIGHT: 61 IN

## 2018-01-01 VITALS
DIASTOLIC BLOOD PRESSURE: 59 MMHG | WEIGHT: 145 LBS | SYSTOLIC BLOOD PRESSURE: 108 MMHG | BODY MASS INDEX: 28.47 KG/M2 | HEIGHT: 60 IN | TEMPERATURE: 97.8 F | HEART RATE: 90 BPM

## 2018-01-01 VITALS
DIASTOLIC BLOOD PRESSURE: 56 MMHG | BODY MASS INDEX: 28.47 KG/M2 | TEMPERATURE: 97.7 F | RESPIRATION RATE: 16 BRPM | WEIGHT: 145 LBS | SYSTOLIC BLOOD PRESSURE: 94 MMHG | HEIGHT: 60 IN | HEART RATE: 98 BPM

## 2018-01-01 VITALS
SYSTOLIC BLOOD PRESSURE: 107 MMHG | BODY MASS INDEX: 28.32 KG/M2 | HEIGHT: 61 IN | WEIGHT: 150 LBS | HEART RATE: 84 BPM | DIASTOLIC BLOOD PRESSURE: 65 MMHG

## 2018-01-01 VITALS
DIASTOLIC BLOOD PRESSURE: 54 MMHG | HEART RATE: 101 BPM | WEIGHT: 145 LBS | SYSTOLIC BLOOD PRESSURE: 90 MMHG | TEMPERATURE: 97.6 F | HEIGHT: 60 IN | BODY MASS INDEX: 28.47 KG/M2

## 2018-01-01 DIAGNOSIS — I10 ESSENTIAL HYPERTENSION: Primary | ICD-10-CM

## 2018-01-01 DIAGNOSIS — L89.301 PRESSURE INJURY OF BUTTOCK, STAGE 1, UNSPECIFIED LATERALITY: ICD-10-CM

## 2018-01-01 DIAGNOSIS — I10 ESSENTIAL HYPERTENSION: ICD-10-CM

## 2018-01-01 DIAGNOSIS — Z23 NEED FOR VACCINATION: ICD-10-CM

## 2018-01-01 DIAGNOSIS — L89.152 PRESSURE ULCER OF COCCYGEAL REGION, STAGE II (HCC): ICD-10-CM

## 2018-01-01 DIAGNOSIS — M47.12 OSTEOARTHRITIS OF CERVICAL SPINE WITH MYELOPATHY: ICD-10-CM

## 2018-01-01 DIAGNOSIS — L89.622 PRESSURE ULCER OF LEFT HEEL, STAGE 2 (HCC): ICD-10-CM

## 2018-01-01 DIAGNOSIS — G82.20 LOWER PARAPLEGIA (HCC): ICD-10-CM

## 2018-01-01 DIAGNOSIS — I95.2 HYPOTENSION DUE TO DRUGS: Primary | ICD-10-CM

## 2018-01-01 DIAGNOSIS — G70.00 MYASTHENIA (HCC): Primary | ICD-10-CM

## 2018-01-01 DIAGNOSIS — Z76.0 MEDICATION REFILL: ICD-10-CM

## 2018-01-01 LAB
ANION GAP SERPL CALCULATED.3IONS-SCNC: 11 MMOL/L (ref 9–17)
BUN BLDV-MCNC: 7 MG/DL (ref 8–23)
BUN/CREAT BLD: ABNORMAL (ref 9–20)
CALCIUM SERPL-MCNC: 9.1 MG/DL (ref 8.6–10.4)
CHLORIDE BLD-SCNC: 99 MMOL/L (ref 98–107)
CO2: 27 MMOL/L (ref 20–31)
CREAT SERPL-MCNC: 0.33 MG/DL (ref 0.5–0.9)
GFR AFRICAN AMERICAN: >60 ML/MIN
GFR NON-AFRICAN AMERICAN: >60 ML/MIN
GFR SERPL CREATININE-BSD FRML MDRD: ABNORMAL ML/MIN/{1.73_M2}
GFR SERPL CREATININE-BSD FRML MDRD: ABNORMAL ML/MIN/{1.73_M2}
GLUCOSE BLD-MCNC: 115 MG/DL (ref 70–99)
POTASSIUM SERPL-SCNC: 3.7 MMOL/L (ref 3.7–5.3)
SODIUM BLD-SCNC: 137 MMOL/L (ref 135–144)

## 2018-01-01 PROCEDURE — 99214 OFFICE O/P EST MOD 30 MIN: CPT

## 2018-01-01 PROCEDURE — 1123F ACP DISCUSS/DSCN MKR DOCD: CPT | Performed by: PSYCHIATRY & NEUROLOGY

## 2018-01-01 PROCEDURE — G8427 DOCREV CUR MEDS BY ELIG CLIN: HCPCS | Performed by: FAMILY MEDICINE

## 2018-01-01 PROCEDURE — 1090F PRES/ABSN URINE INCON ASSESS: CPT | Performed by: PSYCHIATRY & NEUROLOGY

## 2018-01-01 PROCEDURE — 4040F PNEUMOC VAC/ADMIN/RCVD: CPT | Performed by: FAMILY MEDICINE

## 2018-01-01 PROCEDURE — 99214 OFFICE O/P EST MOD 30 MIN: CPT | Performed by: PSYCHIATRY & NEUROLOGY

## 2018-01-01 PROCEDURE — 1123F ACP DISCUSS/DSCN MKR DOCD: CPT | Performed by: FAMILY MEDICINE

## 2018-01-01 PROCEDURE — 99213 OFFICE O/P EST LOW 20 MIN: CPT

## 2018-01-01 PROCEDURE — G8399 PT W/DXA RESULTS DOCUMENT: HCPCS | Performed by: FAMILY MEDICINE

## 2018-01-01 PROCEDURE — 1090F PRES/ABSN URINE INCON ASSESS: CPT | Performed by: FAMILY MEDICINE

## 2018-01-01 PROCEDURE — 99213 OFFICE O/P EST LOW 20 MIN: CPT | Performed by: FAMILY MEDICINE

## 2018-01-01 PROCEDURE — G8427 DOCREV CUR MEDS BY ELIG CLIN: HCPCS | Performed by: PSYCHIATRY & NEUROLOGY

## 2018-01-01 PROCEDURE — 1036F TOBACCO NON-USER: CPT | Performed by: PSYCHIATRY & NEUROLOGY

## 2018-01-01 PROCEDURE — 1101F PT FALLS ASSESS-DOCD LE1/YR: CPT | Performed by: PSYCHIATRY & NEUROLOGY

## 2018-01-01 PROCEDURE — 6370000000 HC RX 637 (ALT 250 FOR IP): Performed by: SURGERY

## 2018-01-01 PROCEDURE — G8399 PT W/DXA RESULTS DOCUMENT: HCPCS | Performed by: PSYCHIATRY & NEUROLOGY

## 2018-01-01 PROCEDURE — 4040F PNEUMOC VAC/ADMIN/RCVD: CPT | Performed by: PSYCHIATRY & NEUROLOGY

## 2018-01-01 PROCEDURE — G8417 CALC BMI ABV UP PARAM F/U: HCPCS | Performed by: PSYCHIATRY & NEUROLOGY

## 2018-01-01 PROCEDURE — 1036F TOBACCO NON-USER: CPT | Performed by: FAMILY MEDICINE

## 2018-01-01 PROCEDURE — G8417 CALC BMI ABV UP PARAM F/U: HCPCS | Performed by: FAMILY MEDICINE

## 2018-01-01 PROCEDURE — 1101F PT FALLS ASSESS-DOCD LE1/YR: CPT | Performed by: FAMILY MEDICINE

## 2018-01-01 PROCEDURE — 11042 DBRDMT SUBQ TIS 1ST 20SQCM/<: CPT

## 2018-01-01 RX ORDER — SODIUM HYPOCHLORITE 1.25 MG/ML
SOLUTION TOPICAL
Qty: 1 BOTTLE | Refills: 2 | Status: SHIPPED | OUTPATIENT
Start: 2018-01-01

## 2018-01-01 RX ORDER — PYRIDOSTIGMINE BROMIDE 60 MG/1
TABLET ORAL
Qty: 150 TABLET | Refills: 5 | Status: SHIPPED | OUTPATIENT
Start: 2018-01-01 | End: 2018-01-01 | Stop reason: SDUPTHER

## 2018-01-01 RX ORDER — PYRIDOSTIGMINE BROMIDE 60 MG/1
TABLET ORAL
Qty: 150 TABLET | Refills: 5 | Status: SHIPPED | OUTPATIENT
Start: 2018-01-01 | End: 2019-01-01 | Stop reason: SDUPTHER

## 2018-01-01 RX ORDER — PREDNISONE 20 MG/1
20 TABLET ORAL DAILY
Qty: 30 TABLET | Refills: 1 | Status: SHIPPED | OUTPATIENT
Start: 2018-01-01 | End: 2019-01-01 | Stop reason: SDUPTHER

## 2018-01-01 RX ORDER — LISINOPRIL 20 MG/1
TABLET ORAL
Qty: 30 TABLET | Refills: 2 | Status: SHIPPED | OUTPATIENT
Start: 2018-01-01 | End: 2018-01-01 | Stop reason: ALTCHOICE

## 2018-01-01 RX ADMIN — LIDOCAINE HYDROCHLORIDE 5 ML: 20 JELLY TOPICAL at 15:16

## 2018-01-01 ASSESSMENT — PAIN DESCRIPTION - PAIN TYPE: TYPE: ACUTE PAIN

## 2018-01-01 ASSESSMENT — ENCOUNTER SYMPTOMS
WHEEZING: 0
NAUSEA: 0
SHORTNESS OF BREATH: 0
ABDOMINAL PAIN: 0
ABDOMINAL PAIN: 0
NAUSEA: 0
SHORTNESS OF BREATH: 0
SHORTNESS OF BREATH: 0
DIARRHEA: 0
COUGH: 0
CONSTIPATION: 0
CONSTIPATION: 0
DIARRHEA: 0
WHEEZING: 0
COUGH: 0
CONSTIPATION: 0
SORE THROAT: 0
VOMITING: 0
ABDOMINAL PAIN: 0
VOMITING: 0
DIARRHEA: 0
BACK PAIN: 0
VOMITING: 0
BLURRED VISION: 0
BLURRED VISION: 0
COUGH: 0
NAUSEA: 0

## 2018-01-01 ASSESSMENT — PATIENT HEALTH QUESTIONNAIRE - PHQ9
SUM OF ALL RESPONSES TO PHQ QUESTIONS 1-9: 0
2. FEELING DOWN, DEPRESSED OR HOPELESS: 0
1. LITTLE INTEREST OR PLEASURE IN DOING THINGS: 0
SUM OF ALL RESPONSES TO PHQ9 QUESTIONS 1 & 2: 0
SUM OF ALL RESPONSES TO PHQ QUESTIONS 1-9: 0

## 2018-01-01 ASSESSMENT — PAIN DESCRIPTION - DESCRIPTORS: DESCRIPTORS: ACHING;BURNING

## 2018-01-01 ASSESSMENT — PAIN SCALES - GENERAL
PAINLEVEL_OUTOF10: 0
PAINLEVEL_OUTOF10: 5

## 2018-01-01 ASSESSMENT — PAIN DESCRIPTION - LOCATION: LOCATION: COCCYX

## 2018-01-08 ENCOUNTER — OFFICE VISIT (OUTPATIENT)
Dept: NEUROLOGY | Age: 78
End: 2018-01-08
Payer: COMMERCIAL

## 2018-01-08 VITALS — HEART RATE: 78 BPM | DIASTOLIC BLOOD PRESSURE: 63 MMHG | SYSTOLIC BLOOD PRESSURE: 110 MMHG

## 2018-01-08 DIAGNOSIS — G82.20 LOWER PARAPLEGIA (HCC): ICD-10-CM

## 2018-01-08 DIAGNOSIS — G70.00 MYASTHENIA (HCC): Primary | ICD-10-CM

## 2018-01-08 PROCEDURE — 1036F TOBACCO NON-USER: CPT | Performed by: NURSE PRACTITIONER

## 2018-01-08 PROCEDURE — 99214 OFFICE O/P EST MOD 30 MIN: CPT | Performed by: NURSE PRACTITIONER

## 2018-01-08 PROCEDURE — G8484 FLU IMMUNIZE NO ADMIN: HCPCS | Performed by: NURSE PRACTITIONER

## 2018-01-08 PROCEDURE — G8399 PT W/DXA RESULTS DOCUMENT: HCPCS | Performed by: NURSE PRACTITIONER

## 2018-01-08 PROCEDURE — 1123F ACP DISCUSS/DSCN MKR DOCD: CPT | Performed by: NURSE PRACTITIONER

## 2018-01-08 PROCEDURE — 4040F PNEUMOC VAC/ADMIN/RCVD: CPT | Performed by: NURSE PRACTITIONER

## 2018-01-08 PROCEDURE — G8417 CALC BMI ABV UP PARAM F/U: HCPCS | Performed by: NURSE PRACTITIONER

## 2018-01-08 PROCEDURE — G8427 DOCREV CUR MEDS BY ELIG CLIN: HCPCS | Performed by: NURSE PRACTITIONER

## 2018-01-08 PROCEDURE — 1090F PRES/ABSN URINE INCON ASSESS: CPT | Performed by: NURSE PRACTITIONER

## 2018-01-08 RX ORDER — PREDNISONE 20 MG/1
20 TABLET ORAL DAILY
Qty: 30 TABLET | Refills: 3 | Status: SHIPPED | OUTPATIENT
Start: 2018-01-08 | End: 2018-02-07

## 2018-01-08 NOTE — PROGRESS NOTES
texture normal, no rashes, no lesions                                             NEUROLOGIC EXAMINATION    Neurologic Exam     Mental Status   Oriented to person, place, and time. Attention: normal.   Speech: speech is normal   Level of consciousness: alert  Normal comprehension. Cranial Nerves     CN II   Visual fields full to confrontation. CN III, IV, VI   Pupils are equal, round, and reactive to light. Extraocular motions are normal.   Diplopia: none    CN V   Facial sensation intact. CN VII   Facial expression full, symmetric. Right facial weakness: none  Left facial weakness: none    CN VIII   CN VIII normal.     CN IX, X   CN IX normal.     CN XI   CN XI normal.     CN XII   CN XII normal.     Motor Exam   Muscle bulk: normal  Overall muscle tone: normal  Right arm tone: normal  Left arm tone: normal  Right arm pronator drift: absent  Left arm pronator drift: absent  Right leg tone: normal  Left leg tone: normal    Strength   Strength 5/5 except as noted. Right iliopsoas: 0/5  Left iliopsoas: 0/5  Right quadriceps: 0/5  Left quadriceps: 0/5  Right hamstrin/5  Left hamstrin/5  Right anterior tibial: 1/5  Left anterior tibial: 1/5  Right gastroc: 1/5  Left gastroc: 1/5    Sensory Exam   Light touch normal.   Vibration normal.   Pinprick normal.     Gait, Coordination, and Reflexes     Gait  Gait: normal    Coordination   Finger to nose coordination: normal    Tremor   Resting tremor: absent    Reflexes   Right brachioradialis: 2+  Left brachioradialis: 2+  Right biceps: 2+  Left biceps: 2+  Right triceps: 2+  Left triceps: 2+  Right patellar: 2+  Left patellar: 2+  Right achilles: 2+  Left achilles: 2+  Right plantar: normal  Left plantar: normal            ASSESSMENT/PLAN:       In summary, your patient, Adelaide Whitehead exhibits the following, with associated plan:    1. Myasthenia gravis  1. Because patient does well while receiving prednisone we will add 20 mg of prednisone daily. patient was instructed on the long-term effects of chronic steroid use. 2. Continue Mestinon 90 mg at 6 AM, 60 mg at 10 AM, 90 mg at 2 PM, and 60 mg at 6 PM.  Patient has no difficulties during the night, however, if she begins to happening, we will at this time span dosage at night. 3. Patient will return in 8 weeks for reevaluation  2.  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

## 2018-01-08 NOTE — LETTER
hours if needed for pain 120 tablet 1    Incontinence Supply Disposable (UNDERPADS EXTRA LARGE) MISC Use as needed 20 Bottle 3    Incontinence Supply Disposable (ATTENDS BRIEFS CLASSIC X-LARGE) MISC 2 packages 30 mL 3    Misc. Devices MISC Chux (underpad) to put under bedsheet 30 Device 1    Handicap Placard MISC by Does not apply route DX: OA both knees  Can't walk greater than 200 feet. Expires in 5 years. 1 each 0    vitamin D (CHOLECALCIFEROL) 1000 UNIT TABS tablet Take 1 tablet by mouth daily 30 tablet 5    RA ASPIRIN EC 81 MG EC tablet take 1 tablet by mouth once daily 90 tablet 5    miconazole (ANTI-FUNGAL) 2 % cream Apply topically 2 times daily. 1 Tube 1    Incontinence Supply Disposable (CERTAINTY UNDERPADS 63\"Y58\") MISC Disp: disposable underpads \"chucks\" DX:urinary incontinence, wheelchair use, DM 2 Bottle 3    Multiple Vitamins-Minerals (MULTIVITAMIN WITH MINERALS) tablet Take 1 tablet by mouth daily. 30 tablet 11    Zinc Oxide 11.3 % CREA Apply topically bid 1 Tube 3    Skin Protectants, Misc. (EUCERIN) cream Apply topically as needed. 1 Package 1    Misc. Devices (RING CUSHION 16\") MISC Dispense 1 ring cushion  Dx: pressure sores 1 each 0     No current facility-administered medications for this visit. PHYSICAL EXAMINATION       There were no vitals filed for this visit.                                            .                                                                                                    General Appearance:  Alert, cooperative, no signs of distress, appears stated age   Head:  Normocephalic, no signs of trauma   Eyes:  Conjunctiva/corneas clear;  eyelids intact   Ears:  Normal external ear and canals   Nose: Nares normal, mucosa normal, no drainage    Throat: Lips and tongue normal; teeth normal;  gums normal   Neck: Supple, intact flexion, extension and rotation;   trachea midline;

## 2018-01-25 ENCOUNTER — OFFICE VISIT (OUTPATIENT)
Dept: FAMILY MEDICINE CLINIC | Age: 78
End: 2018-01-25
Payer: COMMERCIAL

## 2018-01-25 VITALS
HEART RATE: 103 BPM | WEIGHT: 164.9 LBS | SYSTOLIC BLOOD PRESSURE: 70 MMHG | TEMPERATURE: 98.2 F | HEIGHT: 60 IN | BODY MASS INDEX: 32.38 KG/M2 | OXYGEN SATURATION: 96 % | DIASTOLIC BLOOD PRESSURE: 40 MMHG

## 2018-01-25 DIAGNOSIS — I10 ESSENTIAL HYPERTENSION: Primary | ICD-10-CM

## 2018-01-25 PROCEDURE — 1036F TOBACCO NON-USER: CPT | Performed by: FAMILY MEDICINE

## 2018-01-25 PROCEDURE — G8484 FLU IMMUNIZE NO ADMIN: HCPCS | Performed by: FAMILY MEDICINE

## 2018-01-25 PROCEDURE — G8427 DOCREV CUR MEDS BY ELIG CLIN: HCPCS | Performed by: FAMILY MEDICINE

## 2018-01-25 PROCEDURE — 99213 OFFICE O/P EST LOW 20 MIN: CPT | Performed by: FAMILY MEDICINE

## 2018-01-25 PROCEDURE — 1090F PRES/ABSN URINE INCON ASSESS: CPT | Performed by: FAMILY MEDICINE

## 2018-01-25 PROCEDURE — G8417 CALC BMI ABV UP PARAM F/U: HCPCS | Performed by: FAMILY MEDICINE

## 2018-01-25 PROCEDURE — 1123F ACP DISCUSS/DSCN MKR DOCD: CPT | Performed by: FAMILY MEDICINE

## 2018-01-25 PROCEDURE — G8399 PT W/DXA RESULTS DOCUMENT: HCPCS | Performed by: FAMILY MEDICINE

## 2018-01-25 PROCEDURE — 4040F PNEUMOC VAC/ADMIN/RCVD: CPT | Performed by: FAMILY MEDICINE

## 2018-01-25 RX ORDER — LISINOPRIL 20 MG/1
20 TABLET ORAL DAILY
Qty: 30 TABLET | Refills: 0 | Status: SHIPPED | OUTPATIENT
Start: 2018-01-25 | End: 2018-03-23 | Stop reason: SDUPTHER

## 2018-01-25 RX ORDER — LISINOPRIL 40 MG/1
20 TABLET ORAL DAILY
Qty: 30 TABLET | Refills: 0 | Status: SHIPPED | OUTPATIENT
Start: 2018-01-25 | End: 2018-01-25 | Stop reason: SDUPTHER

## 2018-01-25 ASSESSMENT — ENCOUNTER SYMPTOMS
SHORTNESS OF BREATH: 0
GASTROINTESTINAL NEGATIVE: 1
COUGH: 0

## 2018-03-05 DIAGNOSIS — Z76.0 MEDICATION REFILL: ICD-10-CM

## 2018-03-06 ENCOUNTER — TELEPHONE (OUTPATIENT)
Dept: FAMILY MEDICINE CLINIC | Age: 78
End: 2018-03-06

## 2018-03-06 NOTE — TELEPHONE ENCOUNTER
Pt last seen by you on 1/25/18 BP was low on that visit you decreased her lisinopril at that time to 20 mg from 40 mg. Pt pharmacy is asking for Norvasc refill I do not see it on the current medication list I did not pend it . Can you please view and see if pt should still be taking this medication.  Thanks

## 2018-03-07 RX ORDER — ACETAMINOPHEN 500 MG
TABLET ORAL
Qty: 120 TABLET | Refills: 1 | Status: SHIPPED | OUTPATIENT
Start: 2018-03-07 | End: 2018-01-01 | Stop reason: SDUPTHER

## 2018-03-08 ENCOUNTER — OFFICE VISIT (OUTPATIENT)
Dept: NEUROLOGY | Age: 78
End: 2018-03-08
Payer: COMMERCIAL

## 2018-03-08 VITALS
HEART RATE: 76 BPM | HEIGHT: 61 IN | SYSTOLIC BLOOD PRESSURE: 109 MMHG | WEIGHT: 150 LBS | DIASTOLIC BLOOD PRESSURE: 69 MMHG | BODY MASS INDEX: 28.32 KG/M2

## 2018-03-08 DIAGNOSIS — G82.20 LOWER PARAPLEGIA (HCC): ICD-10-CM

## 2018-03-08 DIAGNOSIS — G70.00 MYASTHENIA (HCC): Primary | ICD-10-CM

## 2018-03-08 PROCEDURE — 1123F ACP DISCUSS/DSCN MKR DOCD: CPT | Performed by: NURSE PRACTITIONER

## 2018-03-08 PROCEDURE — 4040F PNEUMOC VAC/ADMIN/RCVD: CPT | Performed by: NURSE PRACTITIONER

## 2018-03-08 PROCEDURE — 99214 OFFICE O/P EST MOD 30 MIN: CPT | Performed by: NURSE PRACTITIONER

## 2018-03-08 PROCEDURE — 1036F TOBACCO NON-USER: CPT | Performed by: NURSE PRACTITIONER

## 2018-03-08 PROCEDURE — 1090F PRES/ABSN URINE INCON ASSESS: CPT | Performed by: NURSE PRACTITIONER

## 2018-03-08 PROCEDURE — G8484 FLU IMMUNIZE NO ADMIN: HCPCS | Performed by: NURSE PRACTITIONER

## 2018-03-08 PROCEDURE — G8417 CALC BMI ABV UP PARAM F/U: HCPCS | Performed by: NURSE PRACTITIONER

## 2018-03-08 PROCEDURE — G8427 DOCREV CUR MEDS BY ELIG CLIN: HCPCS | Performed by: NURSE PRACTITIONER

## 2018-03-08 PROCEDURE — G8399 PT W/DXA RESULTS DOCUMENT: HCPCS | Performed by: NURSE PRACTITIONER

## 2018-03-08 RX ORDER — PYRIDOSTIGMINE BROMIDE 60 MG/1
TABLET ORAL
Qty: 150 TABLET | Refills: 5 | Status: SHIPPED | OUTPATIENT
Start: 2018-03-08 | End: 2018-01-01 | Stop reason: SDUPTHER

## 2018-03-08 RX ORDER — PREDNISONE 20 MG/1
20 TABLET ORAL DAILY
Qty: 30 TABLET | Refills: 5 | Status: SHIPPED | OUTPATIENT
Start: 2018-03-08 | End: 2018-01-01 | Stop reason: ALTCHOICE

## 2018-03-08 RX ORDER — PREDNISONE 20 MG/1
1 TABLET ORAL DAILY
Refills: 0 | COMMUNITY
Start: 2018-03-05 | End: 2018-03-08 | Stop reason: SDUPTHER

## 2018-03-08 NOTE — PROGRESS NOTES
[facility]            [address]          [place], [address]          Dept: [phone]          Dept Fax: [phone]        MD Veronica Layne MD Ahmed B. Cleopatra Galt, MD Steffan Howells, MD Aleene Countess, MD Flavia Nicholson, CNP            3/8/2018    HPI:      Your patient, Al Posada returns for continuing neurologic care. Patient is a 73-year-old woman who was seen last on January 8, 2018 for evaluation of seronegative myasthenia gravis. Patient was diagnosed in June of 2017 and at that time, received a course of plasmapheresis. She was placed on a tapering dose of prednisone and Mestinon 60 mg 4 times daily. Her primary symptoms were ptosis, diplopia, and dysphagia. Patient had followed up with  and her last visit was in November 2017. Dr. Ozzy Valdes had directed the patient to the emergency department for probable exacerbation of her symptoms. She did go to the emergency department, but was discharged on a tapering dose of steroids and Mestinon 90 mg in the morning, 60 mg at lunchtime, 90 mg at dinnertime, and 60 mg at at time. At her last visit on January 8, 2018, patient was continuing to have exacerbations of her symptoms. She was continuing to have diplopia and ptosis. She was placed on prednisone 20 mg daily and the possible side effects to long-term steroids were discussed with both her and her daughter. Since her last visit, patient has been doing well she has had no episodes of double vision, blurred vision, drooping of her eyelids or difficulty breathing or swallowing. Patient does have chronic paraparesis from a failed back syndrome. Patient also has weakness in her upper extremities which is also chronic, likely secondary to cervical spondylosis.   Previous testing includes MRI of the brain showing minimal chronic small vessel ischemic disease diagnosed her and had seen her in follow-up.     2. Paraparesis secondary to failed back syndrome            Signed: Timothy Alanis, CNP

## 2018-03-23 ENCOUNTER — OFFICE VISIT (OUTPATIENT)
Dept: FAMILY MEDICINE CLINIC | Age: 78
End: 2018-03-23
Payer: COMMERCIAL

## 2018-03-23 VITALS
HEART RATE: 95 BPM | TEMPERATURE: 98.6 F | WEIGHT: 150 LBS | DIASTOLIC BLOOD PRESSURE: 72 MMHG | BODY MASS INDEX: 28.32 KG/M2 | SYSTOLIC BLOOD PRESSURE: 116 MMHG | HEIGHT: 61 IN

## 2018-03-23 DIAGNOSIS — I10 ESSENTIAL HYPERTENSION: Primary | ICD-10-CM

## 2018-03-23 DIAGNOSIS — R73.03 PREDIABETES: ICD-10-CM

## 2018-03-23 LAB — HBA1C MFR BLD: 5.9 %

## 2018-03-23 PROCEDURE — 83036 HEMOGLOBIN GLYCOSYLATED A1C: CPT | Performed by: FAMILY MEDICINE

## 2018-03-23 PROCEDURE — G8417 CALC BMI ABV UP PARAM F/U: HCPCS | Performed by: FAMILY MEDICINE

## 2018-03-23 PROCEDURE — 4040F PNEUMOC VAC/ADMIN/RCVD: CPT | Performed by: FAMILY MEDICINE

## 2018-03-23 PROCEDURE — G8484 FLU IMMUNIZE NO ADMIN: HCPCS | Performed by: FAMILY MEDICINE

## 2018-03-23 PROCEDURE — 1090F PRES/ABSN URINE INCON ASSESS: CPT | Performed by: FAMILY MEDICINE

## 2018-03-23 PROCEDURE — 1123F ACP DISCUSS/DSCN MKR DOCD: CPT | Performed by: FAMILY MEDICINE

## 2018-03-23 PROCEDURE — 99213 OFFICE O/P EST LOW 20 MIN: CPT

## 2018-03-23 PROCEDURE — G8427 DOCREV CUR MEDS BY ELIG CLIN: HCPCS | Performed by: FAMILY MEDICINE

## 2018-03-23 PROCEDURE — G8399 PT W/DXA RESULTS DOCUMENT: HCPCS | Performed by: FAMILY MEDICINE

## 2018-03-23 PROCEDURE — 1036F TOBACCO NON-USER: CPT | Performed by: FAMILY MEDICINE

## 2018-03-23 PROCEDURE — 99213 OFFICE O/P EST LOW 20 MIN: CPT | Performed by: FAMILY MEDICINE

## 2018-03-23 RX ORDER — LISINOPRIL 20 MG/1
20 TABLET ORAL DAILY
Qty: 30 TABLET | Refills: 2 | Status: SHIPPED | OUTPATIENT
Start: 2018-03-23 | End: 2018-01-01 | Stop reason: SDUPTHER

## 2018-03-23 RX ORDER — SIMVASTATIN 10 MG
TABLET ORAL
Qty: 90 TABLET | Refills: 3 | Status: SHIPPED | OUTPATIENT
Start: 2018-03-23 | End: 2019-01-01 | Stop reason: SDUPTHER

## 2018-03-23 ASSESSMENT — ENCOUNTER SYMPTOMS
NAUSEA: 0
SHORTNESS OF BREATH: 0
VOMITING: 0
DIARRHEA: 0
ABDOMINAL PAIN: 0
CONSTIPATION: 0
WHEEZING: 0
COUGH: 0
BLURRED VISION: 0

## 2018-03-23 NOTE — PROGRESS NOTES
Subjective:    Rogerio Sesay is a 76 y.o. female with  has a past medical history of . .............; Anemia; Ankle fracture; Cervical spondylosis; COPD (chronic obstructive pulmonary disease) ([facility] [address] Hepatomegaly; HTN (hypertension); Hyperlipemia; Lung nodule; Myasthenia ([address] Osteoarthritis; Prediabetes; PVD (peripheral vascular disease) ([facility] [address] and Urinary incontinence. HPI Patient is a 76year old female with several comorbidities listed above, who presents for HTN and DM follow up. On her last visit, her Lisinopril was decreased from 40mg to 20mg due to asymptomatic hypotension. Blood pressure is currently at goal. No complaints today of headaches, dizziness, visual disturbances, chest pain or SOB. No other complaints. Review of Systems   Constitutional: Negative for chills and fever. Eyes: Negative for blurred vision. Respiratory: Negative for cough, shortness of breath and wheezing. Cardiovascular: Negative for chest pain. Gastrointestinal: Negative for abdominal pain, constipation, diarrhea, nausea and vomiting. Neurological: Negative for dizziness and headaches. Objective:    /72 (Site: Left Arm, Position: Sitting, Cuff Size: Medium Adult) Comment: auto  Pulse 95   Temp 98.6 °F (37 °C) (Temporal)   Ht 5' 1\" (1.549 m)   Wt 150 lb (68 kg)   BMI 28.34 kg/m²    BP Readings from Last 3 Encounters:   03/23/18 116/72   03/08/18 109/69   01/25/18 (!) 70/40     Physical Exam   Constitutional: She is oriented to person, place, and time and well-developed, well-nourished, and in no distress. No distress. HENT:   Head: Normocephalic and atraumatic. Eyes: Pupils are equal, round, and reactive to light. Cardiovascular: Normal rate, regular rhythm, normal heart sounds and intact distal pulses. No murmur heard. Pulmonary/Chest: Effort normal and breath sounds normal. She has no wheezes. Abdominal: Soft. Bowel sounds are normal. There is no tenderness.    Lymphadenopathy:     She

## 2018-06-18 NOTE — TELEPHONE ENCOUNTER
Pt called in stating that she has decided that she does not want to have the testing done, so there is no need for you to call and schedule the appt.

## 2018-06-19 NOTE — TELEPHONE ENCOUNTER
Dr Meche Kan does not do the single fiber testing but patient does not want to have it done so I did not check any further.  Thank you, Macy Leiva

## 2018-08-14 NOTE — PROGRESS NOTES
NEUROLOGY FOLLOW-UP  Patient Name:  Gildardo Davison  :   1940  Clinic Visit Date: 2018    I saw Ms. Gildardo Davison in follow-up in the office today in continuation of neurologic care. REVIEW OF SYSTEMS  Constitutional Weight changes: absent, Fevers : absent, Fatigue: absent; Any recent hospitalizations:  absent.    HEENT Ears: normal, Eyes: glasses, Visual disturbance: absent   Reespiratory Shortness of breath: absent, Cough: present   Cardivascular Chest pain: absent, Leg swelling :absent   GI Constipation: absent, Diarrhea: absent, Swallowing change: absent    Urinary frequency: absent, Urinary urgency: absent, Urinary incontinence: absent   Musculoskeletal Neck pain: absent, Back pain: absent, Stiffness: absent, Muscle pain: absent, Joint pain: absent   Dermatological Hair loss: absent, Skin changes: absent   Neurological Memory loss: absent, Confusion: absent, Seizures: absent; Trouble walking or imbalance: present, Dizziness: absent, Weakness: absent, Numbness absent, Tremor: absent, Spasm: absent, Speech difficulty: absent, Headache: absent, Light sensitivity: absent   Psychiatric Anxiety: absent, Depression  absent, Suicidal ideations absent   Hematologic Abnormal bleeding: absent, Anemia: absent, Clotting disorder: absent, Lymph gland changes: absent

## 2018-09-05 NOTE — PATIENT INSTRUCTIONS
Thank you for letting us take care of you today. We hope all your questions were addressed. If a question was overlooked or something else comes to mind after you return home, please contact a member of your Care Team listed below. Please make sure you have a routine office visit set up to follow-up on [address]. Your Care Team at [place] [address] is Team #2  Germain Padron DO (Faculty)  Cirilo Ocampo MD (Resident)  Ngozi Mattson MD (Resident)  Josh Nolan MD (Resident)  Erlin Golden MD (Resident)  Jolynn Pena MD (Resident)  Alana Morales LPZOEY  Padmini Ahn., Asia [place], [address] ([address] [facility])  Steffany Griffith RN, ([address] [facility])  Adwoa Rosario, Ph.D., (Behavioral Services)  Elijah Ugarte [facility] (Clinical Pharmacist)     Office phone number: [phone]    If you need to get in right away due to illness, please be advised we have \"Same Day\" appointments available Monday-Friday. Please call us at [phone] option #3 to schedule your \"Same Day\" appointment. Patient Education        Low Blood Pressure: Care Instructions  Your Care Instructions    Blood pressure is a measurement of the force of the blood against the walls of the blood vessels during and after each beat of the heart. Low blood pressure is also called hypotension. It means that your blood pressure is much lower than normal. Some people, especially young, slim women, may have slightly low blood pressure without symptoms. But in many people, low blood pressure can cause symptoms such as feeling dizzy or lightheaded. When your blood pressure is too low, your heart, brain, and other organs do not get enough blood. Low blood pressure can be caused by many things, including heart problems and some medicines. Diabetes that is not under control can cause your blood pressure to drop. And so can a severe allergic reaction or infection.  Another cause is dehydration, which is when your body loses too much fluid. Treatment for low blood pressure depends on the cause. Follow-up care is a key part of your treatment and safety. Be sure to make and go to all appointments, and call your doctor if you are having problems. It's also a good idea to know your test results and keep a list of the medicines you take. How can you care for yourself at home? · Drink plenty of fluids, enough so that your urine is light yellow or clear like water. If you have kidney, heart, or liver disease and have to limit fluids, talk with your doctor before you increase the amount of fluids you drink. · Be safe with medicines. Call your doctor if you think you are having a problem with your medicine. You will get more details on the specific medicines your doctor prescribes. · Stand up or get out of bed very slowly to allow your body to adjust.  · Get plenty of rest.  · Do not smoke. Smoking increases your risk of heart attack. If you need help quitting, talk to your doctor about stop-smoking programs and medicines. These can increase your chances of quitting for good. · Limit alcohol to 2 drinks a day for men and 1 drink a day for women. Alcohol may interfere with your medicine. In addition, alcohol can make your low blood pressure worse by causing your body to lose water. When should you call for help? Call 911 anytime you think you may need emergency care. For example, call if:    · You passed out (lost consciousness).    Call your doctor now or seek immediate medical care if:    · You are dizzy or lightheaded, or you feel like you may faint.    Watch closely for changes in your health, and be sure to contact your doctor if you have any problems. Where can you learn more? Go to https://kate.[employer]. org and sign in to your [employer] account. Enter C304 in the Stardoll box to learn more about \"Low Blood Pressure: Care Instructions. \"     If you do not have an account, please click on the \"Sign Up Now\" link. Current as of: December 6, 2017  Content Version: 11.7  © 2840-1964 "[employer]orporated. Care instructions adapted under license by [facility] ([facility]). If you have questions about a medical condition or this instruction, always ask your healthcare professional. [address] any warranty or liability for your use of this information.

## 2018-09-05 NOTE — PROGRESS NOTES
Visit Information    Have you changed or started any medications since your last visit including any over-the-counter medicines, vitamins, or herbal medicines? no   Have you stopped taking any of your medications? Is so, why? -  no  Are you having any side effects from any of your medications? - no    Have you seen any other physician or provider since your last visit?  no   Have you had any other diagnostic tests since your last visit?  no   Have you been seen in the emergency room and/or had an admission in a hospital since we last saw you?  no   Have you had your routine dental cleaning in the past 6 months?  no     Do you have an active MyChart account? If no, what is the barrier?   Yes    Patient Care Team:  Pamela Deleon MD as PCP - General (Family Medicine)    Medical History Review  Past Medical, Family, and Social History reviewed and does not contribute to the patient presenting condition    Health Maintenance   Topic Date Due    DTaP/Tdap/Td vaccine (1 - Tdap) 05/25/1957    Shingles Vaccine (1 of 2 - 2 Dose Series) 05/25/1988    Pneumococcal low/med risk (2 of 2 - PCV13) 02/08/2006    Flu vaccine (1) 09/01/2018    Potassium monitoring  11/09/2018    Creatinine monitoring  11/09/2018    DEXA (modify frequency per FRAX score)  Completed
exercise and medication adherence  Reviewed prior labs and health maintenance. Continue current medications, diet and exercise. Discussed use, benefit, and side effects of prescribed medications. Barriers to medication compliance addressed. Patient given educational materials - see patient instructions. All patient questions answered. Patient voiced understanding. Requested Prescriptions     Signed Prescriptions Disp Refills    zoster recombinant adjuvanted vaccine (SHINGRIX) 50 MCG SUSR injection 0.5 mL 0     Sig: Inject 0.5 mLs into the muscle once for 1 dose       There are no discontinued medications.       Please note that this chart was generated using voice recognition Dragon dictation software.  Although every effort was made to ensure the accuracy of this automated transcription, some errors in transcription may have occurred

## 2018-09-13 PROBLEM — I95.2 HYPOTENSION DUE TO DRUGS: Status: ACTIVE | Noted: 2018-01-01

## 2018-09-13 NOTE — PROGRESS NOTES
Visit Information    Have you changed or started any medications since your last visit including any over-the-counter medicines, vitamins, or herbal medicines? no   Have you stopped taking any of your medications? Is so, why? -  no  Are you having any side effects from any of your medications? - no    Have you seen any other physician or provider since your last visit?  no   Have you had any other diagnostic tests since your last visit?  no   Have you been seen in the emergency room and/or had an admission in a hospital since we last saw you?  no   Have you had your routine dental cleaning in the past 6 months?  no     Do you have an active MyChart account? If no, what is the barrier?   Yes    Patient Care Team:  Munira Stroud MD as PCP - General (Family Medicine)    Medical History Review  Past Medical, Family, and Social History reviewed and does not contribute to the patient presenting condition    Health Maintenance   Topic Date Due    DTaP/Tdap/Td vaccine (1 - Tdap) 05/25/1957    Shingles Vaccine (1 of 2 - 2 Dose Series) 05/25/1988    Pneumococcal low/med risk (2 of 2 - PCV13) 02/08/2006    Flu vaccine (1) 09/01/2018    Potassium monitoring  11/09/2018    Creatinine monitoring  11/09/2018    DEXA (modify frequency per FRAX score)  Completed

## 2018-09-13 NOTE — PROGRESS NOTES
Subjective:    Eneida Viera is a [de-identified] y.o. female with  has a past medical history of . .............; Anemia; Ankle fracture; Cervical spondylosis; COPD (chronic obstructive pulmonary disease) ([facility] [address] Hepatomegaly; HTN (hypertension); Hyperlipemia; Lung nodule; Myasthenia ([facility] [address] Osteoarthritis; Prediabetes; PVD (peripheral vascular disease) ([facility] [address] and Urinary incontinence. HPI   Patient presents for follow-up. On her last visit last week, patient was found to be hypotensive and tachycardic. She was also having symptoms of fatigue and lightheadedness. It was thought she was dehydrated. She was told to stop her blood pressure medication (lisinopril 20 mg daily) and increase fluid intake. Patient did comply with recommendation and feels much better today. Her blood pressure and heart rate have improved. No other complaints. Review of Systems   Constitutional: Negative for chills and fever. Eyes: Negative for blurred vision. Respiratory: Negative for cough, shortness of breath and wheezing. Cardiovascular: Negative for chest pain. Gastrointestinal: Negative for abdominal pain, constipation, diarrhea, nausea and vomiting. Neurological: Negative for dizziness and headaches. Objective:    BP (!) 108/59 (Site: Left Upper Arm, Position: Sitting, Cuff Size: Medium Adult)   Pulse 90   Temp 97.8 °F (36.6 °C) (Oral)   Ht 5' (1.524 m)   Wt 145 lb (65.8 kg)   BMI 28.32 kg/m²    BP Readings from Last 3 Encounters:   09/13/18 (!) 108/59   09/05/18 (!) 90/54   08/14/18 (!) 87/54     Physical Exam   Constitutional: She is oriented to person, place, and time and well-developed, well-nourished, and in no distress. No distress. HENT:   Head: Normocephalic and atraumatic. Cardiovascular: Normal rate, regular rhythm, normal heart sounds and intact distal pulses. No murmur heard. Pulmonary/Chest: Effort normal and breath sounds normal. She has no wheezes.    Lymphadenopathy:     She has no cervical This Encounter   Medication Reason    lisinopril (PRINIVIL;ZESTRIL) 20 MG tablet Therapy completed         Please note that this chart was generated using voice recognition Dragon dictation software.  Although every effort was made to ensure the accuracy of this automated transcription, some errors in transcription may have occurred

## 2018-11-13 NOTE — PROGRESS NOTES
Subjective:   Cristiane Topete is a [de-identified] y.o. female with  has a past medical history of . .............; Anemia; Ankle fracture; Cervical spondylosis; COPD (chronic obstructive pulmonary disease) ([facility] [address] Hepatomegaly; HTN (hypertension); Hyperlipemia; Lung nodule; Myasthenia ([address] [address] Osteoarthritis; Prediabetes; PVD (peripheral vascular disease) ([facility] [address] and Urinary incontinence. HPI   Patient presents for follow up. On last visit, she was taken off of her lisinopril due to hypotension. BP today is at goal off of medications. Patient no longer having symptoms of dizziness. Patient's daughter also mentions a new onset sacral decubitus ulcer. She presents a photo taken a few days ago showing the ulcer. The patient is wheelchair bound with a cushion. Review of Systems   Constitutional: Negative for chills and fever. HENT: Negative for sore throat. Eyes: Negative for visual disturbance. Respiratory: Negative for cough and shortness of breath. Cardiovascular: Negative for chest pain. Gastrointestinal: Negative for abdominal pain, constipation, diarrhea, nausea and vomiting. Genitourinary: Negative for dysuria. Musculoskeletal: Negative for back pain. Skin: Positive for wound. Neurological: Negative for dizziness and headaches. Objective:    /60 (Site: Left Upper Arm, Position: Sitting, Cuff Size: Medium Adult)   Pulse 96   Temp 98 °F (36.7 °C) (Temporal)   Ht 5' (1.524 m)   Wt 145 lb 1 oz (65.8 kg)   BMI 28.33 kg/m²    BP Readings from Last 3 Encounters:   11/13/18 109/60   09/13/18 (!) 108/59   09/05/18 (!) 90/54     Physical Exam   Constitutional: She is oriented to person, place, and time. No distress. HENT:   Head: Normocephalic and atraumatic. Cardiovascular: Normal rate, regular rhythm, normal heart sounds and intact distal pulses. No murmur heard. Pulmonary/Chest: Effort normal and breath sounds normal. She has no wheezes.    Lymphadenopathy:     She has no cervical Patient voiced understanding. Requested Prescriptions     Signed Prescriptions Disp Refills    Handicap Placard MISC 1 each 0     Sig: by Does not apply route 12 months       There are no discontinued medications.       Please note that this chart was generated using voice recognition Dragon dictation software.  Although every effort was made to ensure the accuracy of this automated transcription, some errors in transcription may have occurred

## 2018-11-13 NOTE — PROGRESS NOTES
Attending Physician Statement    Wt Readings from Last 3 Encounters:   11/13/18 145 lb 1 oz (65.8 kg)   09/13/18 145 lb (65.8 kg)   09/05/18 145 lb (65.8 kg)     Temp Readings from Last 3 Encounters:   11/13/18 98 °F (36.7 °C) (Temporal)   09/13/18 97.8 °F (36.6 °C) (Oral)   09/05/18 97.6 °F (36.4 °C) (Oral)     BP Readings from Last 3 Encounters:   11/13/18 109/60   09/13/18 (!) 108/59   09/05/18 (!) 90/54     Pulse Readings from Last 3 Encounters:   11/13/18 96   09/13/18 90   09/05/18 101         I have discussed the care of Red Richard, including pertinent history and exam findings,  with the resident. I have reviewed the key elements of all parts of the encounter with the resident. I agree with the assessment, plan and orders as documented by the resident. (GE Modifier)  Monitoring off lisinopril.  BP good

## 2018-12-05 PROBLEM — L89.152 PRESSURE ULCER OF COCCYGEAL REGION, STAGE II (HCC): Status: ACTIVE | Noted: 2018-01-01

## 2018-12-05 NOTE — PLAN OF CARE
Problem: Wound:  Goal: Will show signs of wound healing; wound closure and no evidence of infection  Will show signs of wound healing; wound closure and no evidence of infection  Outcome: Ongoing  NAME:  Anne Arredondo  YOB: 1940  MEDICAL RECORD NUMBER:  [id number]   DATE:  12/5/2018    Patient Wound Assessment   Patients wounds will be thoroughly documented throughout the course of healing; healing will be manifested by wound closure and no evidence of infection. Patient Wound Assessment Plan of Care     Review HPI, patient's medical/ surgical, family and social history on admission   Review and record current medications and treatments. Determine presence of food, drug, or other allergies. Focused physical exam on admission    Vital signs including blood pressure on admission and at each visit    Record patients finger stick blood glucose results from patient report if available. Cleanse wound and rigo wound with non-cytotoxic agent    Measure wound at each visit. Pedal pulses bilaterally every visit if patient has foot or leg ulcer    Foot, calf and ankle measurements every visit for for lower extremity wounds. Doppler if unable to palpate pedal pulses. Nurse to perform screening ankle brachial index for patients presenting with lower extremity wounds, as indicated. Provider to order arterial/venous formal studies for patient with leg or foot ulcer, as indicated. Complete nutritional assessment on admission. Repeat every 8 weeks. Complete Fall Risk Assessment on admission. Repeat at each visit. Complete Pain Assessment every visit. Refer non-wound-related pain management to Primary Care Physician. Implement appropriate pain management protocol. Complete Educational Assessment on admission and update if appropriate. Provider orders culture of wound bed post debridement as indicated, using         Goldstein technique.             Provider to treat

## 2018-12-19 PROBLEM — L89.622 PRESSURE ULCER OF LEFT HEEL, STAGE 2 (HCC): Status: ACTIVE | Noted: 2018-01-01

## 2018-12-19 NOTE — PROGRESS NOTES
Patient: Loree Her  YOB: 1940  MRN: [id number]    Chief Complaint: Pressure sore tailbone, new pressure sore left heel    HPI: Rody Ross is a [de-identified] y.o. female who was seen 12/19/2018 for follow-up at [place] [address]. She is seen for follow-up of a coccygeal pressure sore which is currently being managed with a collagen. Since the time of her last visit she has developed a new pressure sore on her left heel. Treatment is being rendered to this area. Family states that they have been trying to offload this area. In spite of this she still developed a pressure sore. Patient Active Problem List   Diagnosis Code    HTN (hypertension) I10    PVD [facility]ipheral [facility]) [facility] I73.9    Microcytic anemia D50.9    Hyperlipemia E78.5    Cervical spondylosis M47.812    Lung nodule R91.1    Essential hypertension I10    Fatigue R53.83    Leg pain, bilateral M79.604, M79.605    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HCC) F22    HLD (hyperlipidemia) E78.5    Hypercholesteremia E78.00    Prediabetes R73.03    Dysphagia R13.10    Dysarthria R47.1    Ptosis H02.409    COPD (chronic obstructive pulmonary disease) (HCC) J44.9    Immobility Z74.09    Centrilobular emphysema (HCC) J43.2    Myasthenia (HCC) G70.00    Lower paraplegia (HCC) G82.20    Hypotension due to drugs I95.2    Pressure ulcer of coccygeal region, stage II L89.152    Pressure ulcer of left heel, stage 2 C01.781     Past Medical History:   Diagnosis Date    . .............  4/1/2013    Anemia     Ankle fracture     Cervical spondylosis     COPD (chronic obstructive pulmonary disease) (HCC) 6/1/2017    Hepatomegaly     HTN (hypertension)     Hyperlipemia     Lung nodule     Myasthenia (HCC)     Osteoarthritis     Prediabetes     PVD (peripheral vascular disease) (HCC)     Urinary incontinence      Past Surgical History:   Procedure Laterality Date    CERVICAL SPINE SURGERY      COLONOSCOPY      EYE SURGERY      LUMBAR SPINE SURGERY         Current Outpatient Prescriptions:     pyridostigmine (MESTINON) 60 MG tablet, 90 mg in the morning, 60 mg afternoon, 90 mg at dinnertime and 60 mg at bedtime, Disp: 150 tablet, Rfl: 5    simvastatin (ZOCOR) 10 MG tablet, take 1 tablet by mouth every evening, Disp: 90 tablet, Rfl: 3    RA ACETAMINOPHEN EX  MG tablet, take 2 tablets by mouth every 6 hours if needed for pain, Disp: 120 tablet, Rfl: 1    vitamin D (CHOLECALCIFEROL) 1000 UNIT TABS tablet, Take 1 tablet by mouth daily, Disp: 30 tablet, Rfl: 5    RA ASPIRIN EC 81 MG EC tablet, take 1 tablet by mouth once daily, Disp: 90 tablet, Rfl: 5    Handicap Placard MISC, by Does not apply route 12 months, Disp: 1 each, Rfl: 0    predniSONE (DELTASONE) 20 MG tablet, Take 1 tablet by mouth daily, Disp: 30 tablet, Rfl: 1    Current Facility-Administered Medications:     lidocaine (XYLOCAINE) 2 % jelly, , Topical, PRN, Grady Rob MD  No Known Allergies  Social History   Substance Use Topics    Smoking status: Former Smoker     Years: 30.00     Quit date: 1/1/1985    Smokeless tobacco: Never Used    Alcohol use No       REVIEW OF SYSTEMS:    CONSTITUTIONAL:  Denies fever, fatigue, weight loss or gain  HEENT:  Denies head trauma, change in vision, change in hearing, dysphagia or odynophagia  PULMONARY: denies shortness of breath, productive cough, or hemoptysis. CARDIOVASCULAR:Denies chest pain, palpitations, orthopnea,   GASTROINTESTINAL:  Denies abdominal pain, nausea, vomiting, diarrhea, constipation, hematochezia, melena or hematemesis.    GENITOURINARY:  Denies frequency, urgency or hesitation, denies pain with urination, denies hematuria  HEMATOLOGIC/LYMPHATIC:  Denies easy bruising or excessive bleeding, no hx of malignancy  MUSCULOSKELETAL: Denies muscle wasting, denies chronic pain  SKIN: Denies new skin lesions, rashes or abscess  ENDOCRINE:Denies cold or hot intolerance, changes in weight  HEME/LYMPH: no Post-Procedure Surface Area (cm^2) 2.2 cm^2 12/19/2018  2:53 PM   Post-Procedure Volume (cm^3) 0.88 cm^3 12/19/2018  2:53 PM   Wound Assessment Clean 12/19/2018  2:53 PM   Drainage Amount Small 12/19/2018  2:53 PM   Drainage Description Serosanguinous 12/19/2018  2:53 PM   Odor None 12/19/2018  2:53 PM   Margins Attached edges; Defined edges;Epibole (rolled edges) 12/19/2018  2:53 PM   Melyssa-wound Assessment Calloused 12/19/2018  2:53 PM   Non-staged Wound Description Full thickness 12/19/2018  2:53 PM   [place]%Wound Bed 100 12/19/2018  2:53 PM   Yellow%Wound Bed 50 12/5/2018  3:14 PM       Wound 12/19/18 Plantar WCC #2 Left heel  (Active)   Wound Cleansed Rinsed/Irrigated with saline 12/19/2018  2:58 PM   Wound Length (cm) 1.6 cm 12/19/2018  2:58 PM   Wound Width (cm) 1.5 cm 12/19/2018  2:58 PM   Wound Depth (cm) 0.1 cm 12/19/2018  2:58 PM   Wound Surface Area (cm^2) 2.4 cm^2 12/19/2018  2:58 PM   Wound Volume (cm^3) 0.24 cm^3 12/19/2018  2:58 PM   Wound Assessment Intact; Fibrin;Yellow 12/19/2018  2:58 PM   Drainage Amount Moderate 12/19/2018  2:58 PM   Drainage Description Serosanguinous 12/19/2018  2:58 PM   Odor None 12/19/2018  2:58 PM   Margins Attached edges; Defined edges 12/19/2018  2:58 PM   Melyssa-wound Assessment Clean;Painful 12/19/2018  2:58 PM   Non-staged Wound Description Full thickness 12/19/2018  2:58 PM   Yellow%Wound Bed 100 12/19/2018  2:58 PM         Assessment:  1. Pressure ulcer of coccygeal region, stage II [L89.152]   2. Pressure ulcer of left heel, stage 2 [L89.622]    Discussion: The coccyx wound is 81% smaller. The new necrotic left heel ulcer will require debridement today. Plan:  1. Continue collagen to coccyx wound  2. Debride left heel decubitus ulcer  3. Dakin's moist dressings twice a day to left heel ulcer  4. Follow-up 2 weeks    Procedure:    A verbal consent was obtained. A timeout was performed.     Lidocaine gel was applied    Debridement: Excisional Debridement    Using # 10 blade scalpel the wound was sharply debrided    down through and including the removal of subcutaneous tissue. Devitalized Tissue Debrided:  Slough and necrotic tissue. Percent of Wound Debrided: 100%  Total Surface Area Debrided:  2.4 sq cm  Bleeding: None  Hemostasis:   not needed    Response to treatment:  Well tolerated by patient. This note was created with the assistance of a speech-recognition program.  Although the intention is to generate a document that actually reflects the content of the visit, no guarantees can be provided that every mistake has been identified and corrected by editing.

## 2018-12-19 NOTE — DISCHARGE INSTR - COC
Continuity of Care Form    Patient Name: Brandi Joseph   :  1940  MRN:  [id number]    Admit date:  2018  Discharge date:  ***    Code Status Order: Prior   Advance Directives:   [facility] Documentation     Date/Time Healthcare Directive Type of Healthcare Directive Copy in [address] Agent's Name Healthcare Agent's Phone Number    [phone]  -- --  No, copy requested from family  Adult Children -- --          Admitting Physician:  No admitting provider for patient encounter. PCP: Jose Miguel Yates MD    Discharging Nurse: [facility] Unit/Room#: No information available for this encounter.   Discharging Unit Phone Number: ***    Emergency Contact:   Extended Emergency Contact Information  Primary Emergency Contact: Jg [address] Phone: [phone]  Relation: Child  Secondary Emergency Contact: Bettye Dialloadelia   [address] Phone: [phone]  Relation: Child    Past Surgical History:  Past Surgical History:   Procedure Laterality Date    CERVICAL SPINE SURGERY      COLONOSCOPY      EYE SURGERY      LUMBAR SPINE SURGERY         Immunization History:   Immunization History   Administered Date(s) Administered    Pneumococcal Polysaccharide (Frrunadfh06) 2005       Active Problems:  Patient Active Problem List   Diagnosis Code    HTN (hypertension) I10    PVD (peripheral vascular disease) ([facility] I73.9    Microcytic anemia D50.9    Hyperlipemia E78.5    Cervical spondylosis M47.812    Lung nodule R91.1    Essential hypertension I10    Fatigue R53.83    Leg pain, bilateral M79.604, M79.605    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[facility] F22    HLD (hyperlipidemia) E78.5    Hypercholesteremia E78.00    Prediabetes R73.03    Dysphagia R13.10    Dysarthria R47.1    Ptosis H02.409    COPD (chronic obstructive pulmonary disease) (HCC) J44.9    Immobility Z74.09    Centrilobular emphysema ([facility]

## 2018-12-20 NOTE — PLAN OF CARE
Problem: Pressure Ulcer:  Goal: Signs of wound healing will improve  Signs of wound healing will improve  Outcome: Ongoing  Coccyx wound now 81% healed. Pt has NEW wound to LEFT heel. Pressure ulcer stage 2 to left heel. Goal: Absence of new pressure ulcer  Absence of new pressure ulcer  Outcome: Ongoing    Goal: Will show no infection signs and symptoms  Will show no infection signs and symptoms  Outcome: Ongoing      Problem: Falls - Risk of:  Goal: Will remain free from falls  Will remain free from falls  Outcome: Ongoing  Daughter states wanted to transfer patient as she does at home. Daughter needed this writer to assist as patient has flaccid BLE. Stretcher is not easily accommodating as it doesn't get very low, patient and daughter were shorter for this and needed the assistance at least for here. Daughter and patient demonstrate good relationship. Noted patient does not like much assistance from anyone except daughter. Patient is pleasant. Daughter stated sternly no home care in home. Daughter provides all care and does dressings. Daughter demonstrated safety for patient.

## 2019-01-01 ENCOUNTER — HOSPITAL ENCOUNTER (OUTPATIENT)
Dept: WOUND CARE | Age: 79
Discharge: HOME | End: 2019-03-27
Payer: COMMERCIAL

## 2019-01-01 ENCOUNTER — NURSE TRIAGE (OUTPATIENT)
Dept: ADMINISTRATIVE | Age: 79
End: 2019-01-01

## 2019-01-01 ENCOUNTER — HOSPITAL ENCOUNTER (OUTPATIENT)
Dept: WOUND CARE | Age: 79
Discharge: HOME | End: 2019-02-06
Payer: COMMERCIAL

## 2019-01-01 ENCOUNTER — APPOINTMENT (OUTPATIENT)
Dept: GENERAL RADIOLOGY | Age: 79
DRG: 871 | End: 2019-01-01
Payer: COMMERCIAL

## 2019-01-01 ENCOUNTER — APPOINTMENT (OUTPATIENT)
Dept: ULTRASOUND IMAGING | Age: 79
DRG: 871 | End: 2019-01-01
Payer: COMMERCIAL

## 2019-01-01 ENCOUNTER — HOSPITAL ENCOUNTER (OUTPATIENT)
Dept: WOUND CARE | Age: 79
Discharge: HOME | End: 2019-05-01
Payer: COMMERCIAL

## 2019-01-01 ENCOUNTER — OFFICE VISIT (OUTPATIENT)
Dept: FAMILY MEDICINE CLINIC | Age: 79
End: 2019-01-01
Payer: COMMERCIAL

## 2019-01-01 ENCOUNTER — OFFICE VISIT (OUTPATIENT)
Dept: NEUROLOGY | Age: 79
End: 2019-01-01
Payer: COMMERCIAL

## 2019-01-01 ENCOUNTER — HOSPITAL ENCOUNTER (OUTPATIENT)
Dept: WOUND CARE | Age: 79
Discharge: HOME | End: 2019-03-06

## 2019-01-01 ENCOUNTER — HOSPITAL ENCOUNTER (OUTPATIENT)
Dept: WOUND CARE | Age: 79
Discharge: HOME | End: 2019-02-20
Payer: COMMERCIAL

## 2019-01-01 ENCOUNTER — HOSPITAL ENCOUNTER (OUTPATIENT)
Dept: WOUND CARE | Age: 79
Discharge: HOME | End: 2019-03-06
Payer: COMMERCIAL

## 2019-01-01 ENCOUNTER — HOSPITAL ENCOUNTER (INPATIENT)
Age: 79
LOS: 1 days | DRG: 871 | End: 2019-05-24
Attending: EMERGENCY MEDICINE | Admitting: INTERNAL MEDICINE
Payer: COMMERCIAL

## 2019-01-01 ENCOUNTER — HOSPITAL ENCOUNTER (OUTPATIENT)
Dept: WOUND CARE | Age: 79
Discharge: HOME | End: 2019-04-24

## 2019-01-01 ENCOUNTER — TELEPHONE (OUTPATIENT)
Dept: NEUROLOGY | Age: 79
End: 2019-01-01

## 2019-01-01 ENCOUNTER — HOSPITAL ENCOUNTER (OUTPATIENT)
Dept: WOUND CARE | Age: 79
Discharge: HOME | End: 2019-01-09
Payer: COMMERCIAL

## 2019-01-01 VITALS
TEMPERATURE: 97.3 F | DIASTOLIC BLOOD PRESSURE: 48 MMHG | SYSTOLIC BLOOD PRESSURE: 93 MMHG | HEART RATE: 83 BPM | RESPIRATION RATE: 16 BRPM

## 2019-01-01 VITALS
DIASTOLIC BLOOD PRESSURE: 53 MMHG | HEART RATE: 101 BPM | BODY MASS INDEX: 27.48 KG/M2 | WEIGHT: 140 LBS | SYSTOLIC BLOOD PRESSURE: 97 MMHG | HEIGHT: 60 IN | RESPIRATION RATE: 12 BRPM

## 2019-01-01 VITALS
HEART RATE: 99 BPM | WEIGHT: 140 LBS | SYSTOLIC BLOOD PRESSURE: 92 MMHG | TEMPERATURE: 97.8 F | DIASTOLIC BLOOD PRESSURE: 49 MMHG | HEIGHT: 60 IN | RESPIRATION RATE: 12 BRPM | BODY MASS INDEX: 27.48 KG/M2

## 2019-01-01 VITALS
SYSTOLIC BLOOD PRESSURE: 101 MMHG | TEMPERATURE: 85 F | BODY MASS INDEX: 28.48 KG/M2 | WEIGHT: 145.06 LBS | HEIGHT: 60 IN | DIASTOLIC BLOOD PRESSURE: 59 MMHG | HEART RATE: 91 BPM

## 2019-01-01 VITALS
SYSTOLIC BLOOD PRESSURE: 109 MMHG | TEMPERATURE: 98.6 F | HEIGHT: 63 IN | WEIGHT: 140 LBS | HEART RATE: 90 BPM | BODY MASS INDEX: 24.8 KG/M2 | DIASTOLIC BLOOD PRESSURE: 62 MMHG | RESPIRATION RATE: 18 BRPM

## 2019-01-01 VITALS
DIASTOLIC BLOOD PRESSURE: 52 MMHG | SYSTOLIC BLOOD PRESSURE: 96 MMHG | RESPIRATION RATE: 18 BRPM | HEART RATE: 96 BPM | TEMPERATURE: 97.7 F | HEIGHT: 60 IN | WEIGHT: 145 LBS | BODY MASS INDEX: 28.47 KG/M2

## 2019-01-01 VITALS
HEART RATE: 93 BPM | HEIGHT: 65 IN | BODY MASS INDEX: 23.32 KG/M2 | WEIGHT: 140 LBS | RESPIRATION RATE: 16 BRPM | DIASTOLIC BLOOD PRESSURE: 44 MMHG | SYSTOLIC BLOOD PRESSURE: 97 MMHG | TEMPERATURE: 97.7 F

## 2019-01-01 VITALS — HEART RATE: 87 BPM | SYSTOLIC BLOOD PRESSURE: 101 MMHG | RESPIRATION RATE: 16 BRPM | DIASTOLIC BLOOD PRESSURE: 57 MMHG

## 2019-01-01 VITALS
OXYGEN SATURATION: 93 % | HEIGHT: 60 IN | SYSTOLIC BLOOD PRESSURE: 50 MMHG | BODY MASS INDEX: 22.77 KG/M2 | RESPIRATION RATE: 21 BRPM | TEMPERATURE: 97.2 F | WEIGHT: 115.96 LBS | DIASTOLIC BLOOD PRESSURE: 38 MMHG | HEART RATE: 84 BPM

## 2019-01-01 DIAGNOSIS — L89.622 STAGE II PRESSURE ULCER OF LEFT HEEL (HCC): Primary | ICD-10-CM

## 2019-01-01 DIAGNOSIS — G82.20 LOWER PARAPLEGIA (HCC): ICD-10-CM

## 2019-01-01 DIAGNOSIS — M47.12 OSTEOARTHRITIS OF CERVICAL SPINE WITH MYELOPATHY: Primary | ICD-10-CM

## 2019-01-01 DIAGNOSIS — L89.512 PRESSURE ULCER OF RIGHT ANKLE, STAGE 2 (HCC): ICD-10-CM

## 2019-01-01 DIAGNOSIS — L89.622 PRESSURE ULCER OF LEFT HEEL, STAGE 2 (HCC): Primary | ICD-10-CM

## 2019-01-01 DIAGNOSIS — A41.9 SEPTICEMIA (HCC): ICD-10-CM

## 2019-01-01 DIAGNOSIS — D64.9 ANEMIA, UNSPECIFIED TYPE: ICD-10-CM

## 2019-01-01 DIAGNOSIS — I10 ESSENTIAL HYPERTENSION: Primary | ICD-10-CM

## 2019-01-01 DIAGNOSIS — L89.622 STAGE II PRESSURE ULCER OF LEFT HEEL (HCC): ICD-10-CM

## 2019-01-01 DIAGNOSIS — L08.9 WOUND INFECTION: Primary | ICD-10-CM

## 2019-01-01 DIAGNOSIS — L89.41: ICD-10-CM

## 2019-01-01 DIAGNOSIS — Z76.0 MEDICATION REFILL: ICD-10-CM

## 2019-01-01 DIAGNOSIS — G70.00 MYASTHENIA (HCC): ICD-10-CM

## 2019-01-01 DIAGNOSIS — T14.8XXA WOUND INFECTION: Primary | ICD-10-CM

## 2019-01-01 DIAGNOSIS — L89.512 PRESSURE ULCER OF RIGHT ANKLE, STAGE 2 (HCC): Primary | ICD-10-CM

## 2019-01-01 DIAGNOSIS — L89.42 PRESSURE INJURY OF CONTIGUOUS REGION INVOLVING BACK AND RIGHT BUTTOCK, STAGE 2 (HCC): ICD-10-CM

## 2019-01-01 LAB
-: NORMAL
ABSOLUTE EOS #: 0 K/UL (ref 0–0.4)
ABSOLUTE IMMATURE GRANULOCYTE: 0 K/UL (ref 0–0.3)
ABSOLUTE LYMPH #: 0.16 K/UL (ref 1–4.8)
ABSOLUTE LYMPH #: 0.37 K/UL (ref 1–4.8)
ABSOLUTE LYMPH #: 0.45 K/UL (ref 1–4.8)
ABSOLUTE MONO #: 0 K/UL (ref 0.1–0.8)
ABSOLUTE MONO #: 0.9 K/UL (ref 0.1–0.8)
ABSOLUTE MONO #: 0.95 K/UL (ref 0.1–0.8)
ACETAMINOPHEN LEVEL: 14 UG/ML (ref 10–30)
ACTION: NORMAL
ACTION: NORMAL
ALBUMIN SERPL-MCNC: 3 G/DL (ref 3.5–5.2)
ALBUMIN SERPL-MCNC: 3.1 G/DL (ref 3.5–5.2)
ALBUMIN SERPL-MCNC: 3.4 G/DL (ref 3.5–5.2)
ALBUMIN/GLOBULIN RATIO: 1.2 (ref 1–2.5)
ALBUMIN/GLOBULIN RATIO: 1.3 (ref 1–2.5)
ALBUMIN/GLOBULIN RATIO: 1.3 (ref 1–2.5)
ALLEN TEST: ABNORMAL
ALLEN TEST: POSITIVE
ALLEN TEST: POSITIVE
ALP BLD-CCNC: 191 U/L (ref 35–104)
ALP BLD-CCNC: 192 U/L (ref 35–104)
ALP BLD-CCNC: 209 U/L (ref 35–104)
ALT SERPL-CCNC: 474 U/L (ref 5–33)
ALT SERPL-CCNC: 493 U/L (ref 5–33)
ALT SERPL-CCNC: 495 U/L (ref 5–33)
ANION GAP SERPL CALCULATED.3IONS-SCNC: 13 MMOL/L (ref 9–17)
ANION GAP SERPL CALCULATED.3IONS-SCNC: 17 MMOL/L (ref 9–17)
ANION GAP SERPL CALCULATED.3IONS-SCNC: 8 MMOL/L (ref 9–17)
ANION GAP: 10 MMOL/L (ref 7–16)
AST SERPL-CCNC: 394 U/L
AST SERPL-CCNC: 407 U/L
AST SERPL-CCNC: 452 U/L
BASOPHILS # BLD: 0 % (ref 0–2)
BASOPHILS ABSOLUTE: 0 K/UL (ref 0–0.2)
BILIRUB SERPL-MCNC: 0.86 MG/DL (ref 0.3–1.2)
BILIRUB SERPL-MCNC: 1.26 MG/DL (ref 0.3–1.2)
BILIRUB SERPL-MCNC: 1.31 MG/DL (ref 0.3–1.2)
BUN BLDV-MCNC: 23 MG/DL (ref 8–23)
BUN BLDV-MCNC: 25 MG/DL (ref 8–23)
BUN BLDV-MCNC: 25 MG/DL (ref 8–23)
BUN/CREAT BLD: ABNORMAL (ref 9–20)
CALCIUM SERPL-MCNC: 7.9 MG/DL (ref 8.6–10.4)
CALCIUM SERPL-MCNC: 7.9 MG/DL (ref 8.6–10.4)
CALCIUM SERPL-MCNC: 8.9 MG/DL (ref 8.6–10.4)
CHLORIDE BLD-SCNC: 93 MMOL/L (ref 98–107)
CHLORIDE BLD-SCNC: 98 MMOL/L (ref 98–107)
CHLORIDE BLD-SCNC: 99 MMOL/L (ref 98–107)
CO2: 20 MMOL/L (ref 20–31)
CO2: 21 MMOL/L (ref 20–31)
CO2: 26 MMOL/L (ref 20–31)
CREAT SERPL-MCNC: 0.41 MG/DL (ref 0.5–0.9)
CREAT SERPL-MCNC: 0.48 MG/DL (ref 0.5–0.9)
CREAT SERPL-MCNC: 0.49 MG/DL (ref 0.5–0.9)
DATE AND TIME: NORMAL
DATE AND TIME: NORMAL
DIFFERENTIAL TYPE: ABNORMAL
EOSINOPHILS RELATIVE PERCENT: 0 % (ref 1–4)
FERRITIN: 16 UG/L (ref 13–150)
FIO2: 2
FIO2: ABNORMAL
FIO2: ABNORMAL
FOLATE: 13.1 NG/ML
GFR AFRICAN AMERICAN: >60 ML/MIN
GFR NON-AFRICAN AMERICAN: >60 ML/MIN
GFR SERPL CREATININE-BSD FRML MDRD: >60 ML/MIN
GFR SERPL CREATININE-BSD FRML MDRD: ABNORMAL ML/MIN/{1.73_M2}
GFR SERPL CREATININE-BSD FRML MDRD: NORMAL ML/MIN/{1.73_M2}
GLUCOSE BLD-MCNC: 154 MG/DL (ref 70–99)
GLUCOSE BLD-MCNC: 171 MG/DL (ref 70–99)
GLUCOSE BLD-MCNC: 184 MG/DL (ref 70–99)
GLUCOSE BLD-MCNC: 184 MG/DL (ref 74–100)
HAV IGM SER IA-ACNC: NONREACTIVE
HCO3 VENOUS: 22.9 MMOL/L (ref 22–29)
HCT VFR BLD CALC: 13.2 % (ref 36.3–47.1)
HCT VFR BLD CALC: 18.2 % (ref 36.3–47.1)
HCT VFR BLD CALC: 24 % (ref 36.3–47.1)
HEMOGLOBIN: 3 G/DL (ref 11.9–15.1)
HEMOGLOBIN: 4.8 G/DL (ref 11.9–15.1)
HEMOGLOBIN: 6.7 G/DL (ref 11.9–15.1)
HEPATITIS B SURFACE ANTIGEN: NONREACTIVE
HEPATITIS C ANTIBODY: NONREACTIVE
IMMATURE GRANULOCYTES: 0 %
INR BLD: 1.7
IRON SATURATION: 3 % (ref 20–55)
IRON: 10 UG/DL (ref 37–145)
LACTIC ACID, SEPSIS WHOLE BLOOD: 7.1 MMOL/L (ref 0.5–1.9)
LACTIC ACID, SEPSIS WHOLE BLOOD: 8.8 MMOL/L (ref 0.5–1.9)
LACTIC ACID, SEPSIS: ABNORMAL MMOL/L (ref 0.5–1.9)
LACTIC ACID, SEPSIS: ABNORMAL MMOL/L (ref 0.5–1.9)
LACTIC ACID, WHOLE BLOOD: 10.7 MMOL/L (ref 0.7–2.1)
LACTIC ACID, WHOLE BLOOD: 2.5 MMOL/L (ref 0.7–2.1)
LACTIC ACID, WHOLE BLOOD: 4 MMOL/L (ref 0.7–2.1)
LACTIC ACID: ABNORMAL MMOL/L
LACTIC ACID: ABNORMAL MMOL/L
LYMPHOCYTES # BLD: 1 % (ref 24–44)
LYMPHOCYTES # BLD: 4 % (ref 24–44)
LYMPHOCYTES # BLD: 4 % (ref 24–44)
MAGNESIUM: 2.4 MG/DL (ref 1.6–2.6)
MCH RBC QN AUTO: 14 PG (ref 25.2–33.5)
MCH RBC QN AUTO: 18.5 PG (ref 25.2–33.5)
MCH RBC QN AUTO: 20.3 PG (ref 25.2–33.5)
MCHC RBC AUTO-ENTMCNC: 22.7 G/DL (ref 28.4–34.8)
MCHC RBC AUTO-ENTMCNC: 26.4 G/DL (ref 28.4–34.8)
MCHC RBC AUTO-ENTMCNC: 27.9 G/DL (ref 28.4–34.8)
MCV RBC AUTO: 61.7 FL (ref 82.6–102.9)
MCV RBC AUTO: 70 FL (ref 82.6–102.9)
MCV RBC AUTO: 72.7 FL (ref 82.6–102.9)
MODE: ABNORMAL
MONOCYTES # BLD: 0 % (ref 1–7)
MONOCYTES # BLD: 6 % (ref 1–7)
MONOCYTES # BLD: 8 % (ref 1–7)
MORPHOLOGY: ABNORMAL
MRSA, DNA, NASAL: NORMAL
NEGATIVE BASE EXCESS, ART: 3 (ref 0–2)
NEGATIVE BASE EXCESS, ART: 4 (ref 0–2)
NEGATIVE BASE EXCESS, VEN: 5 (ref 0–2)
NOTIFY: NORMAL
NOTIFY: NORMAL
NRBC AUTOMATED: 3.1 PER 100 WBC
NRBC AUTOMATED: 3.3 PER 100 WBC
NRBC AUTOMATED: 4.1 PER 100 WBC
NUCLEATED RED BLOOD CELLS: 3 PER 100 WBC
NUCLEATED RED BLOOD CELLS: 4 PER 100 WBC
NUCLEATED RED BLOOD CELLS: 8 PER 100 WBC
O2 DEVICE/FLOW/%: ABNORMAL
O2 SAT, VEN: 51 % (ref 60–85)
PARTIAL THROMBOPLASTIN TIME: 26.7 SEC (ref 20.5–30.5)
PATIENT TEMP: ABNORMAL
PCO2, VEN: 57.8 MM HG (ref 41–51)
PDW BLD-RTO: 25.6 % (ref 11.8–14.4)
PDW BLD-RTO: 27.6 % (ref 11.8–14.4)
PDW BLD-RTO: 30.8 % (ref 11.8–14.4)
PH VENOUS: 7.21 (ref 7.32–7.43)
PHOSPHORUS: 2.6 MG/DL (ref 2.6–4.5)
PLATELET # BLD: ABNORMAL K/UL (ref 138–453)
PLATELET ESTIMATE: ABNORMAL
PLATELET, FLUORESCENCE: NORMAL K/UL (ref 138–453)
PLATELET, IMMATURE FRACTION: NORMAL % (ref 1.1–10.3)
PMV BLD AUTO: ABNORMAL FL (ref 8.1–13.5)
PO2, VEN: 33.6 MM HG (ref 30–50)
POC CHLORIDE: 100 MMOL/L (ref 98–107)
POC CREATININE: 0.8 MG/DL (ref 0.51–1.19)
POC HCO3: 22.8 MMOL/L (ref 21–28)
POC HCO3: 26.3 MMOL/L (ref 21–28)
POC HEMATOCRIT: 18 % (ref 36–46)
POC HEMOGLOBIN: 6 G/DL (ref 12–16)
POC IONIZED CALCIUM: 1.21 MMOL/L (ref 1.15–1.33)
POC LACTIC ACID: 3.97 MMOL/L (ref 0.56–1.39)
POC O2 SATURATION: 80 % (ref 94–98)
POC O2 SATURATION: 94 % (ref 94–98)
POC PCO2 TEMP: ABNORMAL MM HG
POC PCO2: 53.5 MM HG (ref 35–48)
POC PCO2: 73.3 MM HG (ref 35–48)
POC PH TEMP: ABNORMAL
POC PH: 7.16 (ref 7.35–7.45)
POC PH: 7.24 (ref 7.35–7.45)
POC PO2 TEMP: ABNORMAL MM HG
POC PO2: 52 MM HG (ref 83–108)
POC PO2: 89.9 MM HG (ref 83–108)
POC POTASSIUM: 4 MMOL/L (ref 3.5–4.5)
POC SODIUM: 133 MMOL/L (ref 138–146)
POSITIVE BASE EXCESS, ART: ABNORMAL (ref 0–3)
POSITIVE BASE EXCESS, ART: ABNORMAL (ref 0–3)
POSITIVE BASE EXCESS, VEN: ABNORMAL (ref 0–3)
POTASSIUM SERPL-SCNC: 4.2 MMOL/L (ref 3.7–5.3)
POTASSIUM SERPL-SCNC: 4.3 MMOL/L (ref 3.7–5.3)
POTASSIUM SERPL-SCNC: 4.5 MMOL/L (ref 3.7–5.3)
PROTHROMBIN TIME: 17.6 SEC (ref 9–12)
RBC # BLD: 2.14 M/UL (ref 3.95–5.11)
RBC # BLD: 2.6 M/UL (ref 3.95–5.11)
RBC # BLD: 3.3 M/UL (ref 3.95–5.11)
RBC # BLD: ABNORMAL 10*6/UL
READ BACK: YES
READ BACK: YES
REASON FOR REJECTION: NORMAL
SAMPLE SITE: ABNORMAL
SEG NEUTROPHILS: 88 % (ref 36–66)
SEG NEUTROPHILS: 93 % (ref 36–66)
SEG NEUTROPHILS: 96 % (ref 36–66)
SEGMENTED NEUTROPHILS ABSOLUTE COUNT: 14.79 K/UL (ref 1.8–7.7)
SEGMENTED NEUTROPHILS ABSOLUTE COUNT: 8.83 K/UL (ref 1.8–7.7)
SEGMENTED NEUTROPHILS ABSOLUTE COUNT: 9.95 K/UL (ref 1.8–7.7)
SODIUM BLD-SCNC: 130 MMOL/L (ref 135–144)
SODIUM BLD-SCNC: 132 MMOL/L (ref 135–144)
SODIUM BLD-SCNC: 133 MMOL/L (ref 135–144)
SPECIMEN DESCRIPTION: NORMAL
TCO2 (CALC), ART: 25 MMOL/L (ref 22–29)
TCO2 (CALC), ART: 29 MMOL/L (ref 22–29)
TOTAL CO2, VENOUS: 25 MMOL/L (ref 23–30)
TOTAL IRON BINDING CAPACITY: 398 UG/DL (ref 250–450)
TOTAL PROTEIN: 5.3 G/DL (ref 6.4–8.3)
TOTAL PROTEIN: 5.5 G/DL (ref 6.4–8.3)
TOTAL PROTEIN: 6.2 G/DL (ref 6.4–8.3)
TROPONIN INTERP: NORMAL
TROPONIN INTERP: NORMAL
TROPONIN T: NORMAL NG/ML
TROPONIN T: NORMAL NG/ML
TROPONIN, HIGH SENSITIVITY: 10 NG/L (ref 0–14)
TROPONIN, HIGH SENSITIVITY: 9 NG/L (ref 0–14)
TSH SERPL DL<=0.05 MIU/L-ACNC: 1.51 MIU/L (ref 0.3–5)
UNSATURATED IRON BINDING CAPACITY: 388 UG/DL (ref 112–347)
VITAMIN B-12: >2000 PG/ML (ref 232–1245)
WBC # BLD: 11.3 K/UL (ref 3.5–11.3)
WBC # BLD: 15.9 K/UL (ref 3.5–11.3)
WBC # BLD: 9.2 K/UL (ref 3.5–11.3)
WBC # BLD: ABNORMAL 10*3/UL
ZZ NTE CLEAN UP: ORDERED TEST: NORMAL
ZZ NTE WITH NAME CLEAN UP: SPECIMEN SOURCE: NORMAL

## 2019-01-01 PROCEDURE — 94660 CPAP INITIATION&MGMT: CPT

## 2019-01-01 PROCEDURE — 36430 TRANSFUSION BLD/BLD COMPNT: CPT

## 2019-01-01 PROCEDURE — P9016 RBC LEUKOCYTES REDUCED: HCPCS

## 2019-01-01 PROCEDURE — 36556 INSERT NON-TUNNEL CV CATH: CPT

## 2019-01-01 PROCEDURE — 86850 RBC ANTIBODY SCREEN: CPT

## 2019-01-01 PROCEDURE — 6370000000 HC RX 637 (ALT 250 FOR IP): Performed by: STUDENT IN AN ORGANIZED HEALTH CARE EDUCATION/TRAINING PROGRAM

## 2019-01-01 PROCEDURE — 11042 DBRDMT SUBQ TIS 1ST 20SQCM/<: CPT

## 2019-01-01 PROCEDURE — 86901 BLOOD TYPING SEROLOGIC RH(D): CPT

## 2019-01-01 PROCEDURE — 2580000003 HC RX 258: Performed by: STUDENT IN AN ORGANIZED HEALTH CARE EDUCATION/TRAINING PROGRAM

## 2019-01-01 PROCEDURE — 4040F PNEUMOC VAC/ADMIN/RCVD: CPT | Performed by: FAMILY MEDICINE

## 2019-01-01 PROCEDURE — G8399 PT W/DXA RESULTS DOCUMENT: HCPCS | Performed by: PSYCHIATRY & NEUROLOGY

## 2019-01-01 PROCEDURE — 6360000002 HC RX W HCPCS

## 2019-01-01 PROCEDURE — 86709 HEPATITIS A IGM ANTIBODY: CPT

## 2019-01-01 PROCEDURE — 87641 MR-STAPH DNA AMP PROBE: CPT

## 2019-01-01 PROCEDURE — 80307 DRUG TEST PRSMV CHEM ANLYZR: CPT

## 2019-01-01 PROCEDURE — G8484 FLU IMMUNIZE NO ADMIN: HCPCS | Performed by: PSYCHIATRY & NEUROLOGY

## 2019-01-01 PROCEDURE — 99284 EMERGENCY DEPT VISIT MOD MDM: CPT

## 2019-01-01 PROCEDURE — 86920 COMPATIBILITY TEST SPIN: CPT

## 2019-01-01 PROCEDURE — G8399 PT W/DXA RESULTS DOCUMENT: HCPCS | Performed by: FAMILY MEDICINE

## 2019-01-01 PROCEDURE — 6370000000 HC RX 637 (ALT 250 FOR IP): Performed by: SURGERY

## 2019-01-01 PROCEDURE — 82746 ASSAY OF FOLIC ACID SERUM: CPT

## 2019-01-01 PROCEDURE — G8417 CALC BMI ABV UP PARAM F/U: HCPCS | Performed by: FAMILY MEDICINE

## 2019-01-01 PROCEDURE — 1123F ACP DISCUSS/DSCN MKR DOCD: CPT | Performed by: PSYCHIATRY & NEUROLOGY

## 2019-01-01 PROCEDURE — 99213 OFFICE O/P EST LOW 20 MIN: CPT | Performed by: FAMILY MEDICINE

## 2019-01-01 PROCEDURE — 83735 ASSAY OF MAGNESIUM: CPT

## 2019-01-01 PROCEDURE — 82435 ASSAY OF BLOOD CHLORIDE: CPT

## 2019-01-01 PROCEDURE — 82330 ASSAY OF CALCIUM: CPT

## 2019-01-01 PROCEDURE — 84443 ASSAY THYROID STIM HORMONE: CPT

## 2019-01-01 PROCEDURE — G8428 CUR MEDS NOT DOCUMENT: HCPCS | Performed by: FAMILY MEDICINE

## 2019-01-01 PROCEDURE — 99214 OFFICE O/P EST MOD 30 MIN: CPT | Performed by: PSYCHIATRY & NEUROLOGY

## 2019-01-01 PROCEDURE — 85025 COMPLETE CBC W/AUTO DIFF WBC: CPT

## 2019-01-01 PROCEDURE — 2580000003 HC RX 258: Performed by: EMERGENCY MEDICINE

## 2019-01-01 PROCEDURE — 11043 DBRDMT MUSC&/FSCA 1ST 20/<: CPT

## 2019-01-01 PROCEDURE — 87340 HEPATITIS B SURFACE AG IA: CPT

## 2019-01-01 PROCEDURE — 6360000002 HC RX W HCPCS: Performed by: STUDENT IN AN ORGANIZED HEALTH CARE EDUCATION/TRAINING PROGRAM

## 2019-01-01 PROCEDURE — 1090F PRES/ABSN URINE INCON ASSESS: CPT | Performed by: FAMILY MEDICINE

## 2019-01-01 PROCEDURE — 2500000003 HC RX 250 WO HCPCS: Performed by: STUDENT IN AN ORGANIZED HEALTH CARE EDUCATION/TRAINING PROGRAM

## 2019-01-01 PROCEDURE — 87040 BLOOD CULTURE FOR BACTERIA: CPT

## 2019-01-01 PROCEDURE — 1090F PRES/ABSN URINE INCON ASSESS: CPT | Performed by: PSYCHIATRY & NEUROLOGY

## 2019-01-01 PROCEDURE — 85055 RETICULATED PLATELET ASSAY: CPT

## 2019-01-01 PROCEDURE — 86900 BLOOD TYPING SEROLOGIC ABO: CPT

## 2019-01-01 PROCEDURE — 84484 ASSAY OF TROPONIN QUANT: CPT

## 2019-01-01 PROCEDURE — 1036F TOBACCO NON-USER: CPT | Performed by: PSYCHIATRY & NEUROLOGY

## 2019-01-01 PROCEDURE — 83540 ASSAY OF IRON: CPT

## 2019-01-01 PROCEDURE — 99211 OFF/OP EST MAY X REQ PHY/QHP: CPT | Performed by: FAMILY MEDICINE

## 2019-01-01 PROCEDURE — 6360000002 HC RX W HCPCS: Performed by: EMERGENCY MEDICINE

## 2019-01-01 PROCEDURE — 84132 ASSAY OF SERUM POTASSIUM: CPT

## 2019-01-01 PROCEDURE — 76705 ECHO EXAM OF ABDOMEN: CPT

## 2019-01-01 PROCEDURE — 85014 HEMATOCRIT: CPT

## 2019-01-01 PROCEDURE — 80053 COMPREHEN METABOLIC PANEL: CPT

## 2019-01-01 PROCEDURE — 82803 BLOOD GASES ANY COMBINATION: CPT

## 2019-01-01 PROCEDURE — 86803 HEPATITIS C AB TEST: CPT

## 2019-01-01 PROCEDURE — 84295 ASSAY OF SERUM SODIUM: CPT

## 2019-01-01 PROCEDURE — 99212 OFFICE O/P EST SF 10 MIN: CPT

## 2019-01-01 PROCEDURE — 84100 ASSAY OF PHOSPHORUS: CPT

## 2019-01-01 PROCEDURE — 99291 CRITICAL CARE FIRST HOUR: CPT | Performed by: INTERNAL MEDICINE

## 2019-01-01 PROCEDURE — 4040F PNEUMOC VAC/ADMIN/RCVD: CPT | Performed by: PSYCHIATRY & NEUROLOGY

## 2019-01-01 PROCEDURE — 85730 THROMBOPLASTIN TIME PARTIAL: CPT

## 2019-01-01 PROCEDURE — 71045 X-RAY EXAM CHEST 1 VIEW: CPT

## 2019-01-01 PROCEDURE — 82565 ASSAY OF CREATININE: CPT

## 2019-01-01 PROCEDURE — G8427 DOCREV CUR MEDS BY ELIG CLIN: HCPCS | Performed by: PSYCHIATRY & NEUROLOGY

## 2019-01-01 PROCEDURE — G8484 FLU IMMUNIZE NO ADMIN: HCPCS | Performed by: FAMILY MEDICINE

## 2019-01-01 PROCEDURE — 1036F TOBACCO NON-USER: CPT | Performed by: FAMILY MEDICINE

## 2019-01-01 PROCEDURE — 82728 ASSAY OF FERRITIN: CPT

## 2019-01-01 PROCEDURE — 1123F ACP DISCUSS/DSCN MKR DOCD: CPT | Performed by: FAMILY MEDICINE

## 2019-01-01 PROCEDURE — 02HV33Z INSERTION OF INFUSION DEVICE INTO SUPERIOR VENA CAVA, PERCUTANEOUS APPROACH: ICD-10-PCS | Performed by: INTERNAL MEDICINE

## 2019-01-01 PROCEDURE — C9113 INJ PANTOPRAZOLE SODIUM, VIA: HCPCS | Performed by: STUDENT IN AN ORGANIZED HEALTH CARE EDUCATION/TRAINING PROGRAM

## 2019-01-01 PROCEDURE — 2000000000 HC ICU R&B

## 2019-01-01 PROCEDURE — 99214 OFFICE O/P EST MOD 30 MIN: CPT

## 2019-01-01 PROCEDURE — 83605 ASSAY OF LACTIC ACID: CPT

## 2019-01-01 PROCEDURE — 85610 PROTHROMBIN TIME: CPT

## 2019-01-01 PROCEDURE — 83550 IRON BINDING TEST: CPT

## 2019-01-01 PROCEDURE — 96365 THER/PROPH/DIAG IV INF INIT: CPT

## 2019-01-01 PROCEDURE — 1101F PT FALLS ASSESS-DOCD LE1/YR: CPT | Performed by: PSYCHIATRY & NEUROLOGY

## 2019-01-01 PROCEDURE — 82607 VITAMIN B-12: CPT

## 2019-01-01 PROCEDURE — 1101F PT FALLS ASSESS-DOCD LE1/YR: CPT | Performed by: FAMILY MEDICINE

## 2019-01-01 PROCEDURE — 36415 COLL VENOUS BLD VENIPUNCTURE: CPT

## 2019-01-01 PROCEDURE — 82947 ASSAY GLUCOSE BLOOD QUANT: CPT

## 2019-01-01 PROCEDURE — 99223 1ST HOSP IP/OBS HIGH 75: CPT | Performed by: FAMILY MEDICINE

## 2019-01-01 PROCEDURE — G8417 CALC BMI ABV UP PARAM F/U: HCPCS | Performed by: PSYCHIATRY & NEUROLOGY

## 2019-01-01 RX ORDER — PYRIDOSTIGMINE BROMIDE 60 MG/1
60 TABLET ORAL 2 TIMES DAILY
Status: DISCONTINUED | OUTPATIENT
Start: 2019-01-01 | End: 2019-01-01 | Stop reason: HOSPADM

## 2019-01-01 RX ORDER — DIPHENHYDRAMINE HYDROCHLORIDE 50 MG/ML
INJECTION INTRAMUSCULAR; INTRAVENOUS
Status: COMPLETED
Start: 2019-01-01 | End: 2019-01-01

## 2019-01-01 RX ORDER — SODIUM CHLORIDE, SODIUM LACTATE, POTASSIUM CHLORIDE, AND CALCIUM CHLORIDE .6; .31; .03; .02 G/100ML; G/100ML; G/100ML; G/100ML
2000 INJECTION, SOLUTION INTRAVENOUS ONCE
Status: COMPLETED | OUTPATIENT
Start: 2019-01-01 | End: 2019-01-01

## 2019-01-01 RX ORDER — PREDNISONE 20 MG/1
20 TABLET ORAL DAILY
Status: DISCONTINUED | OUTPATIENT
Start: 2019-01-01 | End: 2019-01-01

## 2019-01-01 RX ORDER — SODIUM CHLORIDE 0.9 % (FLUSH) 0.9 %
10 SYRINGE (ML) INJECTION EVERY 12 HOURS SCHEDULED
Status: DISCONTINUED | OUTPATIENT
Start: 2019-01-01 | End: 2019-01-01 | Stop reason: HOSPADM

## 2019-01-01 RX ORDER — DIPHENHYDRAMINE HYDROCHLORIDE 50 MG/ML
12.5 INJECTION INTRAMUSCULAR; INTRAVENOUS ONCE
Status: COMPLETED | OUTPATIENT
Start: 2019-01-01 | End: 2019-01-01

## 2019-01-01 RX ORDER — SODIUM CHLORIDE 0.9 % (FLUSH) 0.9 %
10 SYRINGE (ML) INJECTION EVERY 12 HOURS SCHEDULED
Status: CANCELLED | OUTPATIENT
Start: 2019-01-01

## 2019-01-01 RX ORDER — PYRIDOSTIGMINE BROMIDE 60 MG/1
90 TABLET ORAL 2 TIMES DAILY
Status: DISCONTINUED | OUTPATIENT
Start: 2019-01-01 | End: 2019-01-01 | Stop reason: HOSPADM

## 2019-01-01 RX ORDER — PYRIDOSTIGMINE BROMIDE 60 MG/1
TABLET ORAL
Qty: 150 TABLET | Refills: 5 | Status: SHIPPED | OUTPATIENT
Start: 2019-01-01

## 2019-01-01 RX ORDER — ASPIRIN 81 MG/1
TABLET ORAL
Qty: 90 TABLET | Refills: 5 | Status: SHIPPED | OUTPATIENT
Start: 2019-01-01

## 2019-01-01 RX ORDER — SIMVASTATIN 10 MG
TABLET ORAL
Qty: 90 TABLET | Refills: 3 | Status: SHIPPED | OUTPATIENT
Start: 2019-01-01

## 2019-01-01 RX ORDER — VANCOMYCIN HYDROCHLORIDE 1 G/200ML
1000 INJECTION, SOLUTION INTRAVENOUS EVERY 12 HOURS
Status: DISCONTINUED | OUTPATIENT
Start: 2019-01-01 | End: 2019-01-01 | Stop reason: HOSPADM

## 2019-01-01 RX ORDER — SODIUM CHLORIDE 0.9 % (FLUSH) 0.9 %
10 SYRINGE (ML) INJECTION PRN
Status: DISCONTINUED | OUTPATIENT
Start: 2019-01-01 | End: 2019-01-01 | Stop reason: HOSPADM

## 2019-01-01 RX ORDER — ACETAMINOPHEN 500 MG
TABLET ORAL
Qty: 120 TABLET | Refills: 1 | Status: SHIPPED | OUTPATIENT
Start: 2019-01-01

## 2019-01-01 RX ORDER — PREDNISONE 20 MG/1
20 TABLET ORAL DAILY
Status: DISCONTINUED | OUTPATIENT
Start: 2019-01-01 | End: 2019-01-01 | Stop reason: HOSPADM

## 2019-01-01 RX ORDER — VANCOMYCIN HYDROCHLORIDE 1 G/200ML
15 INJECTION, SOLUTION INTRAVENOUS ONCE
Status: COMPLETED | OUTPATIENT
Start: 2019-01-01 | End: 2019-01-01

## 2019-01-01 RX ORDER — PANTOPRAZOLE SODIUM 40 MG/10ML
40 INJECTION, POWDER, LYOPHILIZED, FOR SOLUTION INTRAVENOUS DAILY
Status: DISCONTINUED | OUTPATIENT
Start: 2019-01-01 | End: 2019-01-01 | Stop reason: HOSPADM

## 2019-01-01 RX ORDER — VANCOMYCIN HYDROCHLORIDE 1 G/200ML
1000 INJECTION, SOLUTION INTRAVENOUS EVERY 24 HOURS
Status: DISCONTINUED | OUTPATIENT
Start: 2019-01-01 | End: 2019-01-01

## 2019-01-01 RX ORDER — SODIUM CHLORIDE 0.9 % (FLUSH) 0.9 %
10 SYRINGE (ML) INJECTION PRN
Status: CANCELLED | OUTPATIENT
Start: 2019-01-01

## 2019-01-01 RX ORDER — 0.9 % SODIUM CHLORIDE 0.9 %
1000 INTRAVENOUS SOLUTION INTRAVENOUS ONCE
Status: DISCONTINUED | OUTPATIENT
Start: 2019-01-01 | End: 2019-01-01

## 2019-01-01 RX ORDER — 0.9 % SODIUM CHLORIDE 0.9 %
10 VIAL (ML) INJECTION DAILY
Status: DISCONTINUED | OUTPATIENT
Start: 2019-01-01 | End: 2019-01-01 | Stop reason: HOSPADM

## 2019-01-01 RX ORDER — SODIUM CHLORIDE 9 MG/ML
INJECTION, SOLUTION INTRAVENOUS ONCE
Status: COMPLETED | OUTPATIENT
Start: 2019-01-01 | End: 2019-01-01

## 2019-01-01 RX ORDER — PYRIDOSTIGMINE BROMIDE 60 MG/1
60 TABLET ORAL EVERY 6 HOURS SCHEDULED
Status: DISCONTINUED | OUTPATIENT
Start: 2019-01-01 | End: 2019-01-01 | Stop reason: DRUGHIGH

## 2019-01-01 RX ORDER — SODIUM HYPOCHLORITE 1.25 MG/ML
SOLUTION TOPICAL DAILY
Qty: 1 BOTTLE | Refills: 2 | Status: SHIPPED | OUTPATIENT
Start: 2019-01-01

## 2019-01-01 RX ORDER — 0.9 % SODIUM CHLORIDE 0.9 %
250 INTRAVENOUS SOLUTION INTRAVENOUS ONCE
Status: COMPLETED | OUTPATIENT
Start: 2019-01-01 | End: 2019-01-01

## 2019-01-01 RX ORDER — SIMVASTATIN 10 MG
TABLET ORAL
Qty: 90 TABLET | Refills: 3 | Status: SHIPPED | OUTPATIENT
Start: 2019-01-01 | End: 2019-01-01 | Stop reason: SDUPTHER

## 2019-01-01 RX ORDER — VANCOMYCIN HYDROCHLORIDE 1 G/200ML
1000 INJECTION, SOLUTION INTRAVENOUS EVERY 12 HOURS
Status: DISCONTINUED | OUTPATIENT
Start: 2019-01-01 | End: 2019-01-01

## 2019-01-01 RX ORDER — 0.9 % SODIUM CHLORIDE 0.9 %
250 INTRAVENOUS SOLUTION INTRAVENOUS ONCE
Status: DISCONTINUED | OUTPATIENT
Start: 2019-01-01 | End: 2019-01-01

## 2019-01-01 RX ORDER — ONDANSETRON 2 MG/ML
4 INJECTION INTRAMUSCULAR; INTRAVENOUS EVERY 6 HOURS PRN
Status: DISCONTINUED | OUTPATIENT
Start: 2019-01-01 | End: 2019-01-01 | Stop reason: HOSPADM

## 2019-01-01 RX ORDER — 0.9 % SODIUM CHLORIDE 0.9 %
500 INTRAVENOUS SOLUTION INTRAVENOUS ONCE
Status: COMPLETED | OUTPATIENT
Start: 2019-01-01 | End: 2019-01-01

## 2019-01-01 RX ORDER — SODIUM CHLORIDE 0.9 % (FLUSH) 0.9 %
10 SYRINGE (ML) INJECTION EVERY 12 HOURS SCHEDULED
Status: DISCONTINUED | OUTPATIENT
Start: 2019-01-01 | End: 2019-01-01

## 2019-01-01 RX ORDER — SODIUM CHLORIDE 0.9 % (FLUSH) 0.9 %
10 SYRINGE (ML) INJECTION EVERY 12 HOURS
Status: DISCONTINUED | OUTPATIENT
Start: 2019-01-01 | End: 2019-01-01

## 2019-01-01 RX ORDER — ACETAMINOPHEN 325 MG/1
650 TABLET ORAL EVERY 4 HOURS PRN
Status: CANCELLED | OUTPATIENT
Start: 2019-01-01

## 2019-01-01 RX ORDER — SODIUM CHLORIDE, SODIUM LACTATE, POTASSIUM CHLORIDE, AND CALCIUM CHLORIDE .6; .31; .03; .02 G/100ML; G/100ML; G/100ML; G/100ML
1000 INJECTION, SOLUTION INTRAVENOUS ONCE
Status: COMPLETED | OUTPATIENT
Start: 2019-01-01 | End: 2019-01-01

## 2019-01-01 RX ORDER — SODIUM CHLORIDE 9 MG/ML
INJECTION, SOLUTION INTRAVENOUS CONTINUOUS
Status: DISCONTINUED | OUTPATIENT
Start: 2019-01-01 | End: 2019-01-01 | Stop reason: HOSPADM

## 2019-01-01 RX ADMIN — Medication 10 ML: at 10:04

## 2019-01-01 RX ADMIN — PREDNISONE 20 MG: 20 TABLET ORAL at 09:58

## 2019-01-01 RX ADMIN — Medication 10 ML: at 10:02

## 2019-01-01 RX ADMIN — Medication 10 ML: at 20:31

## 2019-01-01 RX ADMIN — DIPHENHYDRAMINE HYDROCHLORIDE 12.5 MG: 50 INJECTION, SOLUTION INTRAMUSCULAR; INTRAVENOUS at 01:31

## 2019-01-01 RX ADMIN — SODIUM CHLORIDE: 9 INJECTION, SOLUTION INTRAVENOUS at 20:28

## 2019-01-01 RX ADMIN — LIDOCAINE HYDROCHLORIDE 1 ML: 20 JELLY TOPICAL at 15:12

## 2019-01-01 RX ADMIN — PIPERACILLIN SODIUM,TAZOBACTAM SODIUM 2.25 G: 2; .25 INJECTION, POWDER, FOR SOLUTION INTRAVENOUS at 21:40

## 2019-01-01 RX ADMIN — SODIUM CHLORIDE, POTASSIUM CHLORIDE, SODIUM LACTATE AND CALCIUM CHLORIDE 2000 ML: 600; 310; 30; 20 INJECTION, SOLUTION INTRAVENOUS at 18:16

## 2019-01-01 RX ADMIN — PANTOPRAZOLE SODIUM 40 MG: 40 INJECTION, POWDER, FOR SOLUTION INTRAVENOUS at 11:15

## 2019-01-01 RX ADMIN — SODIUM CHLORIDE, PRESERVATIVE FREE 10 ML: 5 INJECTION INTRAVENOUS at 18:26

## 2019-01-01 RX ADMIN — PIPERACILLIN AND TAZOBACTAM 3.38 G: 3; .375 INJECTION, POWDER, LYOPHILIZED, FOR SOLUTION INTRAVENOUS; PARENTERAL at 17:18

## 2019-01-01 RX ADMIN — ONDANSETRON 4 MG: 2 INJECTION INTRAMUSCULAR; INTRAVENOUS at 01:05

## 2019-01-01 RX ADMIN — VANCOMYCIN HYDROCHLORIDE 1000 MG: 1 INJECTION, SOLUTION INTRAVENOUS at 18:10

## 2019-01-01 RX ADMIN — PYRIDOSTIGMINE BROMIDE 120 MG: 60 TABLET ORAL at 09:59

## 2019-01-01 RX ADMIN — Medication 2 MCG/MIN: at 13:48

## 2019-01-01 RX ADMIN — DIPHENHYDRAMINE HYDROCHLORIDE 12.5 MG: 50 INJECTION INTRAMUSCULAR; INTRAVENOUS at 02:24

## 2019-01-01 RX ADMIN — Medication 10 ML: at 20:29

## 2019-01-01 RX ADMIN — COLLAGENASE SANTYL: 250 OINTMENT TOPICAL at 11:15

## 2019-01-01 RX ADMIN — SODIUM CHLORIDE 500 ML: 9 INJECTION, SOLUTION INTRAVENOUS at 12:43

## 2019-01-01 RX ADMIN — PIPERACILLIN SODIUM,TAZOBACTAM SODIUM 2.25 G: 2; .25 INJECTION, POWDER, FOR SOLUTION INTRAVENOUS at 05:00

## 2019-01-01 RX ADMIN — SODIUM CHLORIDE: 9 INJECTION, SOLUTION INTRAVENOUS at 13:50

## 2019-01-01 RX ADMIN — LIDOCAINE HYDROCHLORIDE 5 ML/ML: 20 JELLY TOPICAL at 15:32

## 2019-01-01 RX ADMIN — SODIUM CHLORIDE, POTASSIUM CHLORIDE, SODIUM LACTATE AND CALCIUM CHLORIDE 1000 ML: 600; 310; 30; 20 INJECTION, SOLUTION INTRAVENOUS at 16:33

## 2019-01-01 RX ADMIN — SODIUM CHLORIDE 250 ML: 9 INJECTION, SOLUTION INTRAVENOUS at 12:15

## 2019-01-01 RX ADMIN — DIPHENHYDRAMINE HYDROCHLORIDE 12.5 MG: 50 INJECTION, SOLUTION INTRAMUSCULAR; INTRAVENOUS at 02:24

## 2019-01-01 RX ADMIN — VANCOMYCIN HYDROCHLORIDE 1000 MG: 1 INJECTION, SOLUTION INTRAVENOUS at 09:59

## 2019-01-01 RX ADMIN — PANTOPRAZOLE SODIUM 40 MG: 40 INJECTION, POWDER, FOR SOLUTION INTRAVENOUS at 21:45

## 2019-01-01 RX ADMIN — SODIUM CHLORIDE: 9 INJECTION, SOLUTION INTRAVENOUS at 13:20

## 2019-01-01 RX ADMIN — CALCIUM GLUCONATE 2 G: 98 INJECTION, SOLUTION INTRAVENOUS at 10:05

## 2019-01-01 RX ADMIN — Medication 10 ML: at 10:03

## 2019-01-01 RX ADMIN — PYRIDOSTIGMINE BROMIDE 60 MG: 60 TABLET ORAL at 21:36

## 2019-01-01 RX ADMIN — LIDOCAINE HYDROCHLORIDE 10 ML: 20 JELLY TOPICAL at 13:55

## 2019-01-01 SDOH — HEALTH STABILITY: MENTAL HEALTH
STRESS IS WHEN SOMEONE FEELS TENSE, NERVOUS, ANXIOUS, OR CAN'T SLEEP AT NIGHT BECAUSE THEIR MIND IS TROUBLED. HOW STRESSED ARE YOU?: ONLY A LITTLE

## 2019-01-01 SDOH — HEALTH STABILITY: PHYSICAL HEALTH: ON AVERAGE, HOW MANY DAYS PER WEEK DO YOU ENGAGE IN MODERATE TO STRENUOUS EXERCISE (LIKE A BRISK WALK)?: 0 DAYS

## 2019-01-01 ASSESSMENT — PAIN DESCRIPTION - LOCATION
LOCATION: BUTTOCKS;FOOT
LOCATION: OTHER (COMMENT)

## 2019-01-01 ASSESSMENT — ENCOUNTER SYMPTOMS
SHORTNESS OF BREATH: 0
EYE REDNESS: 0
SORE THROAT: 0
NAUSEA: 0
COUGH: 0
DIARRHEA: 0
VOMITING: 0
COUGH: 0
EYE ITCHING: 0
BACK PAIN: 1
VOMITING: 0
ABDOMINAL PAIN: 0
RHINORRHEA: 0
CONSTIPATION: 0
DIARRHEA: 0
BACK PAIN: 0
ABDOMINAL PAIN: 0
SHORTNESS OF BREATH: 0
NAUSEA: 0

## 2019-01-01 ASSESSMENT — PAIN DESCRIPTION - DESCRIPTORS
DESCRIPTORS: BURNING;ACHING
DESCRIPTORS: STABBING

## 2019-01-01 ASSESSMENT — PAIN SCALES - GENERAL
PAINLEVEL_OUTOF10: 6
PAINLEVEL_OUTOF10: 8
PAINLEVEL_OUTOF10: 7
PAINLEVEL_OUTOF10: 0
PAINLEVEL_OUTOF10: 5
PAINLEVEL_OUTOF10: 0

## 2019-01-01 ASSESSMENT — PAIN DESCRIPTION - PAIN TYPE
TYPE: CHRONIC PAIN
TYPE: CHRONIC PAIN

## 2019-01-01 ASSESSMENT — PULMONARY FUNCTION TESTS: PIF_VALUE: 14

## 2019-02-06 PROBLEM — L89.41: Status: ACTIVE | Noted: 2019-01-01

## 2019-02-08 PROBLEM — Z74.09 IMMOBILITY: Status: RESOLVED | Noted: 2017-06-01 | Resolved: 2019-01-01

## 2019-02-08 PROBLEM — H02.409 PTOSIS: Status: RESOLVED | Noted: 2017-06-01 | Resolved: 2019-01-01

## 2019-02-08 PROBLEM — R47.1 DYSARTHRIA: Status: RESOLVED | Noted: 2017-06-01 | Resolved: 2019-01-01

## 2019-02-08 PROBLEM — R13.10 DYSPHAGIA: Status: RESOLVED | Noted: 2017-06-01 | Resolved: 2019-01-01

## 2019-02-20 PROBLEM — L89.41: Status: RESOLVED | Noted: 2019-01-01 | Resolved: 2019-01-01

## 2019-02-20 PROBLEM — L89.152 PRESSURE ULCER OF COCCYGEAL REGION, STAGE II (HCC): Status: RESOLVED | Noted: 2018-01-01 | Resolved: 2019-01-01

## 2019-03-06 PROBLEM — L89.512 PRESSURE ULCER OF RIGHT ANKLE, STAGE 2 (HCC): Status: ACTIVE | Noted: 2019-01-01

## 2019-03-06 PROBLEM — L89.622 STAGE II PRESSURE ULCER OF LEFT HEEL (HCC): Status: ACTIVE | Noted: 2019-01-01

## 2019-03-06 PROBLEM — L89.622 PRESSURE ULCER OF LEFT HEEL, STAGE 2 (HCC): Status: RESOLVED | Noted: 2018-01-01 | Resolved: 2019-01-01

## 2019-05-01 PROBLEM — L89.42 PRESSURE INJURY OF CONTIGUOUS REGION INVOLVING BACK AND RIGHT BUTTOCK, STAGE 2 (HCC): Status: ACTIVE | Noted: 2019-01-01

## 2019-05-01 NOTE — PROGRESS NOTES
changes in weight  HEME/LYMPH: no history malignancy, denies lymphatic swelling, no history of easy bruising or bleeding. NEURO: Denies headache, double vision, gait abnormalities  PSYCH: Denies suicidal or homicidal ideations    Review of systems negative unless above. Physical exam  Vital Signs: Blood pressure 109/62, pulse 90, temperature 98.6 °F (37 °C), temperature source Oral, resp. rate 18, height 5' 3\" (1.6 m), weight 140 lb (63.5 kg).     CONSTITUTIONAL:  Alert and oriented, no acute distress  HEAD: normocephalic, atraumatic  EYES: Pupils equal and reactive to light, Extraocular muscles intact, sclera non icteric  ENT: Mucus membranes moist, No otorrhea, no rhinorrhea  NECK:  supple, symmetrical, trachea midline   LUNGS:  Good air movement bilaterally, unlabored respirations, no wheezes or rhonchi  CARDIOVASCULAR: Regular rate and rhythm, no murmurs rubs or gallops  ABDOMEN: soft, non tender, non distended, no rebound or guarding, no hernias, no hepatomegaly, no splenomegaly  : normal external  genitalia  MUSCULOSKELETAL:  Equal strength bilaterally, normal muscle tone  SKIN:  See wound assessment  NEUROLOGIC:  Cranial nerves 2-12 grossly intact, no focal deficits  PSYCH: affect appropriate          Wound Assessment:    Wound 12/19/18 Plantar WCC #2 Left heel  (Active)   Wound Image   3/6/2019  1:40 PM   Wound Pressure Stage  2 12/19/2018  2:58 PM   Dressing Changed Changed/New 2/20/2019  2:36 PM   Dressing/Treatment Other (comment) 3/27/2019  2:13 PM   Wound Cleansed Rinsed/Irrigated with saline 5/1/2019  3:21 PM   Wound Length (cm) 0.4 cm 5/1/2019  3:21 PM   Wound Width (cm) 0.4 cm 5/1/2019  3:21 PM   Wound Depth (cm) 0.2 cm 5/1/2019  3:21 PM   Wound Surface Area (cm^2) 0.16 cm^2 5/1/2019  3:21 PM   Change in Wound Size % (l*w) 93.33 5/1/2019  3:21 PM   Wound Volume (cm^3) 0.03 cm^3 5/1/2019  3:21 PM   Wound Healing % 88 5/1/2019  3:21 PM   Post-Procedure Length (cm) 0.9 cm 2/20/2019  2:36 PM Post-Procedure Width (cm) 1.3 cm 2/20/2019  2:36 PM   Post-Procedure Depth (cm) 1.1 cm 2/20/2019  2:36 PM   Post-Procedure Surface Area (cm^2) 1.17 cm^2 2/20/2019  2:36 PM   Post-Procedure Volume (cm^3) 1.29 cm^3 2/20/2019  2:36 PM   Distance Tunneling (cm) 0 cm 2/20/2019  2:36 PM   Wound Assessment Epithelialization;Granulation tissue; Red 3/6/2019  1:40 PM   Drainage Amount Scant 5/1/2019  3:21 PM   Drainage Description Serosanguinous 5/1/2019  3:21 PM   Odor None 5/1/2019  3:21 PM   Margins Epibole (rolled edges) 5/1/2019  3:21 PM   Melyssa-wound Assessment Painful;Maceration 3/27/2019  1:29 PM   Non-staged Wound Description Full thickness 3/27/2019  1:29 PM   Reagan%Wound Bed 90 5/1/2019  3:21 PM   Yellow%Wound Bed 90 2/20/2019  2:36 PM   Other%Wound Bed 10 5/1/2019  3:21 PM       Wound 03/06/19 Ankle Right;Lateral WCC3# Right Lateral Malleoulus (Active)   Wound Image   3/6/2019  1:40 PM   Dressing/Treatment Other (comment) 3/27/2019  2:13 PM   Wound Cleansed Rinsed/Irrigated with saline 5/1/2019  3:21 PM   Wound Length (cm) 0.5 cm 5/1/2019  3:21 PM   Wound Width (cm) 0.5 cm 5/1/2019  3:21 PM   Wound Depth (cm) 0.3 cm 5/1/2019  3:21 PM   Wound Surface Area (cm^2) 0.25 cm^2 5/1/2019  3:21 PM   Change in Wound Size % (l*w) 77.27 5/1/2019  3:21 PM   Wound Volume (cm^3) 0.08 cm^3 5/1/2019  3:21 PM   Wound Healing % 76 5/1/2019  3:21 PM   Post-Procedure Length (cm) 1 cm 3/6/2019  2:04 PM   Post-Procedure Width (cm) 1.1 cm 3/6/2019  2:04 PM   Post-Procedure Depth (cm) 0.5 cm 3/6/2019  2:04 PM   Post-Procedure Surface Area (cm^2) 1.1 cm^2 3/6/2019  2:04 PM   Post-Procedure Volume (cm^3) 0.55 cm^3 3/6/2019  2:04 PM   Wound Assessment Fibrin 3/27/2019  1:29 PM   Drainage Amount Scant 5/1/2019  3:21 PM   Odor None 5/1/2019  3:21 PM   Melyssa-wound Assessment Dry 5/1/2019  3:21 PM   Non-staged Wound Description Full thickness 5/1/2019  3:21 PM   Reagan%Wound Bed 80 5/1/2019  3:21 PM   Red%Wound Bed 20 5/1/2019  3:21 PM Yellow%Wound Bed 100 3/6/2019  1:40 PM       Wound 05/01/19 Buttocks Right C #4 RIGHT BUTTOCKS  (Active)   Wound Image   5/1/2019  4:04 PM   Wound Pressure Stage  2 5/1/2019  3:54 PM   Dressing/Treatment Other (comment) 5/1/2019  3:54 PM   Wound Length (cm) 0.7 cm 5/1/2019  3:54 PM   Wound Width (cm) 0.7 cm 5/1/2019  3:54 PM   Wound Depth (cm) 0.1 cm 5/1/2019  3:54 PM   Wound Surface Area (cm^2) 0.49 cm^2 5/1/2019  3:54 PM   Wound Volume (cm^3) 0.05 cm^3 5/1/2019  3:54 PM   Wound Assessment Fibrin 5/1/2019  3:54 PM   Drainage Amount None 5/1/2019  3:54 PM   Odor None 5/1/2019  3:54 PM   Margins Attached edges; Defined edges 5/1/2019  3:54 PM   Non-staged Wound Description Partial thickness 5/1/2019  3:54 PM   Yellow%Wound Bed 100 5/1/2019  3:54 PM            Assessment:   1. Stage II pressure ulcer of left heel [L89.622]  2. Pressure ulcer of right ankle, stage 2 [L89.512]  3. Pressure injury of contiguous region involving back and right buttock, stage 2 [L89.42]    Discussion: The patient's pressure ulcers on the left heel and right ankle are smaller. Follow-up to a new pressure ulcer stage II on the right buttocks coccyx area. This is due to her myasthenia and that she is very immobile. She does have a foam protection for both lower legs. She would benefit from a hospital bed with a nonpressure mattress as she be cannot be  repositioned during the night. Plan:   1. Continue Dakin's moist dressings 2 times a day to all pressure wounds  2. Request a hospital bed for use at home     This note was created with the assistance of a speech-recognition program.  Although the intention is to generate a document that actually reflects the content of the visit, no guarantees can be provided that every mistake has been identified and corrected by editing.

## 2019-05-01 NOTE — DISCHARGE INSTR - COC
Continuity of Care Form    Patient Name: Calixto Fajardo   :  1940  MRN:  [id number]    Admit date:  2019  Discharge date:  ***    Code Status Order: Prior   Advance Directives:     Admitting Physician:  No admitting provider for patient encounter. PCP: Antonella Morales MD    Discharging Nurse: [facility] Unit/Room#: No information available for this encounter.   Discharging Unit Phone Number: ***    Emergency Contact:   Extended Emergency Contact Information  Primary Emergency Contact: Jayce Packer [address] Phone: [phone]  Relation: Child  Secondary Emergency Contact: Marti Servin   [address] Phone: [phone]  Relation: Child    Past Surgical History:  Past Surgical History:   Procedure Laterality Date    CERVICAL SPINE SURGERY      COLONOSCOPY      EYE SURGERY      LUMBAR SPINE SURGERY         Immunization History:   Immunization History   Administered Date(s) Administered    Pneumococcal Polysaccharide (Ezqvcshyr86) 2005       Active Problems:  Patient Active Problem List   Diagnosis Code    PVD (peripheral vascular disease) ([facility] I73.9    Microcytic anemia D50.9    Cervical spondylosis M47.812    Lung nodule R91.1    Essential hypertension I10    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    HLD (hyperlipidemia) E78.5    Prediabetes R73.03    COPD (chronic obstructive pulmonary disease) ([address] J44.9    Centrilobular emphysema ([address] J43.2    Myasthenia ([address] G70.00    Lower paraplegia ([address] G82.20    Hypotension due to drugs I95.2    Pressure ulcer of right ankle, stage 2 L89.512    Stage II pressure ulcer of left heel L89.622    Pressure injury of contiguous region involving back and right buttock, stage 2 L89.42       Isolation/Infection:   Isolation          No Isolation            Nurse Assessment:  Last Vital Signs: /62   Pulse 90   Temp 98.6 °F (37 °C) (Oral)   Resp 18   Ht 5' 3\" (1.6 m)   Wt 140 lb (63.5 kg) BMI 24.80 kg/m²     Last documented pain score (0-10 scale): Pain Level: 6  Last Weight:   Wt Readings from Last 1 Encounters:   05/01/19 140 lb (63.5 kg)     Mental Status:  {IP PT MENTAL STATUS:13928}    IV Access:  [address] FAUSTINO IV ACCESS:578619216}    Nursing Mobility/ADLs:  Walking   {CHP DME AUEL:423542018}  Transfer  {CHP DME AOUM:506874612}  Bathing  {CHP DME JDPU:641421491}  Dressing  {CHP DME IJSC:729811606}  Toileting  {CHP DME GXSI:629108881}  Feeding  {CHP DME WEIB:904763484}  Med Admin  {CHP DME IJTT:069961286}  Med Delivery   { AFUSTINO MED Delivery:880720511}    Wound Care Documentation and Therapy:  Wound 12/19/18 Plantar [facility] #2 Left heel  (Active)   Wound Image   3/6/2019  1:40 PM   Wound Pressure Stage  2 12/19/2018  2:58 PM   Dressing Changed Changed/New 2/20/2019  2:36 PM   Dressing/Treatment Other (comment) 3/27/2019  2:13 PM   Wound Cleansed Rinsed/Irrigated with saline 5/1/2019  3:21 PM   Wound Length (cm) 0.4 cm 5/1/2019  3:21 PM   Wound Width (cm) 0.4 cm 5/1/2019  3:21 PM   Wound Depth (cm) 0.2 cm 5/1/2019  3:21 PM   Wound Surface Area (cm^2) 0.16 cm^2 5/1/2019  3:21 PM   Change in Wound Size % (l*w) 93.33 5/1/2019  3:21 PM   Wound Volume (cm^3) 0.03 cm^3 5/1/2019  3:21 PM   Wound Healing % 88 5/1/2019  3:21 PM   Post-Procedure Length (cm) 0.9 cm 2/20/2019  2:36 PM   Post-Procedure Width (cm) 1.3 cm 2/20/2019  2:36 PM   Post-Procedure Depth (cm) 1.1 cm 2/20/2019  2:36 PM   Post-Procedure Surface Area (cm^2) 1.17 cm^2 2/20/2019  2:36 PM   Post-Procedure Volume (cm^3) 1.29 cm^3 2/20/2019  2:36 PM   Distance Tunneling (cm) 0 cm 2/20/2019  2:36 PM   Wound Assessment Epithelialization;Granulation tissue; Red 3/6/2019  1:40 PM   Drainage Amount Scant 5/1/2019  3:21 PM   Drainage Description Serosanguinous 5/1/2019  3:21 PM   Odor None 5/1/2019  3:21 PM   Margins Epibole (rolled edges) 5/1/2019  3:21 PM   Melyssa-wound Assessment Painful;Maceration 3/27/2019  1:29 PM   Non-staged Wound Description Full Attached edges; Defined edges 2019  3:54 PM   Non-staged Wound Description Partial thickness 2019  3:54 PM   Yellow%Wound Bed 100 2019  3:54 PM   Number of days: 0        Elimination:  Continence:   · Bowel: {YES / WM:03954}  · Bladder: {YES / VO:85994}  Urinary Catheter: {Urinary Catheter:114738960}   Colostomy/Ileostomy/Ileal Conduit: {YES / LD:00524}       Date of Last BM: ***  No intake or output data in the 24 hours ending 19 1611  No intake/output data recorded.     Safety Concerns:     [address] Safety Concerns:846215196}    Impairments/Disabilities:      [address] Impairments/Disabilities:853998104}    Nutrition Therapy:  Current Nutrition Therapy:   [address] Diet List:095517328}    Routes of Feeding: {CHP DME Other Feedings:841910630}  Liquids: {Slp liquid thickness:30960}  Daily Fluid Restriction: {CHP DME Yes amt example:496177636}  Last Modified Barium Swallow with Video (Video Swallowing Test): {Done Not Done PPRA:898817982}    Treatments at the Time of Hospital Discharge:   Respiratory Treatments: ***  Oxygen Therapy:  {Therapy; copd oxygen:44014}  Ventilator:    { CC Vent UVYO:389384885}    Rehab Therapies: {THERAPEUTIC INTERVENTION:5786294252}  Weight Bearing Status/Restrictions: [address] [employer] Weight Bearin}  Other Medical Equipment (for information only, NOT a DME order):  {EQUIPMENT:479740717}  Other Treatments: ***    Patient's personal belongings (please select all that are sent with patient):  {CHP DME Belongings:290349538}    RN SIGNATURE:  {Esignature:133394456}    CASE MANAGEMENT/SOCIAL WORK SECTION    Inpatient Status Date: ***    Readmission Risk Assessment Score:  Readmission Risk              Risk of Unplanned Readmission:        0           Discharging to Facility/ Agency   · Name:   · Address:  · Phone:  · Fax:    Dialysis Facility (if applicable)   · Name:  · Address:  · Dialysis Schedule:  · Phone:  · Fax:    / signature: {Esignature:523092048}    PHYSICIAN SECTION    Prognosis: {Prognosis:8106370198}    Condition at Discharge: [address] [place] Patient Condition:804155428}    Rehab Potential (if transferring to Rehab): {Prognosis:4161585711}    Recommended Labs or Other Treatments After Discharge: ***    Physician Certification: I certify the above information and transfer of Ash Newman  is necessary for the continuing treatment of the diagnosis listed and that she requires {Admit to Appropriate Level of Care:72245} for {GREATER/LESS:891036035} 30 days.      Update Admission H&P: {CHP DME Changes in TKXVM:204408191}    PHYSICIAN SIGNATURE:  {Esignature:782467746}

## 2019-05-14 NOTE — TELEPHONE ENCOUNTER
Message from Tremaine Toney sent at 5/14/2019  3:04 PM EDT     Summary: bed sores, diarrhea    Rick Weems is the daughter of patient calling stating patient has bed sores, diarrhea            Call History      Type Contact Phone User   05/14/2019 03:03 PM Phone (Incoming) Rick Weems [phone] Tremaine Toney       Reason for Disposition   MILD-MODERATE diarrhea (e.g., 1-6 times / day more than normal)    Answer Assessment - Initial Assessment Questions  1. DIARRHEA SEVERITY: \"How bad is the diarrhea? \" \"How many extra stools have you had in the past 24 hours than normal?\"     - MILD: Few loose or mushy BMs; increase of 1-3 stools over normal daily number of stools; mild increase in ostomy output. - MODERATE: Increase of 4-6 stools daily over normal; moderate increase in ostomy output.    - SEVERE (or Worst Possible): Increase of 7 or more stools daily over normal; moderate increase in ostomy output; incontinence. 2-3 per day over the normal  Bedsores addressed at the wound clinic,  2. ONSET: \"When did the diarrhea begin? \"       4-5 days ago  3. BM CONSISTENCY: \"How loose or watery is the diarrhea? \"       Loose  4. VOMITING: \"Are you also vomiting? \" If so, ask: \"How many times in the past 24 hours? \"       no  5. ABDOMINAL PAIN: Alcira Needle you having any abdominal pain? \" If yes: \"What does it feel like? \" (e.g., crampy, dull, intermittent, constant)       cramping  6. ABDOMINAL PAIN SEVERITY: If present, ask: \"How bad is the pain? \"  (e.g., Scale 1-10; mild, moderate, or severe)     - MILD (1-3): doesn't interfere with normal activities, abdomen soft and not tender to touch      - MODERATE (4-7): interferes with normal activities or awakens from sleep, tender to touch      - SEVERE (8-10): excruciating pain, doubled over, unable to do any normal activities       Not sure-always moaning  7. ORAL INTAKE: If vomiting, \"Have you been able to drink liquids? \" \"How much fluids have you had in the past 24 hours? \"      fluids  8.  HYDRATION: \"Any signs of dehydration? \" (e.g., dry mouth [not just dry lips], too weak to stand, dizziness, new weight loss) \"When did you last urinate? \"      none  9. EXPOSURE: \"Have you traveled to a foreign country recently? \" \"Have you been exposed to anyone with diarrhea? \" \"Could you have eaten any food that was spoiled? \"     none  10. ANTIBIOTIC USE: \"Are you taking antibiotics now or have you taken antibiotics in the past 2 months? \"        none  11. OTHER SYMPTOMS: \"Do you have any other symptoms? \" (e.g., fever, blood in stool)        none  12. PREGNANCY: \"Is there any chance you are pregnant? \" \"When was your last menstrual period? \"        none    Protocols used: DIARRHEA-ADULT-

## 2019-05-23 PROBLEM — D64.9 SEVERE ANEMIA: Status: ACTIVE | Noted: 2019-01-01

## 2019-05-23 PROBLEM — R57.9 SHOCK (HCC): Status: ACTIVE | Noted: 2019-01-01

## 2019-05-23 NOTE — ED PROVIDER NOTES
[address] [facility]  ED  Emergency Department Encounter  EmergencyMedicine Resident     Pt Name:Augusta Medellin  MRN: [id number]  [place] 1940  Date of evaluation: 5/23/19  PCP:  Yyaa Gar MD    CHIEF COMPLAINT       Chief Complaint   Patient presents with    Wound Infection       HISTORY OF PRESENT ILLNESS  (Location/Symptom, Timing/Onset, Context/Setting, Quality, Duration, Modifying Factors, Severity.)      Carl Sheets is a [de-identified] y.o. female who presents with worsening sacral decubitus ulcer. Patient gets daily wound care, lives with her daughter. She has been having a progression of decreased appetite and weight loss. Sent to emergency Department for deepening of the ulcer as well as expansion. She'll addition has ulcerations of her bilateral heels. Decreased mobility at baseline, has a history of myasthenia gravis. PAST MEDICAL / SURGICAL / SOCIAL / FAMILY HISTORY      has a past medical history of . ............., Anemia, Ankle fracture, Cervical spondylosis, COPD (chronic obstructive pulmonary disease) ([address] Decubitus ulcer, Hepatomegaly, HTN (hypertension), Hyperlipemia, Lung nodule, Myasthenia ([address] Osteoarthritis, Prediabetes, PVD (peripheral vascular disease) ([address] and Urinary incontinence. has a past surgical history that includes Colonoscopy; Eye surgery; Cervical spine surgery; and Lumbar spine surgery. Social History     Socioeconomic History    Marital status:       Spouse name: Not on file    Number of children: Not on file    Years of education: Not on file    Highest education level: Not on file   Occupational History    Not on file   Social Needs    Financial resource strain: Not on file    Food insecurity:     Worry: Not on file     Inability: Not on file    Transportation needs:     Medical: Not on file     Non-medical: Not on file   Tobacco Use    Smoking status: Former Smoker     Years: 30.00     Last attempt to quit: 1/1/1985     Years since quittin.4    Smokeless tobacco: Never Used   Substance and Sexual Activity    Alcohol use: No     Alcohol/week: 0.0 oz    Drug use: No    Sexual activity: Not on file   Lifestyle    Physical activity:     Days per week: 0 days     Minutes per session: Not on file    Stress: Only a little   Relationships    Social connections:     Talks on phone: Not on file     Gets together: Not on file     Attends Buddhism service: Not on file     Active member of club or organization: Not on file     Attends meetings of clubs or organizations: Not on file     Relationship status: Not on file    Intimate partner violence:     Fear of current or ex partner: Not on file     Emotionally abused: Not on file     Physically abused: Not on file     Forced sexual activity: Not on file   Other Topics Concern    Not on file   Social History Narrative    Not on file       Family History   Problem Relation Age of Onset    Heart Attack Mother     High Blood Pressure Other        Allergies:  Patient has no known allergies. Home Medications:  Prior to Admission medications    Medication Sig Start Date End Date Taking?  Authorizing Provider   sodium hypochlorite (DAKINS) 0.125 % SOLN external solution Apply topically daily Apply 2 times a day 19   Nelson Hopper MD   predniSONE (DELTASONE) 20 MG tablet take 1 tablet by mouth once daily 4/10/19   Priya Song MD   pyridostigmine (MESTINON) 60 MG tablet 90 mg in the morning, 60 mg afternoon, 90 mg at dinnertime and 60 mg at bedtime 19   Priya Song MD   simvastatin (ZOCOR) 10 MG tablet take 1 tablet by mouth every evening 19   Genna Gonzalez MD   aspirin (RA ASPIRIN EC) 81 MG EC tablet take 1 tablet by mouth once daily 19   Genna Gonzalez MD   RA ACETAMINOPHEN EX  MG tablet take 2 tablets by mouth every 6 hours if needed for pain 19   Genna Gonzalez MD   sodium hypochlorite (DAKINS) 0.125 % SOLN external solution do moist dressing changes 2 times a day to the left heel 12/19/18   Anju Barclay MD   Handicap Placard MISC by Does not apply route 12 months 11/13/18   Vanesa Rodriguez MD   vitamin D (CHOLECALCIFEROL) 1000 UNIT TABS tablet Take 1 tablet by mouth daily 8/1/16   Shine Malloy MD       REVIEW OF SYSTEMS    (2-9 systems for level 4, 10 or more for level 5)      Review of Systems   Constitutional: Negative for chills and fever. HENT: Negative for congestion and rhinorrhea. Eyes: Negative for redness and itching. Respiratory: Negative for cough and shortness of breath. Cardiovascular: Negative for chest pain, palpitations and leg swelling. Gastrointestinal: Negative for abdominal pain, diarrhea, nausea and vomiting. Genitourinary: Negative for dysuria and frequency. Musculoskeletal: Positive for back pain. Negative for joint swelling and myalgias. Skin: Negative for pallor and rash. Neurological: Negative for dizziness, weakness, light-headedness, numbness and headaches. PHYSICAL EXAM   (up to 7 for level 4, 8 or more for level 5)      INITIAL VITALS:   BP (!) 100/56   Pulse 88   Temp 96.3 °F (35.7 °C) (Rectal)   Resp 16   Ht 4' 11\" (1.499 m)   Wt 130 lb (59 kg)   SpO2 100%   BMI 26.26 kg/m²     Physical Exam   Constitutional: She is oriented to person, place, and time. Frail appearing [de-identified] woman   HENT:   Head: Normocephalic and atraumatic. Eyes: Pupils are equal, round, and reactive to light. EOM are normal.   Cardiovascular: Normal rate, regular rhythm and normal heart sounds. Pulmonary/Chest: Effort normal and breath sounds normal. No respiratory distress. Abdominal: Soft. Bowel sounds are normal. She exhibits no distension. There is no tenderness. Musculoskeletal: Normal range of motion. Neurological: She is alert and oriented to person, place, and time. Skin: Skin is warm. There is pallor.                   DIFFERENTIAL  DIAGNOSIS     PLAN ([facility]ball / Ples [facility] / EKG):  Orders Placed This Encounter   Procedures    Culture Blood #1    Culture Blood #1    Urine Culture    Comprehensive Metabolic Panel    Protime-INR    Lactate, Sepsis    Urinalysis with Microscopic    APTT    SPECIMEN REJECTION    PREVIOUS SPECIMEN    SPECIMEN REJECTION    PREVIOUS SPECIMEN    Acetaminophen Level    SPECIMEN REJECTION    CBC Auto Differential    PREVIOUS SPECIMEN    Pharmacy to Dose: Vancomycin    Inpatient consult to Internal Medicine       MEDICATIONS ORDERED:  Orders Placed This Encounter   Medications    lactated ringers bolus    lactated ringers bolus    vancomycin (VANCOCIN) 1000 mg in dextrose 5% 200 mL IVPB    piperacillin-tazobactam (ZOSYN) 3.375 g in dextrose 5 % 50 mL IVPB (mini-bag)    vancomycin (VANCOCIN) intermittent dosing (placeholder)    vancomycin (VANCOCIN) 1000 mg in dextrose 5% 200 mL IVPB       DIAGNOSTIC RESULTS / EMERGENCY DEPARTMENT COURSE / MDM     LABS:  Results for orders placed or performed during the hospital encounter of 05/23/19   Comprehensive Metabolic Panel   Result Value Ref Range    Glucose 154 (H) 70 - 99 mg/dL    BUN 25 (H) 8 - 23 mg/dL    CREATININE 0.49 (L) 0.50 - 0.90 mg/dL    Bun/Cre Ratio NOT REPORTED 9 - 20    Calcium 8.9 8.6 - 10.4 mg/dL    Sodium 130 (L) 135 - 144 mmol/L    Potassium 4.5 3.7 - 5.3 mmol/L    Chloride 93 (L) 98 - 107 mmol/L    CO2 20 20 - 31 mmol/L    Anion Gap 17 9 - 17 mmol/L    Alkaline Phosphatase 209 (H) 35 - 104 U/L     (H) 5 - 33 U/L     (H) <32 U/L    Total Bilirubin 0.86 0.3 - 1.2 mg/dL    Total Protein 6.2 (L) 6.4 - 8.3 g/dL    Alb 3.4 (L) 3.5 - 5.2 g/dL    Albumin/Globulin Ratio 1.2 1.0 - 2.5    GFR Non-African American >60 >60 mL/min    GFR African American >60 >60 mL/min    GFR Comment          GFR Staging NOT REPORTED    Protime-INR   Result Value Ref Range    Protime 17.6 (H) 9.0 - 12.0 sec    INR 1.7    Lactate, Sepsis   Result Value Ref Range    Lactic Acid, Sepsis NOT REPORTED 0.5 - 1.9 mmol/L    Lactic Acid, Sepsis, Whole Blood 7.1 (H) 0.5 - 1.9 mmol/L   APTT   Result Value Ref Range    PTT 26.7 20.5 - 30.5 sec   SPECIMEN REJECTION   Result Value Ref Range    Specimen Source . BLOOD     Ordered Test CDP     Reason for Rejection       Unable to perform testing: Specimen quantity not sufficient.    - NOT REPORTED    SPECIMEN REJECTION   Result Value Ref Range    Specimen Source . BLOOD     Ordered Test CDP     Reason for Rejection Unable to perform testing: Specimen contaminated. - NOT REPORTED    Acetaminophen Level   Result Value Ref Range    Acetaminophen Level 14 10 - 30 ug/mL   SPECIMEN REJECTION   Result Value Ref Range    Specimen Source . BLOOD     Ordered Test CDP     Reason for Rejection Unable to perform testing: Specimen contaminated. - NOT REPORTED        IMPRESSION: Adwoa Lujan is a [de-identified] y.o. presenting with worsening sacral decubitus ulcer. Patient's vitals indicate low blood pressure, otherwise within normal limits. No tachycardia. Patient has no fever. There is a significant sacral wound with purulence, potentially source of sepsis. We'll obtain laboratory evaluation for sepsis. Patient does have a blood pressure that is dependent on position, Significantly when rolled to the left lateral decubitus and taken on the right arm. IV fluid rehydration, likely admission. RADIOLOGY:    EKG  None    All EKG's are interpreted by the Emergency Department Physician who either signs or Co-signs this chart in the absence of a cardiologist.    EMERGENCY DEPARTMENT COURSE:  Lactic acid elevated to 7. Significant transaminitis, Tylenol levels added. Bilirubin normal.  30 mL/kg fluid bolus ordered. Vancomycin and Zosyn ordered for antibiotic treatment, likely decubitus source. Patient signed out to Dr. Roe Gonzalez:  None    CONSULTS:  PHARMACY TO DOSE VANCOMYCIN  IP CONSULT TO INTERNAL MEDICINE    CRITICAL CARE:  None    FINAL IMPRESSION      1.  Wound infection 2. Septicemia ([facility]          DISPOSITION / PLAN     DISPOSITION Decision To Admit 05/23/2019 05:33:24 PM      PATIENT REFERRED TO:  No follow-up provider specified.     DISCHARGE MEDICATIONS:  New Prescriptions    No medications on file       Emerald Cervantes MD  Emergency Medicine Resident    (Please note that portions of thisnote were completed with a voice recognition program.  Efforts were made to edit the dictations but occasionally words are mis-transcribed.)        Emerald Cervantes MD  05/23/19 [id number]

## 2019-05-23 NOTE — ED PROVIDER NOTES
(*)      (*)     Total Protein 6.2 (*)     Alb 3.4 (*)     All other components within normal limits   PROTIME-INR - Abnormal; Notable for the following components:    Protime 17.6 (*)     All other components within normal limits   LACTATE, SEPSIS - Abnormal; Notable for the following components:    Lactic Acid, Sepsis, Whole Blood 7.1 (*)     All other components within normal limits   LACTATE, SEPSIS - Abnormal; Notable for the following components:    Lactic Acid, Sepsis, Whole Blood 8.8 (*)     All other components within normal limits   CBC WITH AUTO DIFFERENTIAL - Abnormal; Notable for the following components:    RBC 2.14 (*)     Hemoglobin 3.0 (*)     Hematocrit 13.2 (*)     MCV 61.7 (*)     MCH 14.0 (*)     MCHC 22.7 (*)     RDW 25.6 (*)     NRBC Automated 3.1 (*)     Seg Neutrophils 96 (*)     Lymphocytes 4 (*)     Monocytes 0 (*)     Eosinophils % 0 (*)     nRBC 8 (*)     Segs Absolute 8.83 (*)     Absolute Lymph # 0.37 (*)     Absolute Mono # 0.00 (*)     All other components within normal limits   IRON AND TIBC - Abnormal; Notable for the following components:    Iron 10 (*)     Iron Saturation 3 (*)     UIBC 388 (*)     All other components within normal limits   LACTIC ACID, PLASMA - Abnormal; Notable for the following components:    Lactic Acid, Whole Blood 10.7 (*)     All other components within normal limits   VITAMIN B12 & FOLATE - Abnormal; Notable for the following components:    Vitamin B-12 >2000 (*)     All other components within normal limits   CULTURE BLOOD #1   CULTURE BLOOD #1   URINE CULTURE   MRSA DNA PROBE, NASAL   APTT   SPECIMEN REJECTION   SPECIMEN REJECTION   ACETAMINOPHEN LEVEL   SPECIMEN REJECTION   IMMATURE PLATELET FRACTION   FERRITIN   TSH WITH REFLEX   TROPONIN   HEPATITIS A ANTIBODY, IGM   HEPATITIS C ANTIBODY   HEPATITIS B SURFACE ANTIGEN   URINALYSIS WITH MICROSCOPIC   PREVIOUS SPECIMEN   PREVIOUS SPECIMEN   PREVIOUS SPECIMEN   COMPREHENSIVE METABOLIC PANEL W/ REFLEX TO MG FOR LOW K   MAGNESIUM   PHOSPHORUS   TROPONIN   CBC WITH AUTO DIFFERENTIAL   LACTIC ACID, PLASMA   LACTIC ACID, PLASMA   OCCULT BLOOD SCREEN   TYPE AND SCREEN   PREPARE RBC (CROSSMATCH)   PREPARE RBC (CROSSMATCH)   PREPARE RBC (CROSSMATCH)       No results found. RECENT VITALS:     Temp: 95 °F (35 °C),  Pulse: 81, Resp: 22, BP: 104/64, SpO2: 97 %    This patient is a [de-identified] y.o. Female with wound infection, sacral decubitus ulcer. Sepsis orders pending. Planning on admission to medicine for wound care    ED Course as of May 24 0043   Thu May 23, 2019   1801 Lactic Acid, Sepsis, Whole Blood(!): 7.1 [BW]   1801 Patient's initial lactate was 7.1. Patient given 30 mL/kg of IV fluids. Her hemoglobin had be run several times as lab thought that the result was an accurate however the test was confirmed and hemoglobin was found to be 3.0. Patient's type and cross for 2 units. She started on vancomycin and Zosyn for sepsis. Hemoglobin Quant(!!): 3.0 [BW]      ED Course User Index  [BW] Gillermina Bumpers Wurst, DO     Sepsis Times and Checklist  Vital Signs: BP: 104/64  Pulse: 81  Resp: 22  Temp: 95 °F (35 °C) SpO2: 97 %  SIRS (>2)   Temp > 38.3C or < 36C   HR > 90   RR > 20   WBC > 12 or < 4 or >10% bands  SIRS (>2) and confirmed or suspected source of infection = Sepsis  Is Sepsis due to likely bacterial infection?: Yes      Sepsis Identified:  Date: 5/23   Time: 1700  Sepsis Orders:  ·  CBC: Yes  ·  CMP: Yes  ·  PT/PTT: Yes  ·  Blood Cultures x2: Yes  ·  Urinalysis and Urine Culture: Yes  ·  Lactate: Yes  ·  Broad Spectrum Antibiotics Given (within 3 hours of sepsis identification,  after blood cultures):  Yes    (If unable to obtain IV access for IV antibiotics within 3 hours of  identification of sepsis, IM antibiotics is acceptable.)  ·             If lactate >2.0 MUST repeat within 6 hours    If elevated, is elevated lactate from a likely infectious source?: No it is secondary to severe anemia.       IV Fluid Bolus:  Is lactate > 4.0:  Yes  If lactate >  4.0 OR hypotension (MAP<65 mmHg) (with 2 BP readings) 30ml/kg crystalloid MUST be ordered. Fluids must be completed within 3 hours of sepsis identification. Septic Shock Identified (Initial lactate > 4.0 or 2 Hypotensive Blood Pressure readings within the first 6 hours): For septic shock sepsis focus physical exam must be completed AND documented (within 6 hours). Date: 5/23 Time: 1700      Sepsis focus exam completed. For persistent hypotension after 30ml/kg fluid bolus vasopressors must be started (within 6 hours)    OUTSTANDING TASKS / RECOMMENDATIONS:    1. Awaiting labs  2. admitting     FINAL IMPRESSION:     1. Wound infection    2. Septicemia ([facility] [address]    3.  Anemia, unspecified type        DISPOSITION:         DISPOSITION:  []  Discharge   []  Transfer -    [x]  Admission -  medicine   []  Against Medical Advice   []  Eloped   FOLLOW-UP: Rusty Powell, [address] [facility]  [phone] [address]: Current Discharge Medication List              Shelly Diaz DO  Emergency Medicine Resident  [facility]       Shelly Diaz [place]  05/23/19 [address]  05/24/19 [id number]

## 2019-05-23 NOTE — PROGRESS NOTES
Pharmacy Note  Vancomycin Consult    Princess Vigil is a [de-identified] y.o. female started on Vancomycin for sepsis; consult received from Dr. Goldie Leos to manage therapy. Also receiving the following antibiotics: Zosyn. Patient Active Problem List   Diagnosis    PVD (peripheral vascular disease) ([facility]    Microcytic anemia    Cervical spondylosis    Lung nodule    Essential hypertension    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HCC)    HLD (hyperlipidemia)    Prediabetes    COPD (chronic obstructive pulmonary disease) (HCC)    Centrilobular emphysema (HCC)    Myasthenia (HCC)    Lower paraplegia (HCC)    Hypotension due to drugs    Pressure ulcer of right ankle, stage 2    Stage II pressure ulcer of left heel    Pressure injury of contiguous region involving back and right buttock, stage 2       Allergies:  Patient has no known allergies. Temp max: 96.3F    Recent Labs     05/23/19  1610   BUN 25*       Recent Labs     05/23/19  1610   CREATININE 0.49*       No results for input(s): WBC in the last 72 hours. No intake or output data in the 24 hours ending 05/23/19 1725    Culture Date      Source                       Results  5.23.19                UCx                            Pending  5.23.19                BCx                            Pending    Ht Readings from Last 1 Encounters:   05/23/19 4' 11\" (1.499 m)        Wt Readings from Last 1 Encounters:   05/23/19 130 lb (59 kg)         Body mass index is 26.26 kg/m². CrCl cannot be calculated (Unknown ideal weight.). Goal Trough Level: 15-20 mcg/mL    Assessment/Plan:  1) Will initiate vancomycin 1000 mg IV every 24 hours. 2) Pharmacy will follow patient renal function, Vancomycin levels and doses with you during the course of therapy. Additional recommendations will appear in follow up notes. Thank you for the consult. Bradley Tom, Pharm. [address] [facility] Resident  [phone]  5/23/2019 5:41 PM

## 2019-05-23 NOTE — H&P
Critical Care - History and Physical Examination    Patient's name:  Rachael Venegas Record Number: [id number]  Patient's account/billing number: [de-identified]  Patient's YOB: 1940  Age: [de-identified] y.o. Date of Admission: 5/23/2019  2:39 PM  Date of History and Physical Examination: 5/23/2019      Primary Care Physician: Belle Multani MD  Attending Physician:    Code Status: Prior    Chief complaint:  Worsening decubitus ulcer , sever     HISTORY OF PRESENT ILLNESS:       History was obtained from chart review and the patient. Mitchell Guzman is a [de-identified] y.o. was brought by family because of worsening new sacral decubitus ulcer, fever, chills, loss of appetite from last 1 week she has a possible history of myasthenia gravis, bedridden, severely malnourished, left heel ulcer and right lateral ankle ulcer  follows [facility]. [facility] wound care clinic, and she was using Dakin  dressing to both ulcers. Denies any melena , red blood in stool , hemoptysis , hematemesis or hematuria. on presentation she has lactic acid of 7.4, she was  hemodynamically low stable in blood pressure in 100 , HR in 80s to 90s, she was tachypneic, she was found to have a hemoglobin of 3 with MCV 61, Last hemoglobin check was 2017 was 11.8 with MCV 86.3, WBC 9.2, Creatinine 0.49, BUN 25, Sodium 130, potassium 4.5, chloride 93, anion gap 17, bicarb 20, , , , her Tylenol level 14, she is taking Tylenol for the pain 1 g twice or 4 times daily for the pain   Only medication she is taking are  Mestinon, prednisone 20, Tylenol and simvastatin    blood culture and urine cultures , she was given 30 mL per KG fluid in ED and she was covered with vancomycin and Zosyn. Hx of PVD, macrocytic anemia of chronic disease ,  Hyperlipidemia, COPD, Paraplegia and Myasthenia gravis.  She  follows  for [facility], last visit was in February 2019, she was diagnosed with myasthenia gravis in June 2017, she is on Mestinon, as per last note she is she don't want to go through more testing including EMG but as Mestinon is helping , same of dose of Mestinon was continued with 90 mg in a.m., 60 mg known, 90 mg at dinner, 60 mg at bedtime, prednisone 20 mg every morning               rosalia History:        Diagnosis Date    . ............. 4/1/2013    Anemia     Ankle fracture     Cervical spondylosis     COPD (chronic obstructive pulmonary disease) (HCC) 6/1/2017    Decubitus ulcer     left foot     Hepatomegaly     HTN (hypertension)     Hyperlipemia     Lung nodule     Myasthenia (HCC)     Osteoarthritis     Prediabetes     PVD (peripheral vascular disease) (HCC)     Urinary incontinence        Past Surgical History:        Procedure Laterality Date    CERVICAL SPINE SURGERY      COLONOSCOPY      EYE SURGERY      LUMBAR SPINE SURGERY         Allergies:    No Known Allergies      Home Meds:   Prior to Admission medications    Medication Sig Start Date End Date Taking?  Authorizing Provider   sodium hypochlorite (DAKINS) 0.125 % SOLN external solution Apply topically daily Apply 2 times a day 5/1/19   Gabriela Lui MD   predniSONE (DELTASONE) 20 MG tablet take 1 tablet by mouth once daily 4/10/19   Sanya Lugo MD   pyridostigmine (MESTINON) 60 MG tablet 90 mg in the morning, 60 mg afternoon, 90 mg at dinnertime and 60 mg at bedtime 2/14/19   Sanya Lugo MD   simvastatin (ZOCOR) 10 MG tablet take 1 tablet by mouth every evening 2/8/19   Rusty Powell MD   aspirin (VERONICA ASPIRIN EC) 81 MG EC tablet take 1 tablet by mouth once daily 2/8/19   Rusty Powell MD   RA ACETAMINOPHEN EX  MG tablet take 2 tablets by mouth every 6 hours if needed for pain 1/1/19   Rusty Powell MD   sodium hypochlorite (DAKINS) 0.125 % SOLN external solution do moist dressing changes 2 times a day to the left heel 12/19/18   Gabriela Lui MD   Handicap Placard MISC by Does not apply route 12 months 11/13/18   Arielle Christy MD   vitamin D (CHOLECALCIFEROL) 1000 UNIT TABS tablet Take 1 tablet by mouth daily 8/1/16   Suraj Holloway MD       Social History:   TOBACCO:   reports that she quit smoking about 34 years ago. She quit after 30.00 years of use. She has never used smokeless tobacco.  ETOH:   reports that she does not drink alcohol. DRUGS:  reports that she does not use drugs. OCCUPATION:      Family History:       Problem Relation Age of Onset    Heart Attack Mother     High Blood Pressure Other            REVIEW OF SYSTEMS (ROS):    · Constitutional: Positive for fever and chills, chronically bedridden, malnourished  · Eyes: Negative for visual changes, diplopia  · ENT: Negative for mouth sores, sore throat. · Cardiovascular: Negative for lightheadedness ,chest pain, palpitations   · Respiratory:Negative for Shortness of breath,cough or wheezing. · Gastrointestinal: Negative for nausea/vomiting, change in bowel habits, abdominal pain  · Genitourinary:Negative for change in bladder habits, dysuria, hematuria. · Musculoskeletal: Negative for joint pain, there is a decubitus ulcer on sacrum grossly infected, left heel and right lateral ankle ulcers,   · Neurological: Negative for headache, change in muscle strength numbness/tingling  · Psychiatric: negative for change in mood, affect          Physical Exam:    Vitals: BP (!) 100/43   Pulse 88   Temp 96.3 °F (35.7 °C) (Rectal)   Resp 30   Ht 4' 11\" (1.499 m)   Wt 130 lb (59 kg)   SpO2 97%   BMI 26.26 kg/m²     Last Body weight:   Wt Readings from Last 3 Encounters:   05/23/19 130 lb (59 kg)   05/01/19 140 lb (63.5 kg)   03/27/19 140 lb (63.5 kg)       Body Mass Index : Body mass index is 26.26 kg/m².         PHYSICAL EXAMINATION :  · General appearance: awake, alert, cooperative, severely malnourished/bedridden  lady  · HEENT: Atraumatic, normocephalic, neck supple, normal EOM, thyroid normal, no lymphadenopathy   · Lungs: clear to auscultation bilaterally  · Heart: regular rate and rhythm, S1, S2 normal, no murmur  · Abdomen: soft, non-tender; bowel sounds normal; no masses,  no organomegaly  · Extremities: No cyanosis or edema, left is ulcers and right ankle ulcer, right ankle is mildly swollen, medical deconditioning  Joint contractures    · Neurological:  Awake, alert, oriented to name, place and time. Intact cranial nerves, move all 4 extremities, week on the lower extremities than upper. · Psych-normal affect     Any additional physical findings:  Laboratory findings:-    CBC: bedridden  Recent Labs     05/23/19  1733   WBC 9.2   HGB 3.0*   PLT See Reflexed IPF Result     BMP:    Recent Labs     05/23/19  1610   *   K 4.5   CL 93*   CO2 20   BUN 25*   CREATININE 0.49*   GLUCOSE 154*     S. Calcium:  Recent Labs     05/23/19  1610   CALCIUM 8.9     S. Ionized Calcium:No results for input(s): IONCA in the last 72 hours. S. Magnesium:No results for input(s): MG in the last 72 hours. S. Phosphorus:No results for input(s): PHOS in the last 72 hours. S. Glucose:No results for input(s): POCGLU in the last 72 hours. Glycosylated hemoglobin A1C: No results for input(s): LABA1C in the last 72 hours. INR:   Recent Labs     05/23/19  1610   INR 1.7     Hepatic functions:   Recent Labs     05/23/19  1610   ALKPHOS 209*   *   *   PROT 6.2*   BILITOT 0.86   LABALBU 3.4*     Pancreatic functions:No results for input(s): LACTA, AMYLASE in the last 72 hours. S. Lactic Acid: No results for input(s): LACTA in the last 72 hours. Cardiac enzymes:No results for input(s): CKTOTAL, CKMB, CKMBINDEX, TROPONINI in the last 72 hours. BNP:No results for input(s): BNP in the last 72 hours. Lipid profile: No results for input(s): CHOL, TRIG, HDL, LDLCALC in the last 72 hours.     Invalid input(s): LDL  Blood Gases: No results found for: PH, PCO2, PO2, HCO3, O2SAT  Thyroid functions:   Lab Results   Component Value Date    TSH 1.49 06/02/2017        Urinalysis:     Microbiology:    Cultures during this admission:     Blood cultures:                 [] None drawn      [] Negative             []  Positive (Details:  )  Urine Culture:                   [] None drawn      [] Negative             []  Positive (Details:  )  Sputum Culture:               [] None drawn       [] Negative             []  Positive (Details:  )   Endotracheal aspirate:     [] None drawn       [] Negative             []  Positive (Details:  )     Assessment and Plan       Patient Active Problem List   Diagnosis    PVD (peripheral vascular disease) ([address]    Microcytic anemia    Cervical spondylosis    Lung nodule    Essential hypertension    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[address]    HLD (hyperlipidemia)    Prediabetes    COPD (chronic obstructive pulmonary disease) ([address]    Centrilobular emphysema ([address]    Myasthenia ([address]    Lower paraplegia ([address]    Hypotension due to drugs    Pressure ulcer of right ankle, stage 2    Stage II pressure ulcer of left heel    Pressure injury of contiguous region involving back and right buttock, stage 2         Additional assessment:    Severe microcytic anemia, likely anemia of chronic disease and malnutrition  Dirty-looking decubitus sacral ulcer  Lactic acidosis  Severe dehydration  Transaminitis likely shock liver from sever anemia  , acetaminophen level 14  Hypernatremia  Hyperchloremia  History of myasthenia gravis on Mestinon, she dont want anymore testing for myasthenia, want to continue with the Mestinon  Severe malnutrition  Bedridden, multi-factorial, malnutrition, myasthenia gravis, paraplegia? ?      Plan:   Started on fluids at 100 ML per hour for now  We will transfuse 3 PRBCs  H&H every 12 hours, not actively bleeding from any site/no melena  Protonix IV daily  Ferritin, serum iron, TIBC, folic acid, vitamin Y89   Fecal Occult blood      Continue on Zosyn and vancomycin for now  Follow blood cultures and urine cultures  Trend lactic acid every 6 hours  BMP CBC in the morning  Consult wound care  General surgery for possible debridement  Hold simvastatin and Tylenol  HAV IgM, HBsAg, HCV AB  Repeat LFTs tomorrow  Continue Mestinon as per home dose, 90 in am , 60 mg at noon, 90 mg at dinner and 60 mg Hs  No chemical DVT prophylaxis, placed on EPC cuffs  NPO for now  Can take meds  Dietary consult tomorrow due to severe malnutrition       The critical care team assigned to the patient will be following up the patient in the intensive care unit. I have discussed the current plan with the critical care attending. The above mentioned assessment and plan will be reviewed again in detail by the critical care attending at bedside, and can be further changed or modified accordingly by the attending physician. Peggy Sutherland M.D. Critical care resident,  Department of Internal Medicine/ Critical care  [facility] ([place]             5/23/2019, 6:07 PM   Attending Physician Statement  I have discussed the care of Yang Thomas, including pertinent history and exam findings,  with the resident. I have seen and examined the patient and the key elements of all parts of the encounter have been performed by me. I agree with the assessment, plan and orders as documented by the resident with additions . Withdraw central line 5 cm   Treatment plan Discussed with nursing staff in detail , all questions answered . Electronically signed by Lyric Fernandes MD on   5/24/19 at 5:25 PM    Please note that this chart was generated using voice recognition Dragon dictation software. Although every effort was made to ensure the accuracy of this automated transcription, some errors in transcription may have occurred.

## 2019-05-23 NOTE — ED PROVIDER NOTES
[facility]  Emergency Department  Faculty Attestation     I performed a history and physical examination of the patient and discussed management with the resident. I reviewed the residents note and agree with the documented findings and plan of care. Any areas of disagreement are noted on the chart. I was personally present for the key portions of any procedures. I have documented in the chart those procedures where I was not present during the key portions. I have reviewed the emergency nurses triage note. I agree with the chief complaint, past medical history, past surgical history, allergies, medications, social and family history as documented unless otherwise noted below. For Physician Assistant/ Nurse Practitioner cases/documentation I have personally evaluated this patient and have completed at least one if not all key elements of the E/M (history, physical exam, and MDM). Additional findings are as noted. Primary Care Physician:  Alla Moore MD    Screenings:  [unfilled]    CHIEF COMPLAINT       Chief Complaint   Patient presents with    Wound Infection       RECENT VITALS:   Temp: 96.3 °F (35.7 °C),  Pulse: 88, Resp: 16, BP: (!) 101/33    LABS:  Labs Reviewed   CULTURE BLOOD #1   CULTURE BLOOD #1   URINE CULTURE   CBC WITH AUTO DIFFERENTIAL   COMPREHENSIVE METABOLIC PANEL   PROTIME-INR   LACTATE, SEPSIS   LACTATE, SEPSIS   URINALYSIS WITH MICROSCOPIC   APTT       Radiology  No orders to display       CRITICAL CARE: There was a high probability of clinically significant/life threatening deterioration in this patient's condition which required my urgent intervention. Total critical care time was none minutes. This excludes any time for separately reportable procedures. EKG:      Attending Physician Additional  Notes    Patient has sacral decubitus which is new. Patient has paraplegia and poor nutrition, decreased oral intake with some night loss. Visiting nurses of been dealing with pressure ulcers of both heels. There is no odor and wet appearance to the sacral decubitus. On exam she is hypotensive afebrile but tachypneic. Lungs are diminished. There is pallor noted. Abdomen is benign. She has atrophy of facial muscles and hand muscles. There is an odorous sacral decubitus to bone the sacrum. There are healed ischial decubiti. Impression is sacral decubitus, hypotension, malnutrition, dehydration, possible sepsis. Plan a sepsis labs, IV fluids, analgesics as needed, antibiotics, surgical consultation, admission for further care. Demarco Meneses. Boris Ruffin MD, [address]  Attending Emergency  Physician                Nicole Barrios MD  05/23/19 5066    Patient has hypotension, improved with IV LR. First lactate > 7. Elevations in Alk phos, ALT, AST, consider shock liver, APAP low. Hemoglobin result delayed, confirmed as 3. Type and cross for 2 units ordered. Vancomycin and Zosyn started for sepsis. FP team notified. Accepted by MICU team.     Severe Sepsis and Septic Shock Identification and Assessment      Recommended Order Set Used   [x]Yes [] No     SIRS Criteria (two or more of the systemic inflammatory response markers):    Temp >38.3 C (100.9 F) or <36 C (96.8 F)   []Yes [x] No     HR >90   []Yes [x] No     RR >20 or PaCO2 < 32 mmHg:   [x]Yes [] No     WBC < 4000 or > 72826 or > 10% bandemia   []Yes [x] No     SIRS positive   [x]Yes [] No     qSOFA Criteria (score of 2 is high risk): Altered mental status or GCS < 16   []Yes [x] No     RR > 22   [x]Yes [] No     SBP < 100 mm Hg   [x]Yes [] No     qSOFA Score        Sepsis Criteria (both required):    1. SIRS positive   [x]Yes [] No     2.  Presence of infection / suspected source   []  Pulmonary    [x]  Skin/Soft Tissue    []  Gastrointestinal    []  Urinary Tract    []  CNS     Qualifies for sepsis if 1 and 2 are positive   [x]Yes [] No     Severe Sepsis (MUST BE ASSESSED WITHIN 6 HOURS OF SIRS DIAGNOSIS):  Sepsis Criteria   [x]Yes [] No   Lactate >2 & < 4   []Yes [] No   SBP < 90 or MAP < 65 or decrease in SBP of > 40 from last normal SBP   [x]Yes [] No   Creatinine >2.0   []Yes [x] No   UOP < 0.5 cc/kg/h for 2 h   []Yes [x] No   T. Bilirubin > 2 mg/dl    []Yes [x] No   Platelets < 896,878   [x]Yes [] No   INR > 1.5   [x]Yes [] No   APTT > 60   []Yes [x] No   Acute respiratory failure requiring a new need for invasive or non-invasive mechanical ventilation   []Yes [x] No   One or more of the above   [x]Yes [] No   Severe sepsis present if yes to any of the above   [x]Yes [] No     Time Severe Sepsis Identified: 1700    Septic Shock Criteria:     1. Severe sepsis present   [x]Yes [] No     2. Lactic acid > 4 OR Persistent hypotension (SBP < 90 or MAP < 65 or decrease in SBP of > 40 from last normal SBP, in the hour immediately following crystalloid bolus)   [x]Yes [] No     Septic shock present if EITHER of the above are met within 6 hours    [x]Yes [] No       Treatment of Sepsis, Severe Sepsis and/or Septic Shock    WITHIN 3 HOURS OF PRESENTATION TIME:   Lactic acid (initial specimen collected)   [x]Yes [] No    Blood culture prior to IV antibiotics   [x]Yes [] No    IV antibiotic(s) initiated after cultures   [x]Yes [] No    Crystalloids bolus (30 cc/kg) - if hypotension and/or initial lactic acid greater than or equal to 4   [x]Yes [] No    Repeat lactic acid within 6 h, if initial level > 2   [x]Yes [] No    If septic shock with persistent hypotension (SBP < 90 or MAP < 65 or decrease in SBP of > 40 from last normal SBP, in the hour immediately following crystalloid bolus): vasopressor initiated:   [x]Yes [] No    Either: Norepinephrine, phenylephrine, dopamine, epinephrine, vasopressin     Septic Shock 6 Hour Volume Status, Focused Exam, & Tissue Perfusion Documentation:      Date: 5/23/19  Time: 6:45 PM    1.  Vital Signs  BP (!) 100/43   Pulse 88   Temp 96.3 °F (35.7 °C) (Rectal)   Resp 30   Ht 5' (1.524 m)   Wt 130 lb (59 kg)   SpO2 97%   BMI 25.39 kg/m²     2. Cardiopulmonary Exam  Heart: normal rate, regular rhythm, normal S1, S2, no murmurs, rubs, clicks or gallops, normal rate and regular rhythm  Lungs: clear to auscultation, no wheezes, rales or rhonchi, symmetric air entry     3. Capillary Refill:  Is Capillary Refill < 2 seconds?: Yes    4. Peripheral Pulse Evaluation   radial pulses 1+ bilaterally    5.  Skin Exam:   Capillary Refill > 3 seconds         Dillan Bustamante MD  05/23/19 9005

## 2019-05-23 NOTE — ED NOTES
Pt was brought into ER by family. Pt reports severe pain in her bottom due to a open pressure ulcer on her bottom. Family reports pt has had this ulcer for a couple weeks in which it has not gotten any better. Pt reports pain 8/10. pt has slight white drainage from wound. Pt denies chest pain. Pt states generalized body aches. Pt in not able to move by herself. Family states that her daughter is her care giver. Pt resting in bed in NAD with even and unlabored rr. Pt on cardiac monitor.  Will continue to monitor       Freddie Sharpe RN  05/23/19 9021

## 2019-05-24 PROBLEM — E44.0 MODERATE MALNUTRITION (HCC): Chronic | Status: ACTIVE | Noted: 2019-01-01

## 2019-05-24 NOTE — CONSULTS
Palliative Care Inpatient Consult    NAME:  [address] RECORD NUMBER:  [id number]  AGE: [de-identified] y.o. GENDER: female  : 1940  TODAY'S DATE:  2019    Reasons for Consultation:    Symptom and/or pain management  Provision of information regarding PC and/or hospice philosophies  Complex, time-intensive communication and interdisciplinary psychosocial support  Clarification of goals of care and/or assistance with difficult decision-making  Guidance in regards to resources and transition(s)    Members of PC team contributing to this consultation are :  Dr. Solitario Daniel palliative care attending  History of Present Illness     The patient is a [de-identified] y.o. Non-/non  female who presents with Wound Infection      Referred to Palliative Care by   [x] Physician   [] Nursing  [] Family Request   [] Other:       She was admitted to the critical care service for [facility]) [R57.9]  Severe anemia [D64.9]. Her hospital course has been associated with <principal problem not specified>. The patient has a complicated medical history and has been hospitalized since 2019  2:39 PM. The patient was admitted due to worsening of sacral decubitus ulcer, fever, chills, loss of appetite x 1 week and altered mental status changes. Patient was found to have very l;ow hemoglobin and was given blood transfusions for that. Patient has h/o myasthenia gravis, is bed ridden, severely malnourished, left heel and right lateral ankle ulcer follows [facility] wound clinic. General surgery consulted recommended medical management as of now. Palliative care consulted for goals of care and code status. Active Hospital Problems    Diagnosis Date Noted    [facility]) [R57.9] 2019    Severe anemia [D64.9] 2019       PAST MEDICAL HISTORY      Diagnosis Date    . .............  2013    Anemia     Ankle fracture     Cervical spondylosis     [facility] 2017    Decubitus ulcer     left foot     Hepatomegaly     HTN (hypertension)     Hyperlipemia     Lung nodule     Myasthenia (HCC)     Osteoarthritis     Prediabetes     PVD (peripheral vascular disease) (HCC)     Urinary incontinence        PAST SURGICAL HISTORY  Past Surgical History:   Procedure Laterality Date    CERVICAL SPINE SURGERY      COLONOSCOPY      EYE SURGERY      LUMBAR SPINE SURGERY         SOCIAL HISTORY  Social History     Tobacco Use    Smoking status: Former Smoker     Years: 30.00     Last attempt to quit: 1985     Years since quittin.4    Smokeless tobacco: Never Used   Substance Use Topics    Alcohol use: No     Alcohol/week: 0.0 oz    Drug use: No       ALLERGIES  No Known Allergies      MEDICATIONS  Current Medications    sodium chloride  250 mL Intravenous Once    sodium chloride  1,000 mL Intravenous Once    calcium gluconate IVPB  2 g Intravenous Once    vancomycin (VANCOCIN) intermittent dosing (placeholder)   Other RX Placeholder    sodium chloride flush  10 mL Intravenous 2 times per day    pantoprazole  40 mg Intravenous Daily    And    sodium chloride (PF)  10 mL Intravenous Daily    piperacillin-tazobactam  2.25 g Intravenous Q8H    vancomycin  1,000 mg Intravenous Q12H    predniSONE  20 mg Oral Daily    pyridostigmine  60 mg Oral BID    pyridostigmine  90 mg Oral BID     sodium chloride flush, magnesium hydroxide, ondansetron  IV Drips/Infusions   sodium chloride 100 mL/hr at 19     Home Medications  No current facility-administered medications on file prior to encounter.       Current Outpatient Medications on File Prior to Encounter   Medication Sig Dispense Refill    sodium hypochlorite (DAKINS) 0.125 % SOLN external solution Apply topically daily Apply 2 times a day 1 Bottle 2    predniSONE (DELTASONE) 20 MG tablet take 1 tablet by mouth once daily 30 tablet 2    pyridostigmine (MESTINON) 60 MG tablet 90 mg in the morning, 60 mg afternoon, 90 mg at dinnertime and 60 mg at bedtime 150 tablet 5    simvastatin (ZOCOR) 10 MG tablet take 1 tablet by mouth every evening 90 tablet 3    aspirin (RA ASPIRIN EC) 81 MG EC tablet take 1 tablet by mouth once daily 90 tablet 5    RA ACETAMINOPHEN EX  MG tablet take 2 tablets by mouth every 6 hours if needed for pain 120 tablet 1    sodium hypochlorite (DAKINS) 0.125 % SOLN external solution do moist dressing changes 2 times a day to the left heel 1 Bottle 2    Handicap Placard MISC by Does not apply route 12 months 1 each 0    vitamin D (CHOLECALCIFEROL) 1000 UNIT TABS tablet Take 1 tablet by mouth daily 30 tablet 5       Data         /73   Pulse 104   Temp 97.2 °F (36.2 °C) (Bladder)   Resp 21   Ht 5' (1.524 m)   Wt 115 lb 15.4 oz (52.6 kg)   SpO2 100%   BMI 22.65 kg/m²     Wt Readings from Last 3 Encounters:   05/23/19 115 lb 15.4 oz (52.6 kg)   05/01/19 140 lb (63.5 kg)   03/27/19 140 lb (63.5 kg)        Code Status: Full Code     ADVANCED CARE PLANNING:  Patient has capacity for medical decisions: yes  Health Care Power of : no  Living Will: no     Personal, Social, and Family History  Marital Status:   Living situation:with family:  daughter  Importance of danuta/Mormonism/spiritual beliefs: [] Very [x] Somewhat [] Not   Psychological Distress: mild  Does patient understand diagnosis/treatment? yes  Does caregiver understand diagnosis/treatment?  yes      Assessment        REVIEW OF SYSTEMS  Constitutional: Positive for fever and chills or weight loss  Eyes: no eye pain or blurred vision  ENT: no hearing loss, congestion, or difficulty swallowing   Respiratory: no wheezing, chest tightness, or shortness of breath   Cardiovascular: no chest pain or pressure, no palpitations, no diaphoresis   Gastrointestinal: no nausea, vomiting,abdominal pain, diarrhea or constipation, no melena   Genitourinary: no dysuria, frequency,hematuria , or nocturia   Musculoskeletal: no myalgias or arthralgias, no back pain   Skin: no rashes, positive for pressure ulcers  Neurological: no focal weakness, numbness, tingling, or headache, no seizures    PHYSICAL ASSESSMENT:  Constitutional: Alert and oriented to person, place, and time, cachetic, frail, bedridden  Head: Normocephalic and atraumatic. Eyes: EOM are normal. Pupils are equal, round   Neck: Normal range of motion. Neck supple. No tracheal deviation present. Cardiovascular: Normal rate and regular rhythm, S1, S2, no murmur   Pulmonary/Chest: Effort normal and breath sounds normal. No rales or wheezes. Abdomen: Soft. No tenderness, not distended, no ascites, no organomegaly   Musculoskeletal: Normal range of motion. No edema lower ext., left heel and right ankle ulcer present, sacral decubitus ulcer present, joint contractures  Neurological: CN II-XII grossly intact, no focal neurological deficits   Skin: no bleeding, no bruising     Palliative Performance Scale:  ___60%  Ambulation reduced; Significant disease; Can't do hobbies/housework; intake normal or reduced; occasional assist; LOC full/confusion  ___50%  Mainly sit/lie; Extensive disease; Can't do any work; Considerable assist; intake normal or reduced; LOC full/confusion  ___40%  Mainly in bed; Extensive disease; Mainly assist; intake normal or reduced; LOC full/confusion   __x_30%  Bed Bound; Extensive disease; Total care; intake reduced; LOCfull/confusion  ___20%  Bed Bound; Extensive disease; Total care; intake minimal; Drowsy/coma  ___10%  Bed Bound; Extensive disease;  Total care; Mouth care only; Drowsy/coma  ___0       Death      Plan      Palliative Interaction:  - The patient was seen today and and was lying comfortably in the bed, ill appearing, weak but alert and oriented x 3 and following commands    - Patient daughter Anitha Boateng was present in the patient's room    - I discussed patient current medical conditions with patient and Pat Zuniga told that the patient is , has 8 children - 6 daughters and 2 sons and lives with her daughter Janie Romo also told that the patient does not have POA paperwork     - I explained the patient about the different types of code status and she told that she does not want CPR/chest compressions to be done if her heart stopped and also does not want to be intubated if her breathing stopped    - I again explained the different types of code status to both the patient as well as Alela so that they clearly understood about the codes and they stated that they understood    - I changed patient's code status to DNR CCA with no intubation from full code    - I explained the significance of POA paperwork for health to the patient    - I ordered spiritual care consult for POA paperwork    - I met with the critical care team and updated them regarding patient's code status change as well as the spiritual care consult for POA paperwork    - I offered comfort and emotional support to the patient and family      Education/support to staff  Education/support to family  Education/support to patient  Discharge planning/helping to coordinate care  Communications with primary service  Caregiver support/education  Code status clarified: Full Code  Code status clarified: [facility]  Code status clarified: DNRCCA  Other major issues     Principle Problem/Diagnosis:  Sepsis    Additional Assessments:   Active Problems:    Shock ([address]    Severe anemia   Sacral pressure ulcer    1- Symptom management/ pain control     Pain Assessment:  The patient is not on any pain. Anxiety:  fatigue                          Dyspnea:  none                          Fatigue:  Tiredness and weakness    We feel the patient symptoms are being controlled. her current regimen is reviewed by myself and discussed with the staff.      - Code status changed to DNR - CCA with no intubation  - Spiritual care consulted for POA paperwork    2- Goals of care evaluation   The patient goals of care are improve or maintain function/quality of life, spiritual needs, remain at home, strengthening relationships, preserve independence/autonomy/control and support for family/caregiver   Goals of care discussed with:    [] Patient independently    [x] Patient and Family    [] Family or Healthcare DPOA independently    [] Unable to discuss with patient, family/DPOA not present    3- Code Status  DNR -CCA with No Intubation    4- Other recommendations   - We will continue to follow up with POA paperwork  - We will continue to provide comfort and support to the patient and the family  Please call with any palliative questions or concerns. Palliative Care Team is available via perfect serve or via phone. Palliative Care will continue to follow Ms. Kowalski's care as needed. Thank you for allowing Palliative Care to participate in the care of Ms. Kowalski . This note has been dictated by dragon, typing errors may be a possibility.   The total time I spent in seeing the patient and discussing goals of care, advance directives, code status and other major issues was more that 70 minutes      Electronically signed by   Kenia Dukes MD  Palliative Care Team  on 5/24/2019 at 9:39 AM    Palliative care office: [phone]

## 2019-05-24 NOTE — FLOWSHEET NOTE
[facility]   Patient Death Note  DEATH   Shift date:19   Shift day: Friday  Shift #: 2                 Room # 0119/0119-01   Name: Andrés Quinn            Age: [de-identified] y.o. Gender: female          Holiness: [place] of [employer]:   Admit Date & Time: 2019  2:39 PM     Referral: nurse  Actual date of death: 19       SITUATION AT DEATH:  Code status was changed earlier in the day, after a conversation with the doctor. Patient  in her sleep.  was called, to help comfort the family. Prayer was said. Grief packet given.  went to get nutritional cart for family. IS THIS A 'S CASE? No    SPIRITUAL/EMOTIONAL INTERVENTION:  Her eight children and other family members slowly came in. Appropriate grief was expressed. Grief packet given to Thelma Ariza, one of patient's daughter. Family Received Grief Packet? {YES       DOCTOR SIGNING DEATH NOTE:  Dr. Karen Castro spoke with [facility] Nurse  Erik Jason, who will contact the  home      Copy of COMPLETED Release of Body Form Received? Yes     HOME:  Name: Corey Gonzalez  Phone Number: [phone]    NEXT OF KIN:  [address] [facility]  Relationship:daughter  Street Address: [address] [place] [address]  City:[place]  State: [place]  Zip code: [address]   Phone Number: [phone]? No    IF SO, WHAT?       Electronically signed by Gina Esparza on 2019 at 5:31 PM.  [facility]  [phone]

## 2019-05-24 NOTE — PROGRESS NOTES
DEATH NOTE    PATIENT NAME: Eneida Fierro  YOB: 1940  MEDICAL RECORD NO. [id number]  DATE: 5/24/2019  PRIMARY CARE PHYSICIAN: Genna Gonzalez MD    DIAGNOSIS OF DEATH     I have confirmed the death of this patient in accordance with accepted medical standards.   The patient is dead as evidenced by cardiac death or cessation of brain function:    Cardiac Death (check all that apply):     [x]  Absence of respiratory effort by observation     [x]  Absence of pulse by palpation     [x]  Absence of blood pressure by sphygmomanometry     [x]  Absence of sustainable cardiac rhythm by monitor    Death by Cessation of Brain Function (check all that apply):     []  Absence of Cerebral Function with no motor response     []  Absence of brain stem function by systemic physical exam     []  Failure to respond with respiratory drive by apnea test     []  Cerebral Electrical silence as interpreted by qualified reader        OR     []  Absence of brain blood flow by radiologic technique      CERTIFICATION OF DEATH     I have pronounced the patient dead on:     Date: 5/24/2019 at [address]    NOTIFICATIONS     Attending physician (name) notified: Dr. Mai العراقي                                                        []  Per Nursing    Family notified: Name and/or Relationship:                                                        []  Per Nursing     notified (Name):                                                          [x]  Per [address]  5/24/2019, 4:01 PM

## 2019-05-24 NOTE — PROGRESS NOTES
Smoking Cessation - topics covered   []  Health Risks  []  Benefits of Quitting   []  Smoking Cessation  []  Patient has no history of tobacco use  [x]  Patient is former smoker. Patient quit in 1985. [x]  No need for tobacco cessation education. []  Booklet given  []  Patient verbalizes understanding. []  Patient denies need for tobacco cessation education. []  Unable to meet with patient today. Will follow up as able.   Kennedy Chaudhary  7:38 AM

## 2019-05-24 NOTE — PROGRESS NOTES
INTENSIVE CARE UNIT  Resident Physician Progress Note    Patient - Princess Vigil  Date of Admission -  2019  2:39 PM  Date of Evaluation -  2019  Room and Bed Number -  [facility],Building 9 Day - 1    Chief Complaint   Patient presents with    Wound Infection      SUBJECTIVE:     OVERNIGHT EVENTS:        Patient was seen and examined at bedside, the patient was feeling more weak, she did not  take last night dose of Mestinon, on ABG she was getting more acidotic and retaining CO2, she was placed on BiPAP and morning dose of Mestinon was given.   If she is getting better we will switch to oral diet  She on Protonix 40 mg once daily, she will need EGD, we'll consult GI  Will give 2 more units of blood and repeat hemoglobin after 1 hour, 2 g of calcium gluconate  Palliative care was consulted and discussed the code status with the family, agreed to change code status to DNR CCA, likely due to a point a POA today        AWAKE & FOLLOWING COMMANDS:  [] No   [x] Yes    SECRETIONS Amount:  [] Small [] Moderate  [] Large  [x] None  Color:     [] White [] Colored  [] Bloody    SEDATION:  RAAS Score:  [] Propofol gtt  [] Versed gtt  [] Ativan gtt   [x] No Sedation    PARALYZED:  [] No    [] Yes    VASOPRESSORS:  [x] No    [] Yes  [] Levophed [] Dopamine [] Vasopressin  [] Dobutamine [] Phenylephrine [] Epinephrine    Ros unable to perform due to bipap   OBJECTIVE:     VITAL SIGNS:  /73   Pulse 104   Temp 97.2 °F (36.2 °C) (Bladder)   Resp 21   Ht 5' (1.524 m)   Wt 115 lb 15.4 oz (52.6 kg)   SpO2 100%   BMI 22.65 kg/m²   Tmax over 24 hours:  Temp (24hrs), Av.8 °F (34.9 °C), Min:93.4 °F (34.1 °C), Max:97.2 °F (36.2 °C)      Patient Vitals for the past 8 hrs:   BP Temp Temp src Pulse Resp SpO2   19 0900 113/73 -- -- 104 21 --   19 0800 99/63 97.2 °F (36.2 °C) Bladder 101 13 --   19 0715 98/64 -- -- 98  --   19 0700 96/82 -- -- 98 24 --   19 0645 99/60 -- -- 97  -- 05/24/19 0630 107/69 -- -- 96 16 100 %   05/24/19 0615 87/61 -- -- 94 20 100 %   05/24/19 0600 (!) 107/58 -- -- 94 12 100 %   05/24/19 0545 98/83 -- -- 95 18 98 %   05/24/19 0530 95/66 -- -- 95 8 100 %   05/24/19 0515 (!) 75/50 -- -- 93 18 96 %   05/24/19 0500 87/60 -- -- 92 23 100 %   05/24/19 0445 (!) 99/58 -- -- 94 28 100 %   05/24/19 0430 84/65 -- -- 95 21 100 %   05/24/19 0415 (!) 91/58 -- -- 96 20 99 %   05/24/19 0400 (!) 111/57 93.4 °F (34.1 °C) Bladder 95 18 100 %   05/24/19 0350 104/64 -- -- 98 25 --   05/24/19 0347 (!) 97/48 93.4 °F (34.1 °C) -- 98 26 --   05/24/19 0345 (!) 97/48 -- -- 97 22 --         Intake/Output Summary (Last 24 hours) at 5/24/2019 1109  Last data filed at 5/24/2019 0524  Gross per 24 hour   Intake 1507.5 ml   Output 150 ml   Net 1357.5 ml     Date 05/24/19 0000 - 05/24/19 2357   Shift 5980-7428 1517-1727 6233-7654 24 Hour Total   INTAKE   Blood(mL/kg) 957. 5(18.2)   957. 5(18.2)   Shift Total(mL/kg) 957. 5(18.2)   957. 5(18.2)   OUTPUT   Urine(mL/kg/hr) 150(0.4)   150   Shift Total(mL/kg) 150(2.9)   150(2.9)   Weight (kg) 52.6 52.6 52.6 52.6     Wt Readings from Last 3 Encounters:   05/23/19 115 lb 15.4 oz (52.6 kg)   05/01/19 140 lb (63.5 kg)   03/27/19 140 lb (63.5 kg)     Body mass index is 22.65 kg/m². PHYSICAL EXAM:  · General appearance: awake, alert, cooperative, feeling weak and lethargic  · HEENT: Atraumatic, normocephalic, neck supple, normal EOM, thyroid normal, no lymphadenopathy   · Lungs: clear to auscultation bilaterally  · Heart: regular rate and rhythm, S1, S2 normal, no murmur  · Abdomen: soft, non-tender; bowel sounds normal; no masses,  no organomegaly  · Extremities: No cyanosis or edema  · Neurological:  Awake, alert, oriented to name, place and time.   lethargic   · Psych-normal affect       MEDICATIONS:  Scheduled Meds:   sodium chloride  250 mL Intravenous Once    sodium chloride  1,000 mL Intravenous Once    collagenase   Topical Daily    sodium chloride flush  10 mL Intravenous 2 times per day    vancomycin  1,000 mg Intravenous Q12H    vancomycin (VANCOCIN) intermittent dosing (placeholder)   Other RX Placeholder    pantoprazole  40 mg Intravenous Daily    And    sodium chloride (PF)  10 mL Intravenous Daily    piperacillin-tazobactam  2.25 g Intravenous Q8H    predniSONE  20 mg Oral Daily    pyridostigmine  60 mg Oral BID    pyridostigmine  90 mg Oral BID     Continuous Infusions:   sodium chloride 100 mL/hr at 05/23/19 2028     PRN Meds:     sodium chloride flush 10 mL PRN   magnesium hydroxide 30 mL Daily PRN   ondansetron 4 mg Q6H PRN       SUPPORT DEVICES: [] Ventilator [x] BIPAP  [] Nasal Cannula [] Room Air    VENT SETTINGS (Comprehensive) (if applicable):    Additional Respiratory  Assessments  Pulse: 104  Resp: 21  SpO2: 100 %    ABGs:     Lab Results   Component Value Date    ANT3GOY 29 06/02/2017    FIO2 2.0 05/24/2019         DATA:  Complete Blood Count: Recent Labs     05/23/19  1733 05/24/19  0310   WBC 9.2 11.3   RBC 2.14* 2.60*   HGB 3.0* 4.8*   HCT 13.2* 18.2*   MCV 61.7* 70.0*   MCH 14.0* 18.5*   MCHC 22.7* 26.4*   RDW 25.6* 30.8*   PLT See Reflexed IPF Result See Reflexed IPF Result   MPV NOT REPORTED NOT REPORTED        Last 3 Blood Glucose:   Recent Labs     05/23/19  1610 05/24/19  0310 05/24/19  0728   GLUCOSE 154* 184* 171*        PT/INR:    Lab Results   Component Value Date    PROTIME 17.6 05/23/2019    INR 1.7 05/23/2019     PTT:    Lab Results   Component Value Date    APTT 26.7 05/23/2019       Comprehensive Metabolic Profile:   Recent Labs     05/23/19  1610 05/24/19  0244 05/24/19  0310 05/24/19  0728   *  --  132* 133*   K 4.5  --  4.3 4.2   CL 93*  --  98 99   CO2 20  --  21 26   BUN 25*  --  23 25*   CREATININE 0.49* 0.80 0.41* 0.48*   GLUCOSE 154*  --  184* 171*   CALCIUM 8.9  --  7.9* 7.9*   PROT 6.2*  --  5.3* 5.5*   LABALBU 3.4*  --  3.0* 3.1*   BILITOT 0.86  --  1.26* 1.31*   ALKPHOS 209*  --  191* 192* *  --  394* 407*   *  --  474* 495*      Magnesium:   Lab Results   Component Value Date    MG 2.4 05/24/2019     Phosphorus:   Lab Results   Component Value Date    PHOS 2.6 05/24/2019    PHOS 2.5 06/03/2017    PHOS 5.2 06/01/2017     Ionized Calcium:   Lab Results   Component Value Date    CAION 1.28 06/11/2017    CAION 1.20 06/09/2017    CAION 1.22 06/07/2017        Urinalysis: Lab Results   Component Value Date    NITRU NEGATIVE 11/09/2017    COLORU YELLOW 11/09/2017    PHUR 7.0 11/09/2017    WBCUA None 11/09/2017    RBCUA 0 TO 2 11/09/2017    MUCUS 1+ 11/09/2017    TRICHOMONAS NOT REPORTED 11/09/2017    YEAST NOT REPORTED 11/09/2017    BACTERIA MODERATE 11/09/2017    SPECGRAV 1.014 11/09/2017    LEUKOCYTESUR NEGATIVE 11/09/2017    UROBILINOGEN Normal 11/09/2017    BILIRUBINUR NEGATIVE 11/09/2017    GLUCOSEU 1+ 11/09/2017    KETUA TRACE 11/09/2017    AMORPHOUS NOT REPORTED 11/09/2017       HgBA1c:    Lab Results   Component Value Date    LABA1C 5.9 03/23/2018     TSH:    Lab Results   Component Value Date    TSH 1.51 05/23/2019     Lactic Acid:   Lab Results   Component Value Date    LACTA NOT REPORTED 05/24/2019    LACTA NOT REPORTED 05/23/2019      Troponin: No results for input(s): TROPONINI in the last 72 hours. ASSESSMENT:       Active Problems:    Shock ([address]    Severe anemia    Severe  malnutrition (HCC)  Resolved Problems:    * No resolved hospital problems. *     Acute hypercapnic respiratory failure   Myasthenia gravis   sacral pressure wound stage 3  Shock live   Iron deficicnecy anemia   Hypernatremia  Hyperchloremia  Dehydration   PLAN:     WEAN PER PROTOCOL:  [] No   [] Yes  [x] N/A    ICU PROPHYLAXIS:  Stress ulcer:  [x] PPI Agent  [] [id number] [] Sucralfate  [] Other:  VTE:   [] Enoxaparin  [] Unfract.  Heparin Subcut  [x] EPC Cuffs    NUTRITION:  [x] NPO [] Tube Feeding (Specify: ) [] TPN  [] PO    HOME MEDS RECONCILED: [] No  [x] Yes    CONSULTATION NEEDED:  [] No  [x] Yes    FAMILY UPDATED:    [] No  [x] Yes    TRANSFER OUT OF ICU:   [x] No  [] Yes        Additional Assessment:  Additional assessment:     Severe microcytic anemia, likely anemia of chronic disease and malnutrition  Dirty-looking decubitus sacral ulcer  Lactic acidosis  Severe dehydration  Transaminitis likely shock liver from sever anemia  , acetaminophen level 14  Hypernatremia  Hyperchloremia  History of myasthenia gravis on Mestinon, she dont want anymore testing for myasthenia, want to continue with the Mestinon  Severe malnutrition  Bedridden, multi-factorial, malnutrition, myasthenia gravis, paraplegia? ?   Hypercapnic acute respiratory failure     Plan:   Continue on fluids at 75 ML per hour for now  Will transfer 2 more units of PRBCs and will repeat hemoglobin after 1 hour, 2 g of calcium gluconate  H&H every 12 hours, not actively bleeding from any site/no melena  Protonix IV daily  Fecal Occult blood   This patient on BiPAP for now, repeat ABG after 1 hour, continue on BiPAP and Mestinon      Continue on Zosyn and vancomycin for now  Follow blood cultures and urine cultures  Trend lactic acid every 6 hours  BMP CBC in the morning  Consult wound care  General surgery for possible debridement  Hold simvastatin and Tylenol  Repeat LFTs tomorrow  Continue Mestinon as per home dose, 90 in am , 60 mg at noon, 90 mg at dinner and 60 mg Hs  No chemical DVT prophylaxis, placed on EPC cuffs  NPO for now  Can take meds  Dietary consult tomorrow due to severe malnutrition  Patient is changed to DNR CCA   Will need GI / workup on ce she is more stable          The critical care team assigned to the patient will be following up the patient in the intensive care unit. I have discussed the current plan with the critical care attending. The above mentioned assessment and plan will be reviewed again in detail by the critical care attending at bedside, and can be further changed or modified accordingly by the attending physician. [facility], [place], [place]  5/24/2019 11:09 AM   Attending Physician Statement  I have discussed the care of Calixto Fajardo, including pertinent history and exam findings,  with the resident. I have seen and examined the patient and the key elements of all parts of the encounter have been performed by me. I agree with the assessment, plan and orders as documented by the resident with additions . ABG ordered due to lethargy and poor ventilation - noted acute respiratory acidosis   Started nippv   Palliative consult due to multiple co morbidities   Dnrcca no intubation    Pt continued to decline and passed away   Total critical care time caring for this patient with life threatening, unstable organ failure, including direct patient contact, management of life support systems, review of data including imaging and labs, discussions with other team members and physicians at least 28   Min so far today, excluding procedures. Treatment plan Discussed with nursing staff in detail , all questions answered . Electronically signed by Anjali Sadler MD on   5/24/19 at 5:23 PM    Please note that this chart was generated using voice recognition Dragon dictation software. Although every effort was made to ensure the accuracy of this automated transcription, some errors in transcription may have occurred.

## 2019-05-24 NOTE — CONSULTS
General Surgery:  Consult Note        PATIENT NAME: Obdulio Gave   YOB: 1940    ADMISSION DATE: 5/23/2019  2:39 PM     Consulted Physician: General Surgery     TODAY'S DATE: 5/24/2019    Chief Complaint:  Worsening decubitus ulcer  Consult Regarding: sacral ulcer    HISTORY OF PRESENT ILLNESS:  The patient is a [de-identified] y.o. female  who was admitted on 5/23. She was brought from home by her family because she had had fatigue, decreased oral intake, and stomach pain for the last 4-5 days. She also complained of feeling hot and some chills but never had a temperature taken. Her daughters take care of her at home and report that she has decreased urine output and bowel movements for the last couple of days as well. The also noticed a gradual decline in her mentation over the last 2 days. She has positive sick contacts recently. She does not ambulate by herself at home and has developed some chronic non-healing wounds. She has one on her left posterior ankle and one on her right lateral ankle exposing hardware from a previous ankle surgery. She also has a large sacral ulcer that her family states is getting worse. The wounds have been present for the last couple of months and her family has been managing them at home. Past Medical History:        Diagnosis Date    . .............  4/1/2013    Anemia     Ankle fracture     Cervical spondylosis     COPD (chronic obstructive pulmonary disease) (HCC) 6/1/2017    Decubitus ulcer     left foot     Hepatomegaly     HTN (hypertension)     Hyperlipemia     Lung nodule     Myasthenia (HCC)     Osteoarthritis     Prediabetes     PVD (peripheral vascular disease) (HCC)     Urinary incontinence        Past Surgical History:        Procedure Laterality Date    CERVICAL SPINE SURGERY      COLONOSCOPY      EYE SURGERY      LUMBAR SPINE SURGERY         Medications Prior to Admission:   Medications Prior to Admission: sodium hypochlorite (DAKINS) 0.125 % SOLN external solution, Apply topically daily Apply 2 times a day  predniSONE (DELTASONE) 20 MG tablet, take 1 tablet by mouth once daily  pyridostigmine (MESTINON) 60 MG tablet, 90 mg in the morning, 60 mg afternoon, 90 mg at dinnertime and 60 mg at bedtime  simvastatin (ZOCOR) 10 MG tablet, take 1 tablet by mouth every evening  aspirin (RA ASPIRIN EC) 81 MG EC tablet, take 1 tablet by mouth once daily  RA ACETAMINOPHEN EX  MG tablet, take 2 tablets by mouth every 6 hours if needed for pain  sodium hypochlorite (DAKINS) 0.125 % SOLN external solution, do moist dressing changes 2 times a day to the left heel  Handicap Placard MISC, by Does not apply route 12 months  vitamin D (CHOLECALCIFEROL) 1000 UNIT TABS tablet, Take 1 tablet by mouth daily    Allergies:  Patient has no known allergies. Social History:   Social History     Socioeconomic History    Marital status:      Spouse name: Not on file    Number of children: Not on file    Years of education: Not on file    Highest education level: Not on file   Occupational History    Not on file   Social Needs    Financial resource strain: Not on file    Food insecurity:     Worry: Not on file     Inability: Not on file    Transportation needs:     Medical: Not on file     Non-medical: Not on file   Tobacco Use    Smoking status: Former Smoker     Years: 30.00     Last attempt to quit: 1985     Years since quittin.4    Smokeless tobacco: Never Used   Substance and Sexual Activity    Alcohol use: No     Alcohol/week: 0.0 oz    Drug use: No    Sexual activity: Not on file   Lifestyle    Physical activity:     Days per week: 0 days     Minutes per session: Not on file    Stress:  Only a little   Relationships    Social connections:     Talks on phone: Not on file     Gets together: Not on file     Attends Caodaism service: Not on file     Active member of club or organization: Not on file     Attends meetings of clubs or organizations: Not on file     Relationship status: Not on file    Intimate partner violence:     Fear of current or ex partner: Not on file     Emotionally abused: Not on file     Physically abused: Not on file     Forced sexual activity: Not on file   Other Topics Concern    Not on file   Social History Narrative    Not on file       Family History:       Problem Relation Age of Onset    Heart Attack Mother     High Blood Pressure Other        REVIEW OF SYSTEMS:    CONSTITUTIONAL: admits to recent fatigue and change from baseline mentation, denies any weight loss or fevers  HEENT: Denies rhinorrhea, dysphagia, odynphagia. CARDIOVASCULAR: Denies history of MI, recent chest pain. RESPIRATORY: Denies recent history of shortness of breath  GASTROINTESTINAL: admits to recent stomach pain and decreased oral intake  GENITOURINARY: admits to decreased frequency of urination; denies dysuria  HEMATOLOGIC/LYMPHATIC: Denies history of anemia or DVTs. ENDOCRINE: Denies history of thyroid problems or diabetes. NEURO: Denies history of CVA  Review of systems negative unless listed above. PHYSICAL EXAM:    VITALS:  BP 98/64   Pulse 98   Temp 93.4 °F (34.1 °C) (Bladder)   Resp 21   Ht 5' (1.524 m)   Wt 115 lb 15.4 oz (52.6 kg)   SpO2 100%   BMI 22.65 kg/m²   INTAKE/OUTPUT:     Intake/Output Summary (Last 24 hours) at 5/24/2019 0859  Last data filed at 5/24/2019 0524  Gross per 24 hour   Intake 1507.5 ml   Output 150 ml   Net 1357.5 ml       CONSTITUTIONAL:  awake, somnolent, no apparent distress  HEENT: Normocephalic/atraumatic, without obvious abnormality. NECK:  Supple, symmetrical, trachea midline   CARDIOVASCULAR: Regular rhythm without murmurs, tachycardic. LUNGS: Clear to auscultation bilaterally without evidence of wheezing or tachypnea. ABDOMEN: soft, nontender, bowel sounds present   MUSCULOSKELETAL: Muscle strength weakened in all extremities bilaterally. NEUROLOGIC: CN II- XII intact.  Delwyn Socks motor intact. SKIN: 1cm nonhealing ulcer on left posterior ankle; one centimeter non-healing ulcer with exposed ankle hardware on right lateral malleolus, large sacral ulcer with fibrinous exudate present in the center, no erythema or purulent drainage, no exposed bone; stage 3 ulcer at the center of sacrum; small stage 1 wounds present on the periphery and buttocks  Orientation:   oriented to person, place, and time        Media Information                  Document Information     Wound      05/23/2019 15:34   Attached To: Hospital Encounter on 5/23/19     Source Information     Rodger Winter MD  [facility] Emergency Dept     CBC with Differential:    Lab Results   Component Value Date    WBC 11.3 05/24/2019    RBC 2.60 05/24/2019    HGB 4.8 05/24/2019    HCT 18.2 05/24/2019    PLT See Reflexed IPF Result 05/24/2019    MCV 70.0 05/24/2019    MCH 18.5 05/24/2019    MCHC 26.4 05/24/2019    RDW 30.8 05/24/2019    NRBC 3 05/24/2019    LYMPHOPCT 4 05/24/2019    MONOPCT 8 05/24/2019    BASOPCT 0 05/24/2019    MONOSABS 0.90 05/24/2019    LYMPHSABS 0.45 05/24/2019    EOSABS 0.00 05/24/2019    BASOSABS 0.00 05/24/2019    DIFFTYPE NOT REPORTED 05/24/2019     CMP:    Lab Results   Component Value Date     05/24/2019    K 4.3 05/24/2019    CL 98 05/24/2019    CO2 21 05/24/2019    BUN 23 05/24/2019    CREATININE 0.41 05/24/2019    GFRAA >60 05/24/2019    LABGLOM >60 05/24/2019    GLUCOSE 184 05/24/2019    PROT 5.3 05/24/2019    LABALBU 3.0 05/24/2019    CALCIUM 7.9 05/24/2019    BILITOT 1.26 05/24/2019    ALKPHOS 191 05/24/2019     05/24/2019     05/24/2019       Pertinent Radiology:   Us Liver    Result Date: 5/24/2019  Shadowing gallstones in the gallbladder and gallbladder wall thickening measuring 5 mm. Findings raise concern for acute cholecystitis in the appropriate clinical setting. Further evaluation with nuclear medicine hepatobiliary scan is recommended.  Sonographic Parkinson Bodo sign was not documented. Waiting to hear back from the technologist regarding the sonographic Pace sign. Please await addendum to report. The findings were sent to the Radiology Results [address] at 8:40 am on 5/24/2019to be communicated to a licensed caregiver. Xr Chest Portable    Result Date: 5/24/2019  EXAMINATION: ONE XRAY VIEW OF THE CHEST 5/24/2019 3:38 am COMPARISON: 11/09/2017 HISTORY: ORDERING SYSTEM PROVIDED HISTORY: placement verification TECHNOLOGIST PROVIDED HISTORY: placement verification Ordering Physician Provided Reason for Exam: placement verification Acuity: Unknown Type of Exam: Unknown FINDINGS: Right internal jugular central line tip terminates in the right atrium. There is no evidence of pneumothorax. Monitor wires are seen overlying the chest.  The lungs are underinflated, resulting in vascular crowding and subsegmental atelectasis. Probable small right pleural effusion. There is central pulmonary vascular congestion without overt pulmonary edema. The cardiac silhouette is stable. Osseous structures are stable. 1. Right internal jugular central line tip terminates in the right atrium. No evidence of pneumothorax. 2. Small right pleural effusion. 3. Central pulmonary vascular congestion without overt pulmonary edema. ASSESSMENT:  Active Hospital Problems    Diagnosis Date Noted    Shock [facility]) [R57.9] 05/23/2019    Severe anemia [D64.9] 05/23/2019       Jonel Berry is an [de-identified]year old female who presented with a couple days of fatigue, decreased oral intake, and altered mentation and a large worsening sacral ulcer; BL chronic wounds on heel/maleolus   -Stage 3 sacral decubitus ulcer  -Elevated lactic acid, improving  -Hyponatremia  -Anemia, given 3 units prbcs    Plan:  1. Continue medical mgmt and supportive care per primary  2. Recommend position changes/minimize pressure as able to, wound care daily  3.  Santyl to sacral ulcer today, possible bedside debridement tomorrow, will re-eval in the AM after santyl today  4. Wound ostomy already consulted   5. F/U labs, H&H      Electronically signed by Iris Frances  on 5/24/2019 at 8:59 AM     General Surgery Resident Statement/Note:  I have discussed the case, including pertinent history and exam findings with the above medical student have personally seen the patient. Pt seen and examined at bedside. I performed both history and physical exam. Pt feeling fatigued and decreases mentation lately. Surgery consulted for sacral ulcer. Pt had stage 3 sacral ulcer. Also severe anemia with Hgb of 3.0 upon arrival. F/U H&H. Local wound care and relief of pressure to area. Will order santyl and possible bedside debridement tomorrow morning. Wound ostomy consulted already. Note was edited and changes made by me. Assessment and plan reviewed and changes made by me. I agree with the assessment and plan as stated above.     Electronically signed by Maury Cisse DO on 5/24/2019 at 9:53 AM

## 2019-05-24 NOTE — PROGRESS NOTES
Nutrition Assessment    Type and Reason for Visit: Initial(Poor Appetite/Intakes, Malnutrition)    Nutrition Recommendations:   - Continue NPO. Monitor for start of oral diet. Will provide oral supplements with meals as able. - Monitor for plan of care. Nutrition Assessment: Pt nutritionally compromised as evidenced by reported poor appetite and presence of wounds. Noted pt with reports of a decreased appetite and oral intakes x 1 week. Pt on Bipap x attempted visits. Remains NPO currently. As diet advanced will provide oral supplements with meals. Malnutrition Assessment:  · Malnutrition Status: Meets the criteria for moderate malnutrition  · Context: Acute illness or injury  · Findings of the 6 clinical characteristics of malnutrition (Minimum of 2 out of 6 clinical characteristics is required to make the diagnosis of moderate or severe Protein Calorie Malnutrition based on AND/ASPEN Guidelines):  1. Energy Intake-Less than or equal to 75% of estimated energy requirement, Greater than or equal to 7 days    2. Weight Loss-Unable to assess, (Unable to determine due to hx of stated weights)  3. Fat Loss-Moderate subcutaneous fat loss, Orbital  4. Muscle Loss-Mild muscle mass loss, Temples (temporalis muscle), Clavicles (pectoralis and deltoids)  5.  Fluid Accumulation-Mild fluid accumulation, Extremities    Nutrition Risk Level: High    Nutrient Needs:  · Estimated Daily Total Kcal: 9818-4385 kcal/day  · Estimated Daily Protein (g): 55-70 gm pro/day    Nutrition Diagnosis:   · Problem: Inadequate oral intake  · Etiology: related to Appetite, Current Condition, Difficulty Breathing     Signs and symptoms:  as evidenced by NPO status due to medical condition, Diet history of poor intake    Objective Information:  · Wound Type: Pressure Ulcer, Unstageable, Stage III(to coccyx and left heel)  · Current Nutrition Therapies:  · Oral Diet Orders: NPO   · Anthropometric Measures:  · Ht: 5' (152.4 cm) · Current Body Wt: 115 lb 15.4 oz (52.6 kg)  · Admission Body Wt: 130 lb (59 kg)(stated)  · Usual Body Wt: (140-145 lb usual stated weight)  · Ideal Body Wt: 100 lb (45.4 kg), % Ideal Body 130%  · BMI Classification: BMI 18.5 - 24.9 Normal Weight    Nutrition Interventions:   Continue NPO - Monitor for diet advancement - will provide oral supplements with meals as able. Continued Inpatient Monitoring, Education Not Indicated    Nutrition Evaluation:   · Evaluation: Goals set   · Goals: Oral intakes to meet at least 50% of estimated nutrition needs once diet started.     · Monitoring: Nutrition Progression, Skin Integrity, Wound Healing, I&O, Weight, Pertinent Labs, Monitor Hemodynamic Status    Electronically signed by Aixa Guillory RD, ALAINA on 5/24/19 at 3:27 PM    Contact Number: [phone]

## 2019-05-24 NOTE — PROGRESS NOTES
[facility] Nurse  Consult Note       NAME:  [address] RECORD NUMBER:  [id number]  AGE: [de-identified] y.o. GENDER: female  : 1940  TODAY'S DATE:  2019    Subjective:     Reason for WOCN Evaluation and Assessment: left heel ST 3 pressure injury and right lateral ankle ST 4 pressure injury/ chronic wounds, new unstageable sacral ulcer. Sacral ulcer care prescribed per general surgery with collagenase (santyl) ordered and possible debridement planned. Misha Harris is a [de-identified] y.o. female referred by:   [x] Physician  [] Nursing  [] Other:     Wound Identification:  Wound Type: pressure and non-healing/non-surgical  Contributing Factors: diabetes, chronic pressure, decreased mobility, shear force, arterial insufficiency and decreased tissue oxygenation     Stage 3 pressure injury describes a localized injury to the skin and underlying tissue usually over a bony prominence as a result of pressure. This stage of injury is a full thickness loss of tissue. Subcutaneous fat may be visible but bone, tendon, or muscle are not exposed. [place] may be present but it cannot obscure the depth of tissue loss. May include undermining and tunneling. Stage 4 pressure injury describes a full thickness tissue loss with exposed bone, tendon, or muscle. The extension into bone, muscle, and/or supporting structures make osteomyleitis possible. Unstageable pressure injuries are full thickness tissue loss in whuch the base of the wound is covered by slough and/or eschar. Until enough slough and/or eschar is removed to expose underlying base of the wound and the true depth the true stage cannot be determined. PAST MEDICAL HISTORY        Diagnosis Date    . .............  2013    Anemia     Ankle fracture     Cervical spondylosis     [facility]    Decubitus ulcer     left foot     Hepatomegaly     HTN (hypertension)     Hyperlipemia  Lung nodule     Myasthenia (HCC)     Osteoarthritis     Prediabetes     PVD (peripheral vascular disease) (HCC)     Urinary incontinence        PAST SURGICAL HISTORY    Past Surgical History:   Procedure Laterality Date    CERVICAL SPINE SURGERY      COLONOSCOPY      EYE SURGERY      LUMBAR SPINE SURGERY         FAMILY HISTORY    Family History   Problem Relation Age of Onset    Heart Attack Mother     High Blood Pressure Other        SOCIAL HISTORY    Social History     Tobacco Use    Smoking status: Former Smoker     Years: 30.00     Last attempt to quit: 1985     Years since quittin.4    Smokeless tobacco: Never Used   Substance Use Topics    Alcohol use: No     Alcohol/week: 0.0 oz    Drug use: No       ALLERGIES    No Known Allergies        Objective:      /63   Pulse 105   Temp 97.2 °F (36.2 °C) (Bladder)   Resp 20   Ht 5' (1.524 m)   Wt 115 lb 15.4 oz (52.6 kg)   SpO2 100%   BMI 22.65 kg/m²   Juan Risk Score:      LABS    CBC:   Lab Results   Component Value Date    WBC 15.9 2019    RBC 3.30 2019    HGB 6.7 2019     CMP:  Albumin:    Lab Results   Component Value Date    LABALBU 3.1 2019     PT/INR:    Lab Results   Component Value Date    PROTIME 17.6 2019    INR 1.7 2019     HgBA1c:    Lab Results   Component Value Date    LABA1C 5.9 2018     PTT: No components found for: LABPTT      Assessment:     Patient Active Problem List   Diagnosis    PVD (peripheral vascular disease) (HCC)    Microcytic anemia    Cervical spondylosis    Lung nodule    Essential hypertension    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HCC)    HLD (hyperlipidemia)    Prediabetes    COPD (chronic obstructive pulmonary disease) (HCC)    Centrilobular emphysema (HCC)    Myasthenia (HCC)    Lower paraplegia (HCC)    Hypotension due to drugs    Pressure ulcer of right ankle, stage 2    Stage II pressure ulcer of left heel    Pressure injury of contiguous region involving back and right buttock, stage 2    Shock ([address]    Severe anemia       Measurements:     05/24/19 1149   Wound 12/19/18 Plantar [address] Left heel    Date First Assessed/Time First Assessed: 12/19/18 1457   Present on Hospital Admission: Yes  Wound Approximate Age at First Assessment (Weeks): 1 weeks  Primary Wound Type: Pressure Injury  Wound Location Orientation: Plantar  Wound Description (Comme. .. Wound Image    Wound Pressure Stage  3   Dressing Status Changed   Dressing Changed Changed/New   Dressing/Treatment Wound gel;Packing  (Iodoform)   Wound Cleansed Rinsed/Irrigated with saline   Dressing Change Due 05/25/19   Wound Length (cm) 0.8 cm   Wound Width (cm) 1 cm   Wound Depth (cm) 0.5 cm   Wound Surface Area (cm^2) 0.8 cm^2   Change in Wound Size % (l*w) 66.67   Wound Volume (cm^3) 0.4 cm^3   Wound Healing % -67   Drainage Amount Scant   Drainage Description Serosanguinous   Odor None   Margins Epibole (rolled edges)   Pink%Wound Bed 50   Red%Wound Bed 50   Wound 03/06/19 Ankle Right;Lateral WCC3# Right Lateral Malleoulus   Date First Assessed/Time First Assessed: 03/06/19 1340   Wound Approximate Age at First Assessment (Weeks): 2 weeks  Primary Wound Type: Pressure Injury  Location: Ankle  Wound Location Orientation: Right;Lateral  Wound Description (Comments): WCC3# R. .. Wound Image    Dressing Status Changed   Dressing Changed Changed/New   Dressing/Treatment Wound gel;Packing  (Iodoform)   Wound Cleansed Rinsed/Irrigated with saline   Dressing Change Due 05/25/19   Wound Length (cm) 1.2 cm   Wound Width (cm) 1.2 cm   Wound Depth (cm) 0.3 cm   Wound Surface Area (cm^2) 1.44 cm^2   Change in Wound Size % (l*w) -30.91   Wound Volume (cm^3) 0.43 cm^3   Wound Healing % -30   Drainage Amount Small   Drainage Description Serosanguinous   Odor None   Exposed structure Bone  (hardware)   Melyssa-wound Assessment Dry; Intact   [place]%Wound Bed 100   Wound 05/23/19 Sacrum   Date First Assessed: 05/23/19

## 2019-05-24 NOTE — DISCHARGE SUMMARY
[facility]     Department of Internal Medicine - Critical Care Service    INPATIENT DISCHARGE SUMMARY      PATIENT IDENTIFICATION:  NAME:  Nicole Chavarria   :   1940  MRN:    [id number]     Acct:    [de-identified]   Admit Date:  2019  Discharge date:  No discharge date for patient encounter. Attending Provider: Maria E Puckett MD                                     Active Problems:    Shock ([address]    Severe anemia    Moderate malnutrition ([address]  Resolved Problems:    * No resolved hospital problems. *       REASON FOR HOSPITALIZATION:   Chief Complaint   Patient presents with   [address] Course  Patient was admitted to the ICU for concerns of weakness and concerns for worsening sacral ulcer. She was found to be in septic shock and severely anemic. She was transfused multiple units of PRBCs. She began having respiratory distress during her ICU stay. Patient was a DNRCC-A without intubation. She was hypotensive requiring pressors and placed on Bipap for her resp. Distress.  Her Respiratory status and hypotension worsened despite treatment and patient eventually  on  @ 1549    Consults:   general surgery    Procedures:  none    Any Hospital Acquired Infections: none    PATIENT'S DISCHARGE CONDITION:      PATIENT/FAMILY INSTRUCTIONS:   Current Discharge Medication List      CONTINUE these medications which have NOT CHANGED    Details   !! sodium hypochlorite (DAKINS) 0.125 % SOLN external solution Apply topically daily Apply 2 times a day  Qty: 1 Bottle, Refills: 2    Associated Diagnoses: Pressure ulcer of right ankle, stage 2; Stage II pressure ulcer of left heel; Pressure injury of contiguous region involving back and right buttock, stage 2      predniSONE (DELTASONE) 20 MG tablet take 1 tablet by mouth once daily  Qty: 30 tablet, Refills: 2    Associated Diagnoses: Myasthenia (HCC)      pyridostigmine (MESTINON) 60 MG tablet 90 mg in the morning, 60 mg afternoon, 90 mg at dinnertime and 60 mg at bedtime  Qty: 150 tablet, Refills: 5    Associated Diagnoses: Myasthenia (HCC)      simvastatin (ZOCOR) 10 MG tablet take 1 tablet by mouth every evening  Qty: 90 tablet, Refills: 3    Associated Diagnoses: Medication refill      aspirin (RA ASPIRIN EC) 81 MG EC tablet take 1 tablet by mouth once daily  Qty: 90 tablet, Refills: 5    Associated Diagnoses: Medication refill      RA ACETAMINOPHEN EX  MG tablet take 2 tablets by mouth every 6 hours if needed for pain  Qty: 120 tablet, Refills: 1    Associated Diagnoses: Medication refill      !! sodium hypochlorite (DAKINS) 0.125 % SOLN external solution do moist dressing changes 2 times a day to the left heel  Qty: 1 Bottle, Refills: 2    Associated Diagnoses: Pressure ulcer of left heel, stage 2      Handicap Placard MISC by Does not apply route 12 months  Qty: 1 each, Refills: 0    Associated Diagnoses: Pressure injury of buttock, stage 1, unspecified laterality      vitamin D (CHOLECALCIFEROL) 1000 UNIT TABS tablet Take 1 tablet by mouth daily  Qty: 30 tablet, Refills: 5       !! - Potential duplicate medications found. Please discuss with provider.         Disposition:       Roly Saldaña MD  Emergency Medicine Resident  Critical Care Service

## 2019-05-25 LAB
ABO/RH: NORMAL
ANTIBODY SCREEN: NEGATIVE
ARM BAND NUMBER: NORMAL
BLD PROD TYP BPU: NORMAL
CROSSMATCH RESULT: NORMAL
DISPENSE STATUS BLOOD BANK: NORMAL
EXPIRATION DATE: NORMAL
TRANSFUSION STATUS: NORMAL
UNIT DIVISION: 0
UNIT NUMBER: NORMAL

## 2019-05-29 LAB
CULTURE: NORMAL
CULTURE: NORMAL
Lab: NORMAL
Lab: NORMAL
SPECIMEN DESCRIPTION: NORMAL
SPECIMEN DESCRIPTION: NORMAL

## 2019-06-03 ENCOUNTER — TELEPHONE (OUTPATIENT)
Dept: PULMONOLOGY | Age: 79
End: 2019-06-03